# Patient Record
Sex: FEMALE | Race: WHITE | NOT HISPANIC OR LATINO | Employment: UNEMPLOYED | URBAN - METROPOLITAN AREA
[De-identification: names, ages, dates, MRNs, and addresses within clinical notes are randomized per-mention and may not be internally consistent; named-entity substitution may affect disease eponyms.]

---

## 2017-02-02 ENCOUNTER — HOSPITAL ENCOUNTER (EMERGENCY)
Facility: HOSPITAL | Age: 37
Discharge: HOME | End: 2017-02-02
Attending: EMERGENCY MEDICINE | Admitting: EMERGENCY MEDICINE

## 2017-02-02 ENCOUNTER — APPOINTMENT (OUTPATIENT)
Dept: GENERAL RADIOLOGY | Facility: HOSPITAL | Age: 37
End: 2017-02-02

## 2017-02-02 VITALS
WEIGHT: 153.19 LBS | DIASTOLIC BLOOD PRESSURE: 67 MMHG | BODY MASS INDEX: 24.62 KG/M2 | RESPIRATION RATE: 18 BRPM | HEIGHT: 66 IN | HEART RATE: 60 BPM | TEMPERATURE: 97.6 F | SYSTOLIC BLOOD PRESSURE: 110 MMHG | OXYGEN SATURATION: 98 %

## 2017-02-02 DIAGNOSIS — K85.90 ACUTE PANCREATITIS, UNSPECIFIED COMPLICATION STATUS, UNSPECIFIED PANCREATITIS TYPE: Primary | ICD-10-CM

## 2017-02-02 LAB
ALBUMIN SERPL-MCNC: 4.4 G/DL (ref 3.5–5.2)
ALBUMIN/GLOB SERPL: 1.8 G/DL
ALP SERPL-CCNC: 71 U/L (ref 40–129)
ALT SERPL W P-5'-P-CCNC: 16 U/L (ref 5–33)
AMYLASE SERPL-CCNC: 104 U/L (ref 28–100)
ANION GAP SERPL CALCULATED.3IONS-SCNC: 12.9 MMOL/L
APTT PPP: 31.4 SECONDS (ref 24.3–38.1)
AST SERPL-CCNC: 35 U/L (ref 5–32)
BACTERIA UR QL AUTO: ABNORMAL /HPF
BASOPHILS # BLD AUTO: 0.03 10*3/MM3 (ref 0–0.2)
BASOPHILS NFR BLD AUTO: 0.3 % (ref 0–2)
BILIRUB SERPL-MCNC: 0.6 MG/DL (ref 0.2–1.2)
BILIRUB UR QL STRIP: ABNORMAL
BUN BLD-MCNC: 9 MG/DL (ref 6–20)
BUN/CREAT SERPL: 10.8 (ref 7–25)
CALCIUM SPEC-SCNC: 9.4 MG/DL (ref 8.6–10.5)
CHLORIDE SERPL-SCNC: 103 MMOL/L (ref 98–107)
CLARITY UR: CLEAR
CO2 SERPL-SCNC: 25.1 MMOL/L (ref 22–29)
COLOR UR: ABNORMAL
CREAT BLD-MCNC: 0.83 MG/DL (ref 0.57–1)
DEPRECATED RDW RBC AUTO: 45.7 FL (ref 37–54)
EOSINOPHIL # BLD AUTO: 0.04 10*3/MM3 (ref 0.1–0.3)
EOSINOPHIL NFR BLD AUTO: 0.4 % (ref 0–4)
ERYTHROCYTE [DISTWIDTH] IN BLOOD BY AUTOMATED COUNT: 14.3 % (ref 11.5–14.5)
GFR SERPL CREATININE-BSD FRML MDRD: 77 ML/MIN/1.73
GLOBULIN UR ELPH-MCNC: 2.5 GM/DL
GLUCOSE BLD-MCNC: 103 MG/DL (ref 65–99)
GLUCOSE UR STRIP-MCNC: NEGATIVE MG/DL
HCG SERPL QL: NEGATIVE
HCT VFR BLD AUTO: 49 % (ref 37–47)
HGB BLD-MCNC: 16.4 G/DL (ref 12–16)
HGB UR QL STRIP.AUTO: NEGATIVE
HYALINE CASTS UR QL AUTO: ABNORMAL /LPF
IMM GRANULOCYTES # BLD: 0.04 10*3/MM3 (ref 0–0.03)
IMM GRANULOCYTES NFR BLD: 0.4 % (ref 0–0.5)
INR PPP: 0.95 (ref 0.9–1.1)
KETONES UR QL STRIP: ABNORMAL
LEUKOCYTE ESTERASE UR QL STRIP.AUTO: NEGATIVE
LIPASE SERPL-CCNC: 220 U/L (ref 13–60)
LYMPHOCYTES # BLD AUTO: 1.17 10*3/MM3 (ref 0.6–4.8)
LYMPHOCYTES NFR BLD AUTO: 11 % (ref 20–45)
MCH RBC QN AUTO: 29.5 PG (ref 27–31)
MCHC RBC AUTO-ENTMCNC: 33.5 G/DL (ref 31–37)
MCV RBC AUTO: 88.1 FL (ref 81–99)
MONOCYTES # BLD AUTO: 0.3 10*3/MM3 (ref 0–1)
MONOCYTES NFR BLD AUTO: 2.8 % (ref 3–8)
NEUTROPHILS # BLD AUTO: 9.09 10*3/MM3 (ref 1.5–8.3)
NEUTROPHILS NFR BLD AUTO: 85.1 % (ref 45–70)
NITRITE UR QL STRIP: NEGATIVE
NRBC BLD MANUAL-RTO: 0 /100 WBC (ref 0–0)
PH UR STRIP.AUTO: 6 [PH] (ref 4.5–8)
PLATELET # BLD AUTO: 246 10*3/MM3 (ref 140–500)
PMV BLD AUTO: 10.5 FL (ref 7.4–10.4)
POTASSIUM BLD-SCNC: 3.8 MMOL/L (ref 3.5–5.2)
PROT SERPL-MCNC: 6.9 G/DL (ref 6–8.5)
PROT UR QL STRIP: ABNORMAL
PROTHROMBIN TIME: 12.7 SECONDS (ref 12.1–15)
RBC # BLD AUTO: 5.56 10*6/MM3 (ref 4.2–5.4)
RBC # UR: ABNORMAL /HPF
REF LAB TEST METHOD: ABNORMAL
SODIUM BLD-SCNC: 141 MMOL/L (ref 136–145)
SP GR UR STRIP: 1.02 (ref 1–1.03)
SQUAMOUS #/AREA URNS HPF: ABNORMAL /HPF
TROPONIN T SERPL-MCNC: <0.01 NG/ML (ref 0–0.03)
UROBILINOGEN UR QL STRIP: ABNORMAL
WBC NRBC COR # BLD: 10.67 10*3/MM3 (ref 4.8–10.8)
WBC UR QL AUTO: ABNORMAL /HPF

## 2017-02-02 PROCEDURE — 80053 COMPREHEN METABOLIC PANEL: CPT | Performed by: EMERGENCY MEDICINE

## 2017-02-02 PROCEDURE — 85730 THROMBOPLASTIN TIME PARTIAL: CPT | Performed by: EMERGENCY MEDICINE

## 2017-02-02 PROCEDURE — 81001 URINALYSIS AUTO W/SCOPE: CPT | Performed by: EMERGENCY MEDICINE

## 2017-02-02 PROCEDURE — 93005 ELECTROCARDIOGRAM TRACING: CPT | Performed by: EMERGENCY MEDICINE

## 2017-02-02 PROCEDURE — 99284 EMERGENCY DEPT VISIT MOD MDM: CPT | Performed by: EMERGENCY MEDICINE

## 2017-02-02 PROCEDURE — 84484 ASSAY OF TROPONIN QUANT: CPT | Performed by: EMERGENCY MEDICINE

## 2017-02-02 PROCEDURE — 85610 PROTHROMBIN TIME: CPT | Performed by: EMERGENCY MEDICINE

## 2017-02-02 PROCEDURE — 84703 CHORIONIC GONADOTROPIN ASSAY: CPT | Performed by: EMERGENCY MEDICINE

## 2017-02-02 PROCEDURE — 82150 ASSAY OF AMYLASE: CPT | Performed by: EMERGENCY MEDICINE

## 2017-02-02 PROCEDURE — 83690 ASSAY OF LIPASE: CPT | Performed by: EMERGENCY MEDICINE

## 2017-02-02 PROCEDURE — 74022 RADEX COMPL AQT ABD SERIES: CPT

## 2017-02-02 PROCEDURE — 85025 COMPLETE CBC W/AUTO DIFF WBC: CPT | Performed by: EMERGENCY MEDICINE

## 2017-02-02 PROCEDURE — 96374 THER/PROPH/DIAG INJ IV PUSH: CPT

## 2017-02-02 PROCEDURE — 93010 ELECTROCARDIOGRAM REPORT: CPT | Performed by: INTERNAL MEDICINE

## 2017-02-02 PROCEDURE — 99284 EMERGENCY DEPT VISIT MOD MDM: CPT

## 2017-02-02 PROCEDURE — 99283 EMERGENCY DEPT VISIT LOW MDM: CPT

## 2017-02-02 RX ORDER — MAGNESIUM HYDROXIDE/ALUMINUM HYDROXICE/SIMETHICONE 120; 1200; 1200 MG/30ML; MG/30ML; MG/30ML
30 SUSPENSION ORAL ONCE
Status: COMPLETED | OUTPATIENT
Start: 2017-02-02 | End: 2017-02-02

## 2017-02-02 RX ORDER — PROMETHAZINE HYDROCHLORIDE 25 MG/1
25 TABLET ORAL EVERY 6 HOURS PRN
Qty: 12 TABLET | Refills: 0 | Status: SHIPPED | OUTPATIENT
Start: 2017-02-02 | End: 2019-07-31

## 2017-02-02 RX ORDER — SODIUM CHLORIDE 0.9 % (FLUSH) 0.9 %
10 SYRINGE (ML) INJECTION AS NEEDED
Status: DISCONTINUED | OUTPATIENT
Start: 2017-02-02 | End: 2017-02-02 | Stop reason: HOSPADM

## 2017-02-02 RX ORDER — IBUPROFEN 800 MG/1
800 TABLET ORAL EVERY 6 HOURS PRN
COMMUNITY
End: 2019-07-31

## 2017-02-02 RX ORDER — HYDROCODONE BITARTRATE AND ACETAMINOPHEN 5; 325 MG/1; MG/1
1 TABLET ORAL EVERY 4 HOURS PRN
Qty: 15 TABLET | Refills: 0 | Status: SHIPPED | OUTPATIENT
Start: 2017-02-02 | End: 2018-10-25 | Stop reason: HOSPADM

## 2017-02-02 RX ORDER — GLYCOPYRROLATE 0.2 MG/ML
0.2 INJECTION INTRAMUSCULAR; INTRAVENOUS ONCE
Status: COMPLETED | OUTPATIENT
Start: 2017-02-02 | End: 2017-02-02

## 2017-02-02 RX ADMIN — GLYCOPYRROLATE 0.2 MG: 0.2 INJECTION INTRAMUSCULAR; INTRAVENOUS at 11:29

## 2017-02-02 RX ADMIN — ALUMINUM HYDROXIDE, MAGNESIUM HYDROXIDE, AND SIMETHICONE 30 ML: 200; 200; 20 SUSPENSION ORAL at 11:28

## 2017-02-02 RX ADMIN — LIDOCAINE HYDROCHLORIDE: 20 SOLUTION ORAL; TOPICAL at 11:28

## 2017-02-02 NOTE — ED PROVIDER NOTES
"Subjective   History of Present Illness  History of Present Illness    Chief complaint: Epigastric and substernal chest pain    Location: Epigastrium and lower substernal area    Quality/Severity:  Severe and described as burning at first and now \"just pain\"    Timing/Duration: Symptoms began at approximately 0830 hrs. sudden onset    Modifying Factors: History of abdominal adhesions    Associated Symptoms: Mild diaphoresis    Narrative: The patient is a 36-year-old white female who presents as noted above.  The patient relates that she felt okay when she got up this morning and while sitting and drinking a Dr. Pepper she had sudden onset of symptoms.  Patient did arrive via EMS    Review of Systems   Constitutional: Negative for activity change, appetite change, fatigue and fever.   HENT: Negative for congestion.    Respiratory: Negative for cough, shortness of breath and wheezing.    Cardiovascular: Positive for chest pain. Negative for palpitations and leg swelling.   Gastrointestinal: Positive for abdominal pain. Negative for constipation, diarrhea, nausea and vomiting.   Endocrine: Negative for polydipsia.   Genitourinary: Negative for difficulty urinating, dysuria, flank pain, frequency and urgency.   Musculoskeletal: Negative for back pain.   Skin: Negative for rash.   Neurological: Negative for dizziness, weakness and headaches.   Psychiatric/Behavioral: Negative for confusion.       Past Medical History   Diagnosis Date   • COPD (chronic obstructive pulmonary disease)        Allergies   Allergen Reactions   • Aspirin Anaphylaxis   • Bactrim [Sulfamethoxazole-Trimethoprim] Anaphylaxis       Past Surgical History   Procedure Laterality Date   •  section     • Tubal abdominal ligation     • Appendectomy     • Cholecystectomy         Family history is strongly positive for coronary artery disease in multiple first-degree family members.    Social History     Social History   • Marital status: Single     " Spouse name: N/A   • Number of children: N/A   • Years of education: N/A     Social History Main Topics   • Smoking status: Current Every Day Smoker     Packs/day: 0.50     Types: Cigarettes   • Smokeless tobacco: None   • Alcohol use No   • Drug use: No   • Sexual activity: Defer     Other Topics Concern   • None     Social History Narrative   • None           Objective   Physical Exam   Constitutional: She is oriented to person, place, and time. She appears well-developed and well-nourished.   Patient is noted to be kneeling on the bed in a fetal-like position and is reluctant to move out of this position.   HENT:   Head: Normocephalic and atraumatic.   Advanced dental disease noted.   Eyes: Conjunctivae and EOM are normal.   Neck: Normal range of motion. Neck supple. No thyromegaly present.   Cardiovascular: Normal rate, regular rhythm and normal heart sounds.    No murmur heard.  Pulmonary/Chest: Effort normal and breath sounds normal. No respiratory distress. She has no wheezes. She has no rales.   Abdominal: There is tenderness (upper abdomen). There is guarding.   Bowel sounds noted to be very active   Musculoskeletal: Normal range of motion. She exhibits no edema or tenderness.   Lymphadenopathy:     She has no cervical adenopathy.   Neurological: She is alert and oriented to person, place, and time.   Skin: Skin is warm and dry. No rash noted.   Psychiatric: She has a normal mood and affect. Her behavior is normal. Judgment and thought content normal.   Nursing note and vitals reviewed.      Procedures    Final diagnoses:   Acute pancreatitis, unspecified complication status, unspecified pancreatitis type            ED Course  ED Course   Comment By Time   EKG was reviewed at 1123 hours and showed a normal sinus rhythm with a rate of 66 bpm.  Poor R-wave progression.  ST segments and T waves were unremarkable.  No ectopy. Tenzin Stringer MD 02/02 1125   02/02/17, 2:04 PM  Final radiology report on the  abdominal series noted.  Findings discussed with patient.  Expectations, care plan, follow-up and warnings discussed with patient. Tenzin Stringer MD 02/02 1412                  MDM  Number of Diagnoses or Management Options  Acute pancreatitis, unspecified complication status, unspecified pancreatitis type:      Amount and/or Complexity of Data Reviewed  Clinical lab tests: ordered and reviewed  Tests in the radiology section of CPT®: ordered and reviewed  Independent visualization of images, tracings, or specimens: yes    Risk of Complications, Morbidity, and/or Mortality  Presenting problems: high  Diagnostic procedures: high  Management options: moderate    My differential diagnosis for abdominal pain includes but is not limited to:  Gastritis, gastroenteritis, peptic ulcer disease, GERD, esophageal perforation, acute appendicitis, mesenteric adenitis, Meckel’s diverticulum, epiploic appendagitis, diverticulitis, colon cancer, ulcerative colitis, Crohn’s disease, intussusception, small bowel obstruction, adhesions, ischemic bowel, perforated viscus, ileus, obstipation, biliary colic, cholecystitis, cholelithiasis, Aptel-Tanmay Joao, hepatitis, pancreatitis, common bile duct obstruction, cholangitis, bile leak, splenic trauma, splenic rupture, splenic infarction, splenic abscess, abdominal abscess, ascites, spontaneous bacterial peritonitis, hernia, UTI, cystitis, prostatitis, ureterolithiasis, urinary obstruction, ovarian cyst, torsion, pregnancy, ectopic pregnancy, PID, pelvic abscess, mittelschmerz, endometriosis, AAA, myocardial infarction, pneumonia, cancer, porphyria, DKA, medications, sickle cell, viral syndrome, zoster    Labs this visit  Lab Results (last 24 hours)     Procedure Component Value Units Date/Time    Comprehensive Metabolic Panel [[id number]]  (Abnormal) Collected:  02/02/17 1038    Specimen:  Blood from Arm, Right Updated:  02/02/17 1109     Glucose 103 (H) mg/dL      BUN 9 mg/dL       Creatinine 0.83 mg/dL      Sodium 141 mmol/L      Potassium 3.8 mmol/L      Chloride 103 mmol/L      CO2 25.1 mmol/L      Calcium 9.4 mg/dL      Total Protein 6.9 g/dL      Albumin 4.40 g/dL      ALT (SGPT) 16 U/L      AST (SGOT) 35 (H) U/L      Alkaline Phosphatase 71 U/L      Total Bilirubin 0.6 mg/dL      eGFR Non African Amer 77 mL/min/1.73      Globulin 2.5 gm/dL      A/G Ratio 1.8 g/dL      BUN/Creatinine Ratio 10.8      Anion Gap 12.9 mmol/L     Lipase [[id number]]  (Abnormal) Collected:  02/02/17 1038    Specimen:  Blood from Arm, Right Updated:  02/02/17 1109     Lipase 220 (H) U/L     Amylase [[id number]]  (Abnormal) Collected:  02/02/17 1038    Specimen:  Blood from Arm, Right Updated:  02/02/17 1109     Amylase 104 (H) U/L     CBC & Differential [[id number]] Collected:  02/02/17 1039    Specimen:  Blood Updated:  02/02/17 1044    Narrative:       The following orders were created for panel order CBC & Differential.  Procedure                               Abnormality         Status                     ---------                               -----------         ------                     CBC Auto Differential[[id number]]         Abnormal            Final result                 Please view results for these tests on the individual orders.    CBC Auto Differential [[id number]]  (Abnormal) Collected:  02/02/17 1039    Specimen:  Blood from Arm, Right Updated:  02/02/17 1044     WBC 10.67 10*3/mm3      RBC 5.56 (H) 10*6/mm3      Hemoglobin 16.4 (H) g/dL      Hematocrit 49.0 (H) %      MCV 88.1 fL      MCH 29.5 pg      MCHC 33.5 g/dL      RDW 14.3 %      RDW-SD 45.7 fl      MPV 10.5 (H) fL      Platelets 246 10*3/mm3      Neutrophil % 85.1 (H) %      Lymphocyte % 11.0 (L) %      Monocyte % 2.8 (L) %      Eosinophil % 0.4 %      Basophil % 0.3 %      Immature Grans % 0.4 %      Neutrophils, Absolute 9.09 (H) 10*3/mm3      Lymphocytes, Absolute 1.17 10*3/mm3      Monocytes, Absolute 0.30 10*3/mm3      Eosinophils,  Absolute 0.04 (L) 10*3/mm3      Basophils, Absolute 0.03 10*3/mm3      Immature Grans, Absolute 0.04 (H) 10*3/mm3      nRBC 0.0 /100 WBC     Protime-INR [[id number]]  (Normal) Collected:  02/02/17 1116    Specimen:  Blood Updated:  02/02/17 1124     Protime 12.7 Seconds      INR 0.95     Narrative:       Therapeutic Ranges for INR: 2.0-3.0 (PT 20-30)                              2.5-3.5 (PT 25-34)    aPTT [[id number]]  (Normal) Collected:  02/02/17 1116    Specimen:  Blood Updated:  02/02/17 1124     PTT 31.4 seconds     Narrative:       PTT = The equivalent PTT values for the therapeutic range of heparin levels at 0.1 to 0.7 U/ml are 53 to 110 seconds.    hCG, Serum, Qualitative [[id number]]  (Normal) Collected:  02/02/17 1116    Specimen:  Blood Updated:  02/02/17 1135     HCG Qualitative Negative     Troponin [[id number]]  (Normal) Collected:  02/02/17 1116    Specimen:  Blood Updated:  02/02/17 1134     Troponin T <0.010 ng/mL     Narrative:       Troponin T Reference Ranges:  Less than 0.03 ng/mL:    Negative for AMI  0.03 to 0.09 ng/mL:      Indeterminant for AMI  Greater than 0.09 ng/mL: Positive for AMI    Urinalysis With / Culture If Indicated [[id number]]  (Abnormal) Collected:  02/02/17 1142    Specimen:  Urine from Urine, Clean Catch Updated:  02/02/17 1159     Color, UA Dark Yellow (A)      Appearance, UA Clear      pH, UA 6.0      Specific Gravity, UA 1.023       Result obtained by Refractometer        Glucose, UA Negative      Ketones, UA Trace (A)      Bilirubin, UA Small (1+) (A)      Blood, UA Negative      Protein, UA 30 mg/dL (1+) (A)      Leuk Esterase, UA Negative      Nitrite, UA Negative      Urobilinogen, UA 1.0 E.U./dL     Urinalysis, Microscopic Only [[id number]]  (Abnormal) Collected:  02/02/17 1142    Specimen:  Urine from Urine, Clean Catch Updated:  02/02/17 1200     RBC, UA 0-2 (A) /HPF      WBC, UA 0-2 (A) /HPF      Bacteria, UA Trace (A) /HPF      Squamous Epithelial Cells, UA 3-6 (A) /HPF       Hyaline Casts, UA None Seen /LPF      Methodology Manual Light Microscopy         Prescribed on discharge             Medication List      New Prescriptions          promethazine 25 MG tablet   Commonly known as:  PHENERGAN   Take 1 tablet by mouth Every 6 (Six) Hours As Needed for nausea or   vomiting for up to 12 doses.         Stop          amoxicillin 500 MG capsule   Commonly known as:  AMOXIL       latanoprost 0.005 % ophthalmic solution   Commonly known as:  XALATAN           All lab results, imaging results and other tests were reviewed by Tenzin Stringer MD and unless otherwise specified were found to be unremarkable.      Final diagnoses:   Acute pancreatitis, unspecified complication status, unspecified pancreatitis type            Tenzin Stringer MD  02/02/17 5609

## 2017-02-02 NOTE — DISCHARGE INSTRUCTIONS
Clear liquids only for the next 24 hours.  Use pain medicine as needed.  Return to the emergency department should the pain get worse, persistent vomiting or fever.

## 2018-09-19 ENCOUNTER — TRANSCRIBE ORDERS (OUTPATIENT)
Dept: ADMINISTRATIVE | Facility: HOSPITAL | Age: 38
End: 2018-09-19

## 2018-09-19 ENCOUNTER — HOSPITAL ENCOUNTER (OUTPATIENT)
Dept: ULTRASOUND IMAGING | Facility: HOSPITAL | Age: 38
Discharge: HOME | End: 2018-09-19
Admitting: NURSE PRACTITIONER

## 2018-09-19 DIAGNOSIS — R10.32 LEFT LOWER QUADRANT PAIN: Primary | ICD-10-CM

## 2018-09-19 DIAGNOSIS — R10.32 LEFT LOWER QUADRANT PAIN: ICD-10-CM

## 2018-09-19 PROCEDURE — 76856 US EXAM PELVIC COMPLETE: CPT

## 2018-09-19 PROCEDURE — 76830 TRANSVAGINAL US NON-OB: CPT

## 2018-10-10 ENCOUNTER — OFFICE VISIT (OUTPATIENT)
Dept: OBSTETRICS AND GYNECOLOGY | Facility: CLINIC | Age: 38
End: 2018-10-10

## 2018-10-10 VITALS
WEIGHT: 154 LBS | SYSTOLIC BLOOD PRESSURE: 140 MMHG | HEIGHT: 66 IN | DIASTOLIC BLOOD PRESSURE: 70 MMHG | BODY MASS INDEX: 24.75 KG/M2

## 2018-10-10 DIAGNOSIS — Z90.710 H/O ABDOMINAL HYSTERECTOMY: ICD-10-CM

## 2018-10-10 DIAGNOSIS — F17.200 SMOKER: ICD-10-CM

## 2018-10-10 DIAGNOSIS — R10.32 LLQ PAIN: Primary | ICD-10-CM

## 2018-10-10 DIAGNOSIS — N83.201 BILATERAL OVARIAN CYSTS: ICD-10-CM

## 2018-10-10 DIAGNOSIS — N83.202 BILATERAL OVARIAN CYSTS: ICD-10-CM

## 2018-10-10 LAB
BILIRUB BLD-MCNC: NEGATIVE MG/DL
CLARITY, POC: CLEAR
COLOR UR: YELLOW
GLUCOSE UR STRIP-MCNC: NEGATIVE MG/DL
KETONES UR QL: NEGATIVE
LEUKOCYTE EST, POC: NEGATIVE
NITRITE UR-MCNC: NEGATIVE MG/ML
PH UR: 5 [PH] (ref 5–8)
PROT UR STRIP-MCNC: NEGATIVE MG/DL
RBC # UR STRIP: NEGATIVE /UL
SP GR UR: 1 (ref 1–1.03)
UROBILINOGEN UR QL: NORMAL

## 2018-10-10 PROCEDURE — 99204 OFFICE O/P NEW MOD 45 MIN: CPT | Performed by: OBSTETRICS & GYNECOLOGY

## 2018-10-10 PROCEDURE — 99407 BEHAV CHNG SMOKING > 10 MIN: CPT | Performed by: OBSTETRICS & GYNECOLOGY

## 2018-10-10 RX ORDER — OXYCODONE HYDROCHLORIDE AND ACETAMINOPHEN 5; 325 MG/1; MG/1
1 TABLET ORAL EVERY 4 HOURS PRN
Qty: 30 TABLET | Refills: 0 | Status: SHIPPED | OUTPATIENT
Start: 2018-10-10 | End: 2018-10-20

## 2018-10-10 NOTE — PROGRESS NOTES
Patient Care Team:  Alejandra Grubbs APRN as PCP - General  Alejandra Grubbs APRN as PCP - Family Medicine    Patient new to practice? yes Patient new to examiner? yes     -----------------------------------------------------HISTORY---------------------------------------------------    Chief Complaint:   Chief Complaint   Patient presents with   • Abdominal Pain     ultrasound      New problem to examiner? yes    No LMP recorded (lmp unknown). Patient has had a hysterectomy.      HPI:     Abdominal Pain   This is a new problem. The current episode started 1 to 4 weeks ago. The onset quality is sudden. The problem occurs intermittently. The problem has been gradually worsening. The pain is located in the LLQ. The pain is at a severity of 9/10. The pain is severe. The quality of the pain is sharp. The abdominal pain does not radiate. Associated symptoms include anorexia and nausea. The pain is aggravated by certain positions. The pain is relieved by nothing. Treatments tried: motrin. The treatment provided no relief. Prior diagnostic workup includes CT scan and ultrasound.         Review of Systems   Constitutional: Negative.    HENT: Negative.    Eyes: Negative.    Respiratory: Negative.    Cardiovascular: Negative.    Gastrointestinal: Positive for abdominal pain, anorexia and nausea.   Endocrine: Negative.    Genitourinary: Positive for dyspareunia.   Musculoskeletal: Negative.    Skin: Negative.    Allergic/Immunologic: Negative.    Neurological: Negative.    Hematological: Negative.    Psychiatric/Behavioral: Negative.    :      PFSH: PAST HISTORY REVIEWED     1.    Past Medical History:   Diagnosis Date   • COPD (chronic obstructive pulmonary disease) (CMS/[facility])            Status of: reviewed.      2. No family history on file.    3. Social History: :  Single  Employment/occupation:  yes  Smoker: yes  Alcohol: no Recreational drugs: no     4.   Past Surgical History:   Procedure Laterality Date   •  "APPENDECTOMY     •  SECTION     • CHOLECYSTECTOMY     • TUBAL ABDOMINAL LIGATION          History of classical Csection?  no    5.   Current Outpatient Prescriptions:   •  albuterol (PROVENTIL HFA;VENTOLIN HFA) 108 (90 BASE) MCG/ACT inhaler, Inhale 2 puffs every 4 (four) hours as needed for wheezing., Disp: , Rfl:   •  albuterol (PROVENTIL) (2.5 MG/3ML) 0.083% nebulizer solution, Take 2.5 mg by nebulization every 4 (four) hours as needed for wheezing., Disp: , Rfl:   •  CETIRIZINE HCL PO, Take 10 mg by mouth., Disp: , Rfl:   •  HYDROcodone-acetaminophen (NORCO) 5-325 MG per tablet, Take 1 tablet by mouth Every 4 (Four) Hours As Needed for moderate pain (4-6) for up to 15 doses., Disp: 15 tablet, Rfl: 0  •  ibuprofen (ADVIL,MOTRIN) 800 MG tablet, Take 800 mg by mouth Every 6 (Six) Hours As Needed for mild pain (1-3)., Disp: , Rfl:   •  oxyCODONE-acetaminophen (PERCOCET) 5-325 MG per tablet, Take 1 tablet by mouth Every 4 (Four) Hours As Needed for Severe Pain  for up to 10 days., Disp: 30 tablet, Rfl: 0  •  promethazine (PHENERGAN) 25 MG tablet, Take 1 tablet by mouth Every 6 (Six) Hours As Needed for nausea or vomiting for up to 12 doses., Disp: 12 tablet, Rfl: 0  •  tiotropium (SPIRIVA) 18 MCG per inhalation capsule, Place 1 capsule into inhaler and inhale 1 (one) time daily., Disp: , Rfl:     6.   Allergies   Allergen Reactions   • Aspirin Anaphylaxis   • Bactrim [Sulfamethoxazole-Trimethoprim] Anaphylaxis                     -----------------------------------------------PHYSICAL EXAM----------------------------------------------    Vital Signs: /70   Ht 167.6 cm (65.98\")   Wt 69.9 kg (154 lb)   LMP  (LMP Unknown)   BMI 24.87 kg/m²    Flowsheet Rows      First Filed Value   Admission Height  167.6 cm (65.98\") Documented at 10/10/2018 1049   Admission Weight  69.9 kg (154 lb) Documented at 10/10/2018 1049          Physical Exam   Constitutional: She is oriented to person, place, and time. She " appears well-developed and well-nourished.   HENT:   Head: Normocephalic and atraumatic.   Eyes: EOM are normal.   Neck: Normal range of motion.   Pulmonary/Chest: Effort normal.   Abdominal: Soft. Bowel sounds are normal. She exhibits no distension and no mass. There is tenderness. There is guarding. There is no rebound.   Musculoskeletal: Normal range of motion.   Neurological: She is alert and oriented to person, place, and time.   Skin: Skin is warm and dry.   Psychiatric: She has a normal mood and affect. Her behavior is normal. Judgment and thought content normal.   Nursing note and vitals reviewed.                          -----------------------------------------------MEDICAL DECISION MAKING-----------------------------  Risk counseling done:  Yes    Ready to quit: Not Answered  Counseling given: Not Answered  .  I advised the patient of the risks in continuing to use tobacco, and I provided this patient with smoking cessation educational materials.  I also discussed how to quit smoking and the patient has expressed the willingness to quit.    During this visit, I spent > 10 minutes counseling the patient regarding smoking cessation.         Results Reviewed:     1.   Lab Results (last 24 hours)     Procedure Component Value Units Date/Time    POC Urinalysis Dipstick [[id number]] Collected:  10/10/18 1105    Specimen:  Urine Updated:  10/10/18 1106     Color Yellow     Clarity, UA Clear     Glucose, UA Negative mg/dL      Bilirubin Negative     Ketones, UA Negative     Specific Gravity  1.005     Blood, UA Negative     pH, Urine 5.0     Protein, POC Negative mg/dL      Urobilinogen, UA Normal     Leukocytes Negative     Nitrite, UA Negative        2.   Imaging Results (last 24 hours)     ** No results found for the last 24 hours. **        3.   ECG/EMG Results (most recent)     None          Old records reviewed?  Yes:  U/s reviewed with pt:    FINDINGS: Transabdominal and transvaginal imaging of the pelvis.  Uterus  is surgically absent. Urinary bladder grossly normal. There is a 1.3 cm  right ovarian cyst. There is a complex lesion on the left ovary  measuring 2.9 x 1.7 x 2.2 cm. This may reflect a hemorrhagic cyst.  Follow-up pelvic ultrasound in 6 weeks is recommended for further  evaluation. A true ovarian mass is not excluded. Both ovaries show  perfusion by Doppler. There is trace free fluid in the pelvis of  doubtful clinical significance.     IMPRESSION:  1. Complex lesion on the left ovary could reflect a hemorrhagic cyst.  Follow-up pelvic ultrasound in 6 weeks is recommended for further  evaluation.  2. Simple right ovarian cyst.  3. Hysterectomy.      Diagnoses and/or chronic conditions reviewed with pt:  Alexandra was seen today for abdominal pain.    Diagnoses and all orders for this visit:    LLQ pain  -     Case Request; Standing  -     CBC and Differential; Future  -     Case Request    H/O abdominal hysterectomy-for chronic DUB  -     POC Urinalysis Dipstick  -     Case Request; Standing  -     CBC and Differential; Future  -     Case Request    Smoker    Bilateral ovarian cysts  -     Case Request; Standing  -     CBC and Differential; Future  -     Case Request    Other orders  -     Follow Anesthesia Guidelines / Standing Orders; Future  -     Follow Anesthesia Guidelines / Standing Orders; Standing  -     Obtain informed consent; Standing  -     Place sequential compression device; Standing  -     oxyCODONE-acetaminophen (PERCOCET) 5-325 MG per tablet; Take 1 tablet by mouth Every 4 (Four) Hours As Needed for Severe Pain  for up to 10 days.        IMP:  Severe LLQ pain.  Hx hysterectomy.  Bilateral small ovarian cysts.    PLAN: erx percocets.    Dx laparoscopy.      RISKS, ALTERNATIVES, COMPLICATIONS OF THE PROCEDURE INCLUDING BUT NOT LIMITED TO:    INTRAOPERATIVE RISKS: INJURY TO INTERNAL ORGANS (BOWEL, BLADDER, URETER,BLOOD VESSELS) OR HEMORRHAGE REQUIRING FURTHER SURGERY, INFECTION, AND DEATH;    POSTOP COMPLICATIONS: BLEEDING, INFECTION, PNEUMONIA, PULMONARY EMBOLISM, AND DEATH;   WERE EXPLAINED TO THE PT WHO VERBALIZED HER UNDERSTANDING.        Labs/imaging ordered: none    RTO Return if symptoms worsen or fail to improve.    Time: More than 50% of time spent in counseling and/or coordination of care:  Total face-to-face/floor time 45 min.  Time spent in counseling 40 min. Counseling included the following topics with prognosis, differential diagnosis, risks, benefits of treatment, instructions, complicance and/or risk reduction and alternatives: surgical v. Medical management of pelvic pain.  Risks of narcotics.        Joe Hastings MD  11:15 AM  10/10/18

## 2018-10-15 ENCOUNTER — RESULTS ENCOUNTER (OUTPATIENT)
Dept: OBSTETRICS AND GYNECOLOGY | Facility: CLINIC | Age: 38
End: 2018-10-15

## 2018-10-15 DIAGNOSIS — N83.201 BILATERAL OVARIAN CYSTS: ICD-10-CM

## 2018-10-15 DIAGNOSIS — Z90.710 H/O ABDOMINAL HYSTERECTOMY: ICD-10-CM

## 2018-10-15 DIAGNOSIS — R10.32 LLQ PAIN: ICD-10-CM

## 2018-10-15 DIAGNOSIS — N83.202 BILATERAL OVARIAN CYSTS: ICD-10-CM

## 2018-10-15 RX ORDER — LORATADINE 10 MG/1
10 CAPSULE, LIQUID FILLED ORAL DAILY
COMMUNITY
End: 2018-11-07

## 2018-10-15 RX ORDER — CELECOXIB 100 MG/1
100 CAPSULE ORAL DAILY
COMMUNITY
End: 2019-07-31

## 2018-10-22 ENCOUNTER — ANESTHESIA EVENT (OUTPATIENT)
Dept: PERIOP | Facility: HOSPITAL | Age: 38
End: 2018-10-22

## 2018-10-24 PROCEDURE — S0260 H&P FOR SURGERY: HCPCS | Performed by: OBSTETRICS & GYNECOLOGY

## 2018-10-25 ENCOUNTER — HOSPITAL ENCOUNTER (OUTPATIENT)
Facility: HOSPITAL | Age: 38
Setting detail: HOSPITAL OUTPATIENT SURGERY
Discharge: HOME | End: 2018-10-25
Attending: OBSTETRICS & GYNECOLOGY | Admitting: OBSTETRICS & GYNECOLOGY

## 2018-10-25 ENCOUNTER — ANESTHESIA (OUTPATIENT)
Dept: PERIOP | Facility: HOSPITAL | Age: 38
End: 2018-10-25

## 2018-10-25 VITALS
BODY MASS INDEX: 23.92 KG/M2 | TEMPERATURE: 97.8 F | DIASTOLIC BLOOD PRESSURE: 69 MMHG | HEIGHT: 67 IN | RESPIRATION RATE: 14 BRPM | SYSTOLIC BLOOD PRESSURE: 115 MMHG | OXYGEN SATURATION: 98 % | WEIGHT: 152.4 LBS | HEART RATE: 56 BPM

## 2018-10-25 LAB
BASOPHILS # BLD AUTO: 0.07 10*3/MM3 (ref 0–0.2)
BASOPHILS NFR BLD AUTO: 1.1 % (ref 0–2)
DEPRECATED RDW RBC AUTO: 45.4 FL (ref 37–54)
EOSINOPHIL # BLD AUTO: 0.12 10*3/MM3 (ref 0.1–0.3)
EOSINOPHIL NFR BLD AUTO: 2 % (ref 0–4)
ERYTHROCYTE [DISTWIDTH] IN BLOOD BY AUTOMATED COUNT: 14 % (ref 11.5–14.5)
HCT VFR BLD AUTO: 44.6 % (ref 37–47)
HGB BLD-MCNC: 14.9 G/DL (ref 12–16)
IMM GRANULOCYTES # BLD: 0.01 10*3/MM3 (ref 0–0.03)
IMM GRANULOCYTES NFR BLD: 0.2 % (ref 0–0.5)
LYMPHOCYTES # BLD AUTO: 1.88 10*3/MM3 (ref 0.6–4.8)
LYMPHOCYTES NFR BLD AUTO: 30.7 % (ref 20–45)
MCH RBC QN AUTO: 29.1 PG (ref 27–31)
MCHC RBC AUTO-ENTMCNC: 33.4 G/DL (ref 31–37)
MCV RBC AUTO: 87.1 FL (ref 81–99)
MONOCYTES # BLD AUTO: 0.63 10*3/MM3 (ref 0–1)
MONOCYTES NFR BLD AUTO: 10.3 % (ref 3–8)
NEUTROPHILS # BLD AUTO: 3.42 10*3/MM3 (ref 1.5–8.3)
NEUTROPHILS NFR BLD AUTO: 55.7 % (ref 45–70)
NRBC BLD MANUAL-RTO: 0 /100 WBC (ref 0–0)
PLATELET # BLD AUTO: 232 10*3/MM3 (ref 140–500)
PMV BLD AUTO: 10.2 FL (ref 7.4–10.4)
RBC # BLD AUTO: 5.12 10*6/MM3 (ref 4.2–5.4)
WBC NRBC COR # BLD: 6.13 10*3/MM3 (ref 4.8–10.8)

## 2018-10-25 PROCEDURE — 25010000002 METOCLOPRAMIDE PER 10 MG: Performed by: NURSE ANESTHETIST, CERTIFIED REGISTERED

## 2018-10-25 PROCEDURE — 36415 COLL VENOUS BLD VENIPUNCTURE: CPT | Performed by: OBSTETRICS & GYNECOLOGY

## 2018-10-25 PROCEDURE — 49329 UNLSTD LAPS PX ABD PERTM&OMN: CPT | Performed by: OBSTETRICS & GYNECOLOGY

## 2018-10-25 PROCEDURE — 25010000002 PROPOFOL 10 MG/ML EMULSION: Performed by: NURSE ANESTHETIST, CERTIFIED REGISTERED

## 2018-10-25 PROCEDURE — 85025 COMPLETE CBC W/AUTO DIFF WBC: CPT | Performed by: OBSTETRICS & GYNECOLOGY

## 2018-10-25 PROCEDURE — 25010000002 SUCCINYLCHOLINE PER 20 MG: Performed by: NURSE ANESTHETIST, CERTIFIED REGISTERED

## 2018-10-25 PROCEDURE — 25010000002 ONDANSETRON PER 1 MG: Performed by: NURSE ANESTHETIST, CERTIFIED REGISTERED

## 2018-10-25 PROCEDURE — 25010000002 FENTANYL CITRATE (PF) 100 MCG/2ML SOLUTION: Performed by: NURSE ANESTHETIST, CERTIFIED REGISTERED

## 2018-10-25 PROCEDURE — 25010000002 NEOSTIGMINE PER 0.5 MG: Performed by: NURSE ANESTHETIST, CERTIFIED REGISTERED

## 2018-10-25 PROCEDURE — 44180 LAP ENTEROLYSIS: CPT | Performed by: OBSTETRICS & GYNECOLOGY

## 2018-10-25 PROCEDURE — 25010000002 MIDAZOLAM PER 1 MG: Performed by: NURSE ANESTHETIST, CERTIFIED REGISTERED

## 2018-10-25 PROCEDURE — 25010000002 DEXAMETHASONE PER 1 MG: Performed by: NURSE ANESTHETIST, CERTIFIED REGISTERED

## 2018-10-25 RX ORDER — ROCURONIUM BROMIDE 10 MG/ML
INJECTION, SOLUTION INTRAVENOUS AS NEEDED
Status: DISCONTINUED | OUTPATIENT
Start: 2018-10-25 | End: 2018-10-25 | Stop reason: SURG

## 2018-10-25 RX ORDER — SODIUM CHLORIDE 0.9 % (FLUSH) 0.9 %
3 SYRINGE (ML) INJECTION EVERY 12 HOURS SCHEDULED
Status: DISCONTINUED | OUTPATIENT
Start: 2018-10-25 | End: 2018-10-25 | Stop reason: HOSPADM

## 2018-10-25 RX ORDER — SODIUM CHLORIDE, SODIUM LACTATE, POTASSIUM CHLORIDE, CALCIUM CHLORIDE 600; 310; 30; 20 MG/100ML; MG/100ML; MG/100ML; MG/100ML
100 INJECTION, SOLUTION INTRAVENOUS CONTINUOUS
Status: DISCONTINUED | OUTPATIENT
Start: 2018-10-25 | End: 2018-10-25 | Stop reason: HOSPADM

## 2018-10-25 RX ORDER — MIDAZOLAM HYDROCHLORIDE 1 MG/ML
1 INJECTION INTRAMUSCULAR; INTRAVENOUS
Status: DISCONTINUED | OUTPATIENT
Start: 2018-10-25 | End: 2018-10-25 | Stop reason: HOSPADM

## 2018-10-25 RX ORDER — FAMOTIDINE 20 MG/1
20 TABLET, FILM COATED ORAL EVERY 12 HOURS SCHEDULED
Status: DISCONTINUED | OUTPATIENT
Start: 2018-10-25 | End: 2018-10-25 | Stop reason: HOSPADM

## 2018-10-25 RX ORDER — DEXAMETHASONE SODIUM PHOSPHATE 4 MG/ML
8 INJECTION, SOLUTION INTRA-ARTICULAR; INTRALESIONAL; INTRAMUSCULAR; INTRAVENOUS; SOFT TISSUE ONCE AS NEEDED
Status: COMPLETED | OUTPATIENT
Start: 2018-10-25 | End: 2018-10-25

## 2018-10-25 RX ORDER — OXYCODONE HYDROCHLORIDE AND ACETAMINOPHEN 5; 325 MG/1; MG/1
2 TABLET ORAL EVERY 4 HOURS PRN
Qty: 30 TABLET | Refills: 0 | Status: SHIPPED | OUTPATIENT
Start: 2018-10-25 | End: 2018-11-04

## 2018-10-25 RX ORDER — SODIUM CHLORIDE, SODIUM LACTATE, POTASSIUM CHLORIDE, CALCIUM CHLORIDE 600; 310; 30; 20 MG/100ML; MG/100ML; MG/100ML; MG/100ML
9 INJECTION, SOLUTION INTRAVENOUS CONTINUOUS
Status: DISCONTINUED | OUTPATIENT
Start: 2018-10-25 | End: 2018-10-25 | Stop reason: HOSPADM

## 2018-10-25 RX ORDER — SODIUM CHLORIDE 0.9 % (FLUSH) 0.9 %
1-10 SYRINGE (ML) INJECTION AS NEEDED
Status: DISCONTINUED | OUTPATIENT
Start: 2018-10-25 | End: 2018-10-25 | Stop reason: HOSPADM

## 2018-10-25 RX ORDER — LIDOCAINE HYDROCHLORIDE 10 MG/ML
0.5 INJECTION, SOLUTION EPIDURAL; INFILTRATION; INTRACAUDAL; PERINEURAL ONCE AS NEEDED
Status: DISCONTINUED | OUTPATIENT
Start: 2018-10-25 | End: 2018-10-25 | Stop reason: HOSPADM

## 2018-10-25 RX ORDER — GLYCOPYRROLATE 0.2 MG/ML
INJECTION INTRAMUSCULAR; INTRAVENOUS AS NEEDED
Status: DISCONTINUED | OUTPATIENT
Start: 2018-10-25 | End: 2018-10-25 | Stop reason: SURG

## 2018-10-25 RX ORDER — SUCCINYLCHOLINE CHLORIDE 20 MG/ML
INJECTION INTRAMUSCULAR; INTRAVENOUS AS NEEDED
Status: DISCONTINUED | OUTPATIENT
Start: 2018-10-25 | End: 2018-10-25 | Stop reason: SURG

## 2018-10-25 RX ORDER — OXYCODONE HYDROCHLORIDE AND ACETAMINOPHEN 5; 325 MG/1; MG/1
1 TABLET ORAL ONCE AS NEEDED
Status: COMPLETED | OUTPATIENT
Start: 2018-10-25 | End: 2018-10-25

## 2018-10-25 RX ORDER — LIDOCAINE HYDROCHLORIDE 20 MG/ML
INJECTION, SOLUTION INFILTRATION; PERINEURAL AS NEEDED
Status: DISCONTINUED | OUTPATIENT
Start: 2018-10-25 | End: 2018-10-25 | Stop reason: SURG

## 2018-10-25 RX ORDER — IBUPROFEN 800 MG/1
800 TABLET ORAL EVERY 8 HOURS PRN
Qty: 30 TABLET | Refills: 0 | Status: SHIPPED | OUTPATIENT
Start: 2018-10-25 | End: 2019-07-31 | Stop reason: SDUPTHER

## 2018-10-25 RX ORDER — MAGNESIUM HYDROXIDE 1200 MG/15ML
LIQUID ORAL AS NEEDED
Status: DISCONTINUED | OUTPATIENT
Start: 2018-10-25 | End: 2018-10-25 | Stop reason: HOSPADM

## 2018-10-25 RX ORDER — GLYCOPYRROLATE 0.2 MG/ML
0.2 INJECTION INTRAMUSCULAR; INTRAVENOUS
Status: DISCONTINUED | OUTPATIENT
Start: 2018-10-25 | End: 2018-10-25 | Stop reason: HOSPADM

## 2018-10-25 RX ORDER — SODIUM CHLORIDE 9 MG/ML
40 INJECTION, SOLUTION INTRAVENOUS AS NEEDED
Status: DISCONTINUED | OUTPATIENT
Start: 2018-10-25 | End: 2018-10-25 | Stop reason: HOSPADM

## 2018-10-25 RX ORDER — ONDANSETRON 2 MG/ML
4 INJECTION INTRAMUSCULAR; INTRAVENOUS ONCE AS NEEDED
Status: DISCONTINUED | OUTPATIENT
Start: 2018-10-25 | End: 2018-10-25 | Stop reason: HOSPADM

## 2018-10-25 RX ORDER — MIDAZOLAM HYDROCHLORIDE 1 MG/ML
2 INJECTION INTRAMUSCULAR; INTRAVENOUS
Status: DISCONTINUED | OUTPATIENT
Start: 2018-10-25 | End: 2018-10-25 | Stop reason: HOSPADM

## 2018-10-25 RX ORDER — ONDANSETRON 2 MG/ML
4 INJECTION INTRAMUSCULAR; INTRAVENOUS ONCE AS NEEDED
Status: COMPLETED | OUTPATIENT
Start: 2018-10-25 | End: 2018-10-25

## 2018-10-25 RX ORDER — PROPOFOL 10 MG/ML
VIAL (ML) INTRAVENOUS AS NEEDED
Status: DISCONTINUED | OUTPATIENT
Start: 2018-10-25 | End: 2018-10-25 | Stop reason: SURG

## 2018-10-25 RX ORDER — METOCLOPRAMIDE HYDROCHLORIDE 5 MG/ML
10 INJECTION INTRAMUSCULAR; INTRAVENOUS ONCE AS NEEDED
Status: COMPLETED | OUTPATIENT
Start: 2018-10-25 | End: 2018-10-25

## 2018-10-25 RX ORDER — FENTANYL CITRATE 50 UG/ML
INJECTION, SOLUTION INTRAMUSCULAR; INTRAVENOUS AS NEEDED
Status: DISCONTINUED | OUTPATIENT
Start: 2018-10-25 | End: 2018-10-25 | Stop reason: SURG

## 2018-10-25 RX ADMIN — ROCURONIUM BROMIDE 10 MG: 10 INJECTION INTRAVENOUS at 09:47

## 2018-10-25 RX ADMIN — GLYCOPYRROLATE 0.2 MG: 0.2 INJECTION INTRAMUSCULAR; INTRAVENOUS at 08:39

## 2018-10-25 RX ADMIN — NEOSTIGMINE METHYLSULFATE 4 MG: 1 INJECTION, SOLUTION INTRAMUSCULAR; INTRAVENOUS; SUBCUTANEOUS at 10:22

## 2018-10-25 RX ADMIN — DEXAMETHASONE SODIUM PHOSPHATE 8 MG: 4 INJECTION, SOLUTION INTRAMUSCULAR; INTRAVENOUS at 08:38

## 2018-10-25 RX ADMIN — FENTANYL CITRATE 100 MCG: 50 INJECTION, SOLUTION INTRAMUSCULAR; INTRAVENOUS at 09:32

## 2018-10-25 RX ADMIN — LIDOCAINE HYDROCHLORIDE 100 MG: 20 INJECTION, SOLUTION INFILTRATION; PERINEURAL at 09:32

## 2018-10-25 RX ADMIN — LIDOCAINE HYDROCHLORIDE 100 MG: 20 INJECTION, SOLUTION INFILTRATION; PERINEURAL at 10:29

## 2018-10-25 RX ADMIN — MIDAZOLAM HYDROCHLORIDE 1 MG: 1 INJECTION, SOLUTION INTRAMUSCULAR; INTRAVENOUS at 09:18

## 2018-10-25 RX ADMIN — PROPOFOL 200 MG: 10 INJECTION, EMULSION INTRAVENOUS at 09:36

## 2018-10-25 RX ADMIN — ROCURONIUM BROMIDE 5 MG: 10 INJECTION INTRAVENOUS at 10:00

## 2018-10-25 RX ADMIN — ROCURONIUM BROMIDE 5 MG: 10 INJECTION INTRAVENOUS at 09:32

## 2018-10-25 RX ADMIN — OXYCODONE HYDROCHLORIDE AND ACETAMINOPHEN 1 TABLET: 5; 325 TABLET ORAL at 11:22

## 2018-10-25 RX ADMIN — METOCLOPRAMIDE 10 MG: 5 INJECTION, SOLUTION INTRAMUSCULAR; INTRAVENOUS at 08:39

## 2018-10-25 RX ADMIN — ROCURONIUM BROMIDE 5 MG: 10 INJECTION INTRAVENOUS at 10:15

## 2018-10-25 RX ADMIN — GLYCOPYRROLATE 0.6 MG: 0.2 INJECTION INTRAMUSCULAR; INTRAVENOUS at 10:22

## 2018-10-25 RX ADMIN — ONDANSETRON 4 MG: 2 INJECTION, SOLUTION INTRAMUSCULAR; INTRAVENOUS at 08:39

## 2018-10-25 RX ADMIN — SUCCINYLCHOLINE CHLORIDE 200 MG: 20 INJECTION, SOLUTION INTRAMUSCULAR; INTRAVENOUS at 09:36

## 2018-10-25 RX ADMIN — SODIUM CHLORIDE, POTASSIUM CHLORIDE, SODIUM LACTATE AND CALCIUM CHLORIDE 9 ML/HR: 600; 310; 30; 20 INJECTION, SOLUTION INTRAVENOUS at 08:39

## 2018-10-25 RX ADMIN — FAMOTIDINE 20 MG: 10 INJECTION, SOLUTION INTRAVENOUS at 08:39

## 2018-10-25 NOTE — ANESTHESIA PROCEDURE NOTES
Airway  Urgency: elective    Date/Time: 10/25/2018 9:37 AM  End Time:10/25/2018 9:37 AM  Airway not difficult    General Information and Staff    Patient location during procedure: OR  CRNA: ZACKERY MCNEIL    Indications and Patient Condition  Indications for airway management: airway protection    Preoxygenated: yes  MILS maintained throughout  Mask difficulty assessment: 0 - not attempted (RSI)    Final Airway Details  Final airway type: endotracheal airway      Successful airway: ETT  Cuffed: yes   Successful intubation technique: direct laryngoscopy  Facilitating devices/methods: intubating stylet and cricoid pressure  Endotracheal tube insertion site: oral  Blade: Chin  Blade size: 2  ETT size: 7.5 mm  Cormack-Lehane Classification: grade I - full view of glottis  Placement verified by: chest auscultation and capnometry   Measured from: lips  ETT to lips (cm): 22  Number of attempts at approach: 1    Additional Comments  BEBS, +ETCO2, VSS. TEETH AND GUMS AS PRE-OP.

## 2018-10-25 NOTE — ANESTHESIA PREPROCEDURE EVALUATION
Anesthesia Evaluation     Patient summary reviewed and Nursing notes reviewed   no history of anesthetic complications:  NPO Solid Status: > 8 hours  NPO Liquid Status: > 2 hours           Airway   Mallampati: II  TM distance: >3 FB  Neck ROM: full  No difficulty expected  Dental    (+) poor dentition        Pulmonary - normal exam    breath sounds clear to auscultation  (+) a smoker (1 ppd x 25 yrs) Current Smoked day of surgery, COPD mild, asthma,     ROS comment: Used inhalers today  Cardiovascular - negative cardio ROS and normal exam  Exercise tolerance: good (4-7 METS)    Rhythm: regular  Rate: normal        Neuro/Psych  (+) headaches,     GI/Hepatic/Renal/Endo - negative ROS     Musculoskeletal     Abdominal  - normal exam   Substance History - negative use     OB/GYN          Other   (+) arthritis                     Anesthesia Plan    ASA 2     general     intravenous induction   Anesthetic plan, all risks, benefits, and alternatives have been provided, discussed and informed consent has been obtained with: patient.  Use of blood products discussed with patient  Consented to blood products.

## 2018-10-25 NOTE — ANESTHESIA POSTPROCEDURE EVALUATION
Patient: Alexandra Cantrell    Procedure Summary     Date:  10/25/18 Room / Location:   [facility] OR 2 /  [facility]    Anesthesia Start:  0931 Anesthesia Stop:  1037    Procedure:  DIAGNOSTIC LAPAROSCOPY with lysis of adhesions (N/A Abdomen) Diagnosis:       LLQ pain      Bilateral ovarian cysts      H/O abdominal hysterectomy      (LLQ pain [R10.32])      (Bilateral ovarian cysts [N83.201, N83.202])      (H/O abdominal hysterectomy [Z90.710])    Surgeon:  Joe Hastings MD Provider:  Dianna Sharif CRNA    Anesthesia Type:  general ASA Status:  2          Anesthesia Type: general  Last vitals  BP   106/84 (10/25/18 1109)   Temp   97.8 °F (36.6 °C) (10/25/18 1109)   Pulse   61 (10/25/18 1109)   Resp   12 (10/25/18 1109)     SpO2   98 % (10/25/18 1109)     Post Anesthesia Care and Evaluation    Patient location during evaluation: PHASE II  Patient participation: complete - patient participated  Level of consciousness: awake and alert  Pain score: 0  Pain management: adequate  Airway patency: patent  Anesthetic complications: No anesthetic complications  PONV Status: none  Cardiovascular status: acceptable  Respiratory status: acceptable  Hydration status: acceptable

## 2018-11-07 ENCOUNTER — OFFICE VISIT (OUTPATIENT)
Dept: OBSTETRICS AND GYNECOLOGY | Facility: CLINIC | Age: 38
End: 2018-11-07

## 2018-11-07 VITALS
SYSTOLIC BLOOD PRESSURE: 120 MMHG | BODY MASS INDEX: 25.07 KG/M2 | DIASTOLIC BLOOD PRESSURE: 60 MMHG | WEIGHT: 156 LBS | HEIGHT: 66 IN

## 2018-11-07 DIAGNOSIS — N73.6 PELVIC ADHESIVE DISEASE: ICD-10-CM

## 2018-11-07 DIAGNOSIS — F17.200 SMOKER: Primary | ICD-10-CM

## 2018-11-07 PROCEDURE — 99024 POSTOP FOLLOW-UP VISIT: CPT | Performed by: OBSTETRICS & GYNECOLOGY

## 2018-11-07 NOTE — PROGRESS NOTES
Surgical follow up visit     PATIENT INFORMATION  Alexandra Cantrell       - 1980    CHIEF COMPLAINT  Chief Complaint   Patient presents with   • Post-op       Alexandra Cantrell is a 38 y.o. female who presents to the clinic 2 weeks status post dx laparoscopy with extensive CRISTIAN for pelvic pain. Eating a regular diet without difficulty. Bowel movements are normal. Pain is controlled with current analgesics. Medications being used   Pain Medications             celecoxib (CeleBREX) 100 MG capsule Take 100 mg by mouth Daily.    ibuprofen (ADVIL,MOTRIN) 800 MG tablet Take 800 mg by mouth Every 6 (Six) Hours As Needed for Mild Pain  (ARTHRITIS).    ibuprofen (ADVIL,MOTRIN) 800 MG tablet Take 1 tablet by mouth Every 8 (Eight) Hours As Needed for Mild Pain .      .    HISTORY OF PRESENT ILLNESS  HPI        REVIEW OF SYSTEMS  Review of Systems   Constitutional: Negative.    HENT: Negative.    Eyes: Negative.    Respiratory: Negative.    Cardiovascular: Negative.    Gastrointestinal: Positive for abdominal pain.   Endocrine: Negative.    Genitourinary: Positive for pelvic pain.   Musculoskeletal: Negative.    Skin: Negative.    Allergic/Immunologic: Negative.    Neurological: Negative.    Hematological: Negative.    Psychiatric/Behavioral: Negative.          ACTIVE PROBLEMS  Patient Active Problem List    Diagnosis   • Pelvic adhesive disease [N73.6]   • LLQ pain [R10.32]   • H/O abdominal hysterectomy-for chronic DUB [Z90.710]   • Smoker [F17.200]   • Bilateral ovarian cysts [N83.201, N83.202]         PAST MEDICAL HISTORY  Past Medical History:   Diagnosis Date   • Abdominal pain     SCHEDULED FOR SX   • Abscess     LEFT UPPER JAW, TOOTH BROKE OFF   • Arthritis     FINGERS & TOES   • Asthma    • COPD (chronic obstructive pulmonary disease) (CMS/HCC)    • Migraines          SURGICAL HISTORY  Past Surgical History:   Procedure Laterality Date   • APPENDECTOMY     •  SECTION      X3   • CHOLECYSTECTOMY      LAP  "  • DIAGNOSTIC LAPAROSCOPY N/A 10/25/2018    Procedure: DIAGNOSTIC LAPAROSCOPY with lysis of adhesions;  Surgeon: Joe Hastings MD;  Location: [facility];  Service: Obstetrics/Gynecology   • HYSTERECTOMY      OPEN   • TUBAL ABDOMINAL LIGATION           FAMILY HISTORY  History reviewed. No pertinent family history.      SOCIAL HISTORY  Social History     Occupational History   • Not on file.     Social History Main Topics   • Smoking status: Current Every Day Smoker     Packs/day: 1.00     Years: 25.00     Types: Cigarettes   • Smokeless tobacco: Never Used   • Alcohol use No   • Drug use: No   • Sexual activity: Defer         CURRENT MEDICATIONS    Current Outpatient Prescriptions:   •  albuterol (PROVENTIL HFA;VENTOLIN HFA) 108 (90 BASE) MCG/ACT inhaler, Inhale 2 puffs every 4 (four) hours as needed for wheezing., Disp: , Rfl:   •  albuterol (PROVENTIL) (2.5 MG/3ML) 0.083% nebulizer solution, Take 2.5 mg by nebulization every 4 (four) hours as needed for wheezing., Disp: , Rfl:   •  celecoxib (CeleBREX) 100 MG capsule, Take 100 mg by mouth Daily., Disp: , Rfl:   •  CETIRIZINE HCL PO, Take 10 mg by mouth., Disp: , Rfl:   •  ibuprofen (ADVIL,MOTRIN) 800 MG tablet, Take 800 mg by mouth Every 6 (Six) Hours As Needed for Mild Pain  (ARTHRITIS)., Disp: , Rfl:   •  ibuprofen (ADVIL,MOTRIN) 800 MG tablet, Take 1 tablet by mouth Every 8 (Eight) Hours As Needed for Mild Pain ., Disp: 30 tablet, Rfl: 0  •  promethazine (PHENERGAN) 25 MG tablet, Take 1 tablet by mouth Every 6 (Six) Hours As Needed for nausea or vomiting for up to 12 doses., Disp: 12 tablet, Rfl: 0  •  tiotropium (SPIRIVA) 18 MCG per inhalation capsule, Place 1 capsule into inhaler and inhale 1 (one) time daily., Disp: , Rfl:     ALLERGIES  Aspirin and Bactrim [sulfamethoxazole-trimethoprim]    VITALS  Vitals:    11/07/18 0920   BP: 120/60   Weight: 70.8 kg (156 lb)   Height: 168 cm (66.14\")       LAST RESULTS   Results Encounter on 10/15/2018 " "  Component Date Value Ref Range Status   • WBC 10/25/2018 6.13  4.80 - 10.80 10*3/mm3 Final   • RBC 10/25/2018 5.12  4.20 - 5.40 10*6/mm3 Final   • Hemoglobin 10/25/2018 14.9  12.0 - 16.0 g/dL Final   • Hematocrit 10/25/2018 44.6  37.0 - 47.0 % Final   • MCV 10/25/2018 87.1  81.0 - 99.0 fL Final   • MCH 10/25/2018 29.1  27.0 - 31.0 pg Final   • MCHC 10/25/2018 33.4  31.0 - 37.0 g/dL Final   • RDW 10/25/2018 14.0  11.5 - 14.5 % Final   • RDW-SD 10/25/2018 45.4  37.0 - 54.0 fl Final   • MPV 10/25/2018 10.2  7.4 - 10.4 fL Final   • Platelets 10/25/2018 232  140 - 500 10*3/mm3 Final   • Neutrophil % 10/25/2018 55.7  45.0 - 70.0 % Final   • Lymphocyte % 10/25/2018 30.7  20.0 - 45.0 % Final   • Monocyte % 10/25/2018 10.3* 3.0 - 8.0 % Final   • Eosinophil % 10/25/2018 2.0  0.0 - 4.0 % Final   • Basophil % 10/25/2018 1.1  0.0 - 2.0 % Final   • Immature Grans % 10/25/2018 0.2  0.0 - 0.5 % Final   • Neutrophils, Absolute 10/25/2018 3.42  1.50 - 8.30 10*3/mm3 Final   • Lymphocytes, Absolute 10/25/2018 1.88  0.60 - 4.80 10*3/mm3 Final   • Monocytes, Absolute 10/25/2018 0.63  0.00 - 1.00 10*3/mm3 Final   • Eosinophils, Absolute 10/25/2018 0.12  0.10 - 0.30 10*3/mm3 Final   • Basophils, Absolute 10/25/2018 0.07  0.00 - 0.20 10*3/mm3 Final   • Immature Grans, Absolute 10/25/2018 0.01  0.00 - 0.03 10*3/mm3 Final   • nRBC 10/25/2018 0.0  0.0 - 0.0 /100 WBC Final     No results found.    PHYSICAL EXAM  /60   Ht 168 cm (66.14\")   Wt 70.8 kg (156 lb)   LMP  (LMP Unknown)   BMI 25.07 kg/m²   Physical Exam   Constitutional: She is oriented to person, place, and time. She appears well-developed and well-nourished.   Abdominal: Soft. She exhibits no distension and no mass. There is no tenderness. There is no rebound and no guarding. No hernia.   Incision clean dry and intact   Musculoskeletal: Normal range of motion.   Neurological: She is alert and oriented to person, place, and time.   Skin: Skin is warm and dry. "   Psychiatric: She has a normal mood and affect. Her behavior is normal. Judgment and thought content normal.   Nursing note and vitals reviewed.    Ready to quit: Not Answered  Counseling given: Not Answered  .  I advised the patient of the risks in continuing to use tobacco, and I provided this patient with smoking cessation educational materials.  I also discussed how to quit smoking and the patient has expressed the willingness to quit.    During this visit, I spent > 3-10 minutes counseling the patient regarding smoking cessation.         Assessment     1) Post op Doing well postoperatively.  2) Operative findings again reviewed. Pathology report discussed.     Plan    1. Continue any current medications.  2. Wound care discussed.  3. Activity restrictions: none to continue  4. Anticipated return to work: now.    Alexandra was seen today for post-op.    Diagnoses and all orders for this visit:    Smoker    Pelvic adhesive disease        RTO Return in about 1 year (around 11/7/2019) for Annual physical.      Joe Hastings MD    11/7/2018  9:38 AM        ASSESSMENT  Diagnoses and all orders for this visit:    Smoker    Pelvic adhesive disease      Encounter Diagnoses   Name Primary?   • Smoker Yes   • Pelvic adhesive disease          PLAN  Return in about 1 year (around 11/7/2019) for Annual physical.    Joe Hastings MD  9:38 AM  @today@

## 2018-11-21 ENCOUNTER — OFFICE VISIT (OUTPATIENT)
Dept: OBSTETRICS AND GYNECOLOGY | Facility: CLINIC | Age: 38
End: 2018-11-21

## 2018-11-21 VITALS
BODY MASS INDEX: 25.39 KG/M2 | SYSTOLIC BLOOD PRESSURE: 140 MMHG | WEIGHT: 158 LBS | DIASTOLIC BLOOD PRESSURE: 70 MMHG | HEIGHT: 66 IN

## 2018-11-21 DIAGNOSIS — N94.10 DYSPAREUNIA IN FEMALE: ICD-10-CM

## 2018-11-21 DIAGNOSIS — R10.32 LLQ PAIN: ICD-10-CM

## 2018-11-21 DIAGNOSIS — Z13.9 SCREENING FOR CONDITION: Primary | ICD-10-CM

## 2018-11-21 DIAGNOSIS — N83.202 BILATERAL OVARIAN CYSTS: ICD-10-CM

## 2018-11-21 DIAGNOSIS — N83.201 BILATERAL OVARIAN CYSTS: ICD-10-CM

## 2018-11-21 DIAGNOSIS — Z90.710 H/O ABDOMINAL HYSTERECTOMY: ICD-10-CM

## 2018-11-21 DIAGNOSIS — F17.200 SMOKER: ICD-10-CM

## 2018-11-21 DIAGNOSIS — N73.6 PELVIC ADHESIVE DISEASE: ICD-10-CM

## 2018-11-21 LAB
BILIRUB BLD-MCNC: NEGATIVE MG/DL
CLARITY, POC: CLEAR
COLOR UR: YELLOW
GLUCOSE UR STRIP-MCNC: NEGATIVE MG/DL
KETONES UR QL: NEGATIVE
LEUKOCYTE EST, POC: NEGATIVE
NITRITE UR-MCNC: NEGATIVE MG/ML
PH UR: 6 [PH] (ref 5–8)
PROT UR STRIP-MCNC: NEGATIVE MG/DL
RBC # UR STRIP: NEGATIVE /UL
SP GR UR: 1.01 (ref 1–1.03)
UROBILINOGEN UR QL: NORMAL

## 2018-11-21 PROCEDURE — 99024 POSTOP FOLLOW-UP VISIT: CPT | Performed by: OBSTETRICS & GYNECOLOGY

## 2018-11-21 PROCEDURE — 81002 URINALYSIS NONAUTO W/O SCOPE: CPT | Performed by: OBSTETRICS & GYNECOLOGY

## 2018-11-21 NOTE — PROGRESS NOTES
Surgical follow up visit     PATIENT INFORMATION  Alexandra Cantrell       - 1980    CHIEF COMPLAINT  Chief Complaint   Patient presents with   • Post-op     incision redness and drainage   • Dysmenorrhea     frequency urinating       Alexandra Cantrell is a 38 y.o. female who presents to the clinic 3 weeks status post dx laparoscopy w/ LO Afor pelvic pain. Eating a regular diet without difficulty. Bowel movements are normal. Pain is controlled with current analgesics. Medications being used   Pain Medications             celecoxib (CeleBREX) 100 MG capsule Take 100 mg by mouth Daily.    ibuprofen (ADVIL,MOTRIN) 800 MG tablet Take 800 mg by mouth Every 6 (Six) Hours As Needed for Mild Pain  (ARTHRITIS).    ibuprofen (ADVIL,MOTRIN) 800 MG tablet Take 1 tablet by mouth Every 8 (Eight) Hours As Needed for Mild Pain .      .    HISTORY OF PRESENT ILLNESS  HPI        REVIEW OF SYSTEMS  Review of Systems   Constitutional: Negative.    Respiratory: Negative.    Cardiovascular: Negative.    Gastrointestinal: Positive for abdominal pain.   Genitourinary: Positive for dyspareunia.   Neurological: Negative.    Psychiatric/Behavioral: Negative.          ACTIVE PROBLEMS  Patient Active Problem List    Diagnosis   • Dyspareunia in female [N94.10]   • Pelvic adhesive disease [N73.6]   • LLQ pain [R10.32]   • H/O abdominal hysterectomy-for chronic DUB [Z90.710]   • Smoker [F17.200]   • Bilateral ovarian cysts [N83.201, N83.202]         PAST MEDICAL HISTORY  Past Medical History:   Diagnosis Date   • Abdominal pain     SCHEDULED FOR SX   • Abscess     LEFT UPPER JAW, TOOTH BROKE OFF   • Arthritis     FINGERS & TOES   • Asthma    • COPD (chronic obstructive pulmonary disease) (CMS/HCC)    • Migraines          SURGICAL HISTORY  Past Surgical History:   Procedure Laterality Date   • APPENDECTOMY     •  SECTION      X3   • CHOLECYSTECTOMY      LAP   • HYSTERECTOMY      OPEN   • TUBAL ABDOMINAL LIGATION           FAMILY  "HISTORY  No family history on file.      SOCIAL HISTORY  Social History     Occupational History   • Not on file   Tobacco Use   • Smoking status: Current Every Day Smoker     Packs/day: 1.00     Years: 25.00     Pack years: 25.00     Types: Cigarettes   • Smokeless tobacco: Never Used   Substance and Sexual Activity   • Alcohol use: No   • Drug use: No   • Sexual activity: Defer         CURRENT MEDICATIONS    Current Outpatient Medications:   •  albuterol (PROVENTIL HFA;VENTOLIN HFA) 108 (90 BASE) MCG/ACT inhaler, Inhale 2 puffs every 4 (four) hours as needed for wheezing., Disp: , Rfl:   •  albuterol (PROVENTIL) (2.5 MG/3ML) 0.083% nebulizer solution, Take 2.5 mg by nebulization every 4 (four) hours as needed for wheezing., Disp: , Rfl:   •  celecoxib (CeleBREX) 100 MG capsule, Take 100 mg by mouth Daily., Disp: , Rfl:   •  CETIRIZINE HCL PO, Take 10 mg by mouth., Disp: , Rfl:   •  ibuprofen (ADVIL,MOTRIN) 800 MG tablet, Take 800 mg by mouth Every 6 (Six) Hours As Needed for Mild Pain  (ARTHRITIS)., Disp: , Rfl:   •  ibuprofen (ADVIL,MOTRIN) 800 MG tablet, Take 1 tablet by mouth Every 8 (Eight) Hours As Needed for Mild Pain ., Disp: 30 tablet, Rfl: 0  •  promethazine (PHENERGAN) 25 MG tablet, Take 1 tablet by mouth Every 6 (Six) Hours As Needed for nausea or vomiting for up to 12 doses., Disp: 12 tablet, Rfl: 0  •  tiotropium (SPIRIVA) 18 MCG per inhalation capsule, Place 1 capsule into inhaler and inhale 1 (one) time daily., Disp: , Rfl:     ALLERGIES  Aspirin and Bactrim [sulfamethoxazole-trimethoprim]    VITALS  Vitals:    11/21/18 0928   BP: 140/70   Weight: 71.7 kg (158 lb)   Height: 168 cm (66.14\")       LAST RESULTS   Results Encounter on 10/15/2018   Component Date Value Ref Range Status   • WBC 10/25/2018 6.13  4.80 - 10.80 10*3/mm3 Final   • RBC 10/25/2018 5.12  4.20 - 5.40 10*6/mm3 Final   • Hemoglobin 10/25/2018 14.9  12.0 - 16.0 g/dL Final   • Hematocrit 10/25/2018 44.6  37.0 - 47.0 % Final   • MCV " "10/25/2018 87.1  81.0 - 99.0 fL Final   • MCH 10/25/2018 29.1  27.0 - 31.0 pg Final   • MCHC 10/25/2018 33.4  31.0 - 37.0 g/dL Final   • RDW 10/25/2018 14.0  11.5 - 14.5 % Final   • RDW-SD 10/25/2018 45.4  37.0 - 54.0 fl Final   • MPV 10/25/2018 10.2  7.4 - 10.4 fL Final   • Platelets 10/25/2018 232  140 - 500 10*3/mm3 Final   • Neutrophil % 10/25/2018 55.7  45.0 - 70.0 % Final   • Lymphocyte % 10/25/2018 30.7  20.0 - 45.0 % Final   • Monocyte % 10/25/2018 10.3* 3.0 - 8.0 % Final   • Eosinophil % 10/25/2018 2.0  0.0 - 4.0 % Final   • Basophil % 10/25/2018 1.1  0.0 - 2.0 % Final   • Immature Grans % 10/25/2018 0.2  0.0 - 0.5 % Final   • Neutrophils, Absolute 10/25/2018 3.42  1.50 - 8.30 10*3/mm3 Final   • Lymphocytes, Absolute 10/25/2018 1.88  0.60 - 4.80 10*3/mm3 Final   • Monocytes, Absolute 10/25/2018 0.63  0.00 - 1.00 10*3/mm3 Final   • Eosinophils, Absolute 10/25/2018 0.12  0.10 - 0.30 10*3/mm3 Final   • Basophils, Absolute 10/25/2018 0.07  0.00 - 0.20 10*3/mm3 Final   • Immature Grans, Absolute 10/25/2018 0.01  0.00 - 0.03 10*3/mm3 Final   • nRBC 10/25/2018 0.0  0.0 - 0.0 /100 WBC Final     No results found.    PHYSICAL EXAM  /70   Ht 168 cm (66.14\")   Wt 71.7 kg (158 lb)   LMP  (LMP Unknown)   BMI 25.39 kg/m²   Physical Exam   Constitutional: She is oriented to person, place, and time. She appears well-developed and well-nourished.   Abdominal: Soft. Bowel sounds are normal. She exhibits no distension and no mass. There is no tenderness. There is no rebound and no guarding. No hernia.   Incision clean dry and intact   Musculoskeletal: Normal range of motion.   Neurological: She is alert and oriented to person, place, and time.   Skin: Skin is warm and dry.   Psychiatric: She has a normal mood and affect. Her behavior is normal. Judgment and thought content normal.   Nursing note and vitals reviewed.      Assessment     1) Post op Doing well postoperatively.  2) Operative findings again reviewed.  3) " pt has some new onset dyspareunia that was addressed.      Plan    1. Continue any current medications.  2. Wound care discussed.  3. Activity restrictions: none to continue  4. Anticipated return to work: now.  5.  Pt needs to RTO for annuals.   6.  Lubrication and positioning for dyspareunia.    Alexandra was seen today for post-op and dysmenorrhea.    Diagnoses and all orders for this visit:    Screening for condition  -     POC Urinalysis Dipstick    Bilateral ovarian cysts    LLQ pain    H/O abdominal hysterectomy-for chronic DUB    Smoker    Pelvic adhesive disease    Dyspareunia in female        RTO Return in about 1 year (around 11/21/2019) for Annual physical.      Joe Hastings MD    11/21/2018  9:46 AM        ASSESSMENT  Diagnoses and all orders for this visit:    Screening for condition  -     POC Urinalysis Dipstick    Bilateral ovarian cysts    LLQ pain    H/O abdominal hysterectomy-for chronic DUB    Smoker    Pelvic adhesive disease    Dyspareunia in female      Encounter Diagnoses   Name Primary?   • Screening for condition Yes   • Bilateral ovarian cysts    • LLQ pain    • H/O abdominal hysterectomy-for chronic DUB    • Smoker    • Pelvic adhesive disease    • Dyspareunia in female          PLAN  Return in about 1 year (around 11/21/2019) for Annual physical.    Joe Hastings MD  9:46 AM  @today@

## 2019-07-31 ENCOUNTER — OFFICE VISIT (OUTPATIENT)
Dept: FAMILY MEDICINE CLINIC | Facility: CLINIC | Age: 39
End: 2019-07-31

## 2019-07-31 VITALS
TEMPERATURE: 97.6 F | DIASTOLIC BLOOD PRESSURE: 80 MMHG | OXYGEN SATURATION: 97 % | HEIGHT: 66 IN | WEIGHT: 143 LBS | SYSTOLIC BLOOD PRESSURE: 119 MMHG | HEART RATE: 56 BPM | BODY MASS INDEX: 22.98 KG/M2

## 2019-07-31 DIAGNOSIS — R63.4 WEIGHT LOSS, UNINTENTIONAL: ICD-10-CM

## 2019-07-31 DIAGNOSIS — J45.20 MILD INTERMITTENT ASTHMA, UNSPECIFIED WHETHER COMPLICATED: ICD-10-CM

## 2019-07-31 DIAGNOSIS — J44.9 CHRONIC OBSTRUCTIVE PULMONARY DISEASE, UNSPECIFIED COPD TYPE (HCC): ICD-10-CM

## 2019-07-31 DIAGNOSIS — J30.9 ALLERGIC RHINITIS, UNSPECIFIED SEASONALITY, UNSPECIFIED TRIGGER: ICD-10-CM

## 2019-07-31 DIAGNOSIS — M19.90 ARTHRITIS: ICD-10-CM

## 2019-07-31 DIAGNOSIS — Z00.00 PREVENTATIVE HEALTH CARE: Primary | ICD-10-CM

## 2019-07-31 DIAGNOSIS — F51.01 PRIMARY INSOMNIA: ICD-10-CM

## 2019-07-31 DIAGNOSIS — Z12.31 ENCOUNTER FOR SCREENING MAMMOGRAM FOR BREAST CANCER: ICD-10-CM

## 2019-07-31 PROBLEM — J45.909 ASTHMA: Status: ACTIVE | Noted: 2017-10-16

## 2019-07-31 PROCEDURE — 99204 OFFICE O/P NEW MOD 45 MIN: CPT | Performed by: NURSE PRACTITIONER

## 2019-07-31 RX ORDER — IBUPROFEN 800 MG/1
800 TABLET ORAL EVERY 8 HOURS PRN
Qty: 30 TABLET | Refills: 0 | Status: SHIPPED | OUTPATIENT
Start: 2019-07-31

## 2019-07-31 RX ORDER — TRAZODONE HYDROCHLORIDE 50 MG/1
TABLET ORAL
Qty: 14 TABLET | Refills: 2 | Status: SHIPPED | OUTPATIENT
Start: 2019-07-31 | End: 2022-04-08

## 2019-07-31 RX ORDER — LORATADINE 10 MG/1
10 TABLET ORAL DAILY
COMMUNITY
End: 2019-07-31 | Stop reason: SDUPTHER

## 2019-07-31 RX ORDER — LORATADINE 10 MG/1
10 TABLET ORAL DAILY
Qty: 1 TABLET | Refills: 6 | Status: SHIPPED | OUTPATIENT
Start: 2019-07-31 | End: 2021-09-05

## 2019-07-31 RX ORDER — BUTALBITAL, ACETAMINOPHEN, CAFFEINE AND CODEINE PHOSPHATE 50; 325; 40; 30 MG/1; MG/1; MG/1; MG/1
1 CAPSULE ORAL EVERY 6 HOURS PRN
COMMUNITY
End: 2019-07-31 | Stop reason: ALTCHOICE

## 2019-07-31 NOTE — PATIENT INSTRUCTIONS
Fasting blood work today   schedule eye exam   keep appointment for mammogram   take trazodone as directed and let office know if it helps with your sleep   consider CBD tablets

## 2019-07-31 NOTE — PROGRESS NOTES
Subjective   Alexandra Cantrell is a 39 y.o. female.     39-year-old white female smoker with history of chronic headaches, allergic rhinitis, COPD, asthma, insomnia, arthritis pain in all joints, unintentional weight loss, partial hysterectomy and cholecystectomy who comes in today to establish as a new patient.  Patient states she still has daily headaches that originate on the both sides of her eyes and going toward the center of her head and she is getting ready to schedule eye exam to make sure there is no issues with her vision triggering her headaches.  She also complains of chronic insomnia and states she gets very little sleep and I am going to try her on a trial of trazodone and we discussed caffeine use.  She also states she is lost about 10 pounds in the past several months has been unintentional and feels like she eats as much she always did I will be checking blood work including thyroid on her today.  Current weight is 143  Blood pressure 118/80 heart rate 56 he denies any chest pain, tachycardia, dyspnea, dizziness or edema  Patient had a great deal of joint pain especially in hands and knees and takes gabapentin to 3 times a day and we discussed CBD tablets as an alternative    Fasting blood work today  Trazodone 50 mg 1/2-2 tabs nightly as needed sleep  Mammogram  Schedule eye appointment         The following portions of the patient's history were reviewed and updated as appropriate: allergies, current medications, past family history, past medical history, past social history, past surgical history and problem list.    Review of Systems   Constitutional: Positive for unexpected weight loss.   HENT: Positive for postnasal drip and sore throat.    Eyes: Negative.    Respiratory: Positive for cough.    Cardiovascular: Negative.    Gastrointestinal: Negative.    Genitourinary: Negative.    Musculoskeletal:        Ch joint pain   Skin: Negative.    Neurological: Negative.    Psychiatric/Behavioral:  Positive for sleep disturbance.       Objective   Physical Exam   Constitutional: She is oriented to person, place, and time. She appears well-developed and well-nourished.   Eyes: EOM are normal.   Cardiovascular: Normal rate and regular rhythm.   Pulmonary/Chest: Effort normal and breath sounds normal.   Diminished breath sounds   Abdominal: Soft. Bowel sounds are normal.   Musculoskeletal:   ch arthtitis pain anselmo in hands and knees   Neurological: She is alert and oriented to person, place, and time.   ch headaches   Skin: Skin is warm and dry.   Psychiatric: She has a normal mood and affect.         Assessment/Plan   Alexandra was seen today for new patient, asthma and arthritis.    Diagnoses and all orders for this visit:    Preventative health care  -     CBC (No Diff)  -     Comprehensive Metabolic Panel  -     Lipid Panel With LDL / HDL Ratio    Weight loss, unintentional  -     Comprehensive Metabolic Panel  -     TSH+Free T4  -     T3    Primary insomnia  -     traZODone (DESYREL) 50 MG tablet; 1/2 - 2 tabs 30 minutes before bedtime    Allergic rhinitis, unspecified seasonality, unspecified trigger    Mild intermittent asthma, unspecified whether complicated    Chronic obstructive pulmonary disease, unspecified COPD type (CMS/[facility])    Arthritis    Other orders  -     loratadine (CLARITIN) 10 MG tablet; Take 1 tablet by mouth Daily.  -     ibuprofen (ADVIL,MOTRIN) 800 MG tablet; Take 1 tablet by mouth Every 8 (Eight) Hours As Needed for Mild Pain .  -     albuterol (PROAIR RESPICLICK) 108 (90 Base) MCG/ACT inhaler; Inhale 2 puffs Every 4 (Four) Hours As Needed for Wheezing (asthma).  -     mometasone-formoterol (DULERA 100) 100-5 MCG/ACT inhaler; Inhale 2 puffs 2 (Two) Times a Day.

## 2019-08-01 LAB
ALBUMIN SERPL-MCNC: 4.2 G/DL (ref 3.5–5.5)
ALBUMIN/GLOB SERPL: 1.9 {RATIO} (ref 1.2–2.2)
ALP SERPL-CCNC: 68 IU/L (ref 39–117)
ALT SERPL-CCNC: 8 IU/L (ref 0–32)
AST SERPL-CCNC: 12 IU/L (ref 0–40)
BILIRUB SERPL-MCNC: 0.3 MG/DL (ref 0–1.2)
BUN SERPL-MCNC: 16 MG/DL (ref 6–24)
BUN/CREAT SERPL: 19 (ref 9–23)
CALCIUM SERPL-MCNC: 8.8 MG/DL (ref 8.7–10.2)
CHLORIDE SERPL-SCNC: 106 MMOL/L (ref 96–106)
CHOLEST SERPL-MCNC: 149 MG/DL (ref 100–199)
CO2 SERPL-SCNC: 22 MMOL/L (ref 20–29)
CREAT SERPL-MCNC: 0.83 MG/DL (ref 0.57–1)
ERYTHROCYTE [DISTWIDTH] IN BLOOD BY AUTOMATED COUNT: 15.1 % (ref 12.3–15.4)
GLOBULIN SER CALC-MCNC: 2.2 G/DL (ref 1.5–4.5)
GLUCOSE SERPL-MCNC: 86 MG/DL (ref 65–99)
HCT VFR BLD AUTO: 45.4 % (ref 34–46.6)
HDLC SERPL-MCNC: 42 MG/DL
HGB BLD-MCNC: 14.7 G/DL (ref 11.1–15.9)
LDLC SERPL CALC-MCNC: 93 MG/DL (ref 0–99)
LDLC/HDLC SERPL: 2.2 RATIO (ref 0–3.2)
MCH RBC QN AUTO: 29.2 PG (ref 26.6–33)
MCHC RBC AUTO-ENTMCNC: 32.4 G/DL (ref 31.5–35.7)
MCV RBC AUTO: 90 FL (ref 79–97)
PLATELET # BLD AUTO: 239 X10E3/UL (ref 150–450)
POTASSIUM SERPL-SCNC: 3.9 MMOL/L (ref 3.5–5.2)
PROT SERPL-MCNC: 6.4 G/DL (ref 6–8.5)
RBC # BLD AUTO: 5.04 X10E6/UL (ref 3.77–5.28)
SODIUM SERPL-SCNC: 141 MMOL/L (ref 134–144)
T3 SERPL-MCNC: 116 NG/DL (ref 71–180)
T4 FREE SERPL-MCNC: 1.03 NG/DL (ref 0.82–1.77)
TRIGL SERPL-MCNC: 72 MG/DL (ref 0–149)
TSH SERPL DL<=0.005 MIU/L-ACNC: 2.53 UIU/ML (ref 0.45–4.5)
VLDLC SERPL CALC-MCNC: 14 MG/DL (ref 5–40)
WBC # BLD AUTO: 7 X10E3/UL (ref 3.4–10.8)

## 2019-08-05 ENCOUNTER — TELEPHONE (OUTPATIENT)
Dept: FAMILY MEDICINE CLINIC | Facility: CLINIC | Age: 39
End: 2019-08-05

## 2019-08-05 RX ORDER — POTASSIUM CHLORIDE 20 MEQ/1
40 TABLET, EXTENDED RELEASE ORAL 2 TIMES DAILY
Qty: 4 TABLET | Refills: 0 | Status: SHIPPED | OUTPATIENT
Start: 2019-08-05 | End: 2019-08-06

## 2019-08-05 NOTE — TELEPHONE ENCOUNTER
I think Advair or Symbicort.  What dose, I will try one and if that doesn't work I will try the other.  SG

## 2019-08-05 NOTE — TELEPHONE ENCOUNTER
Can send  order to  [facility] for CBD tablets 50 mg 1 daily 6 refills    She will have to call her insurance company to find out which steroid inhaler is covered

## 2019-08-05 NOTE — TELEPHONE ENCOUNTER
Pt said work was ok with CBD if it was a Rx from .  Also the Dulera inhaler was not covered.  Needs cheaper steroid inhaler.  SG

## 2019-08-07 ENCOUNTER — HOSPITAL ENCOUNTER (OUTPATIENT)
Dept: MAMMOGRAPHY | Facility: HOSPITAL | Age: 39
Discharge: HOME | End: 2019-08-07
Admitting: NURSE PRACTITIONER

## 2019-08-07 DIAGNOSIS — Z12.31 ENCOUNTER FOR SCREENING MAMMOGRAM FOR BREAST CANCER: ICD-10-CM

## 2019-08-07 PROCEDURE — 77067 SCR MAMMO BI INCL CAD: CPT

## 2019-08-07 PROCEDURE — 77063 BREAST TOMOSYNTHESIS BI: CPT

## 2021-03-30 ENCOUNTER — OFFICE VISIT (OUTPATIENT)
Dept: OBSTETRICS AND GYNECOLOGY | Facility: CLINIC | Age: 41
End: 2021-03-30

## 2021-03-30 VITALS
BODY MASS INDEX: 27.88 KG/M2 | WEIGHT: 177.6 LBS | SYSTOLIC BLOOD PRESSURE: 110 MMHG | HEIGHT: 67 IN | DIASTOLIC BLOOD PRESSURE: 60 MMHG

## 2021-03-30 DIAGNOSIS — F17.200 SMOKER: ICD-10-CM

## 2021-03-30 DIAGNOSIS — Z01.419 PAP SMEAR, LOW-RISK: ICD-10-CM

## 2021-03-30 DIAGNOSIS — Z01.419 ROUTINE GYNECOLOGICAL EXAMINATION: ICD-10-CM

## 2021-03-30 DIAGNOSIS — Z90.710 H/O ABDOMINAL HYSTERECTOMY: ICD-10-CM

## 2021-03-30 DIAGNOSIS — J45.20 MILD INTERMITTENT ASTHMA WITHOUT COMPLICATION: ICD-10-CM

## 2021-03-30 DIAGNOSIS — Z20.2 EXPOSURE TO STD: Primary | ICD-10-CM

## 2021-03-30 PROBLEM — N94.10 DYSPAREUNIA IN FEMALE: Status: RESOLVED | Noted: 2018-11-21 | Resolved: 2021-03-30

## 2021-03-30 PROBLEM — R10.32 LLQ PAIN: Status: RESOLVED | Noted: 2018-10-10 | Resolved: 2021-03-30

## 2021-03-30 PROCEDURE — 99396 PREV VISIT EST AGE 40-64: CPT | Performed by: OBSTETRICS & GYNECOLOGY

## 2021-03-30 PROCEDURE — 81002 URINALYSIS NONAUTO W/O SCOPE: CPT | Performed by: OBSTETRICS & GYNECOLOGY

## 2021-03-30 NOTE — PROGRESS NOTES
GYN Annual Exam     CC- Here for annual exam.     Pt new to practice? Yes  Pt new to me? Yes     Alexandra Cantrell is a 41 y.o. No obstetric history on file. female who presents for annual well woman exam. No LMP recorded (lmp unknown). Patient has had a hysterectomy.    Problems in addition to need for annual: pt requests STD testing due to infidelity in relationship.  Pt needs mammogram.     HPI: History of Present Illness    PMHX:  Patient Active Problem List   Diagnosis   • H/O abdominal hysterectomy-for chronic DUB   • Smoker   • Bilateral ovarian cysts   • Pelvic adhesive disease   • Asthma   • Disorder of gallbladder   • Rheumatoid arthritis (CMS/HCC)   • Exposure to STD   ; otherwise none    OB History    No obstetric history on file.           Past Medical History:   Diagnosis Date   • Abdominal pain     SCHEDULED FOR SX   • Abscess     LEFT UPPER JAW, TOOTH BROKE OFF   • Arthritis     FINGERS & TOES   • Asthma    • COPD (chronic obstructive pulmonary disease) (CMS/HCC)    • Migraines        Past Surgical History:   Procedure Laterality Date   • APPENDECTOMY     •  SECTION      X3   • CHOLECYSTECTOMY      LAP   • DIAGNOSTIC LAPAROSCOPY N/A 10/25/2018    Procedure: DIAGNOSTIC LAPAROSCOPY with lysis of adhesions;  Surgeon: Joe Hastings MD;  Location: [facility];  Service: Obstetrics/Gynecology   • HYSTERECTOMY      OPEN   • TUBAL ABDOMINAL LIGATION           Current Outpatient Medications:   •  albuterol (PROAIR RESPICLICK) 108 (90 Base) MCG/ACT inhaler, Inhale 2 puffs Every 4 (Four) Hours As Needed for Wheezing (asthma)., Disp: 1 inhaler, Rfl: 11  •  fluticasone-salmeterol (ADVAIR DISKUS) 100-50 MCG/DOSE DISKUS, Inhale 1 puff 2 (Two) Times a Day., Disp: 60 each, Rfl: 5  •  ibuprofen (ADVIL,MOTRIN) 800 MG tablet, Take 1 tablet by mouth Every 8 (Eight) Hours As Needed for Mild Pain ., Disp: 30 tablet, Rfl: 0  •  loratadine (CLARITIN) 10 MG tablet, Take 1 tablet by mouth Daily., Disp: 1  "tablet, Rfl: 6  •  traZODone (DESYREL) 50 MG tablet, 1/2 - 2 tabs 30 minutes before bedtime, Disp: 14 tablet, Rfl: 2    Allergies   Allergen Reactions   • Aspirin Anaphylaxis   • Bactrim [Sulfamethoxazole-Trimethoprim] Anaphylaxis       Social History     Tobacco Use   • Smoking status: Current Every Day Smoker     Packs/day: 1.00     Years: 25.00     Pack years: 25.00     Types: Cigarettes   • Smokeless tobacco: Never Used   Substance Use Topics   • Alcohol use: No   • Drug use: No       Alexandra Cantrell  reports that she has been smoking cigarettes. She has a 25.00 pack-year smoking history. She has never used smokeless tobacco.. I have educated her on the risk of diseases from using tobacco products such as cancer, COPD and heart disease.     I advised her to quit            Family History   Problem Relation Age of Onset   • Heart attack Mother    • Diabetes Mother    • Heart disease Father    • Stroke Father    • Lung cancer Father    • Heart attack Sister    • Heart attack Brother    • Diabetes Maternal Grandmother    • Diabetes Paternal Grandmother    • Heart attack Brother    • Heart attack Brother    • Heart attack Sister    • Heart attack Sister    • Ovarian cancer Sister        Review of Systems    Patient reports that she is not currently experiencing any symptoms of urinary incontinence.      yesTESTED FOR CHLAMYDIA?    EXAM:  /60   Ht 168.9 cm (66.5\")   Wt 80.6 kg (177 lb 9.6 oz)   LMP  (LMP Unknown)   Breastfeeding No   BMI 28.24 kg/m²     UA: clr    Physical Exam  Vitals and nursing note reviewed. Exam conducted with a chaperone present.   Constitutional:       General: She is not in acute distress.     Appearance: She is well-developed. She is not diaphoretic.   HENT:      Head: Normocephalic and atraumatic.      Nose: Nose normal.   Eyes:      Extraocular Movements: Extraocular movements intact.   Cardiovascular:      Rate and Rhythm: Normal rate.   Pulmonary:      Effort: Pulmonary " effort is normal.   Chest:      Breasts: Breasts are symmetrical.         Right: Normal. No mass, nipple discharge, skin change or tenderness.         Left: Normal. No mass, nipple discharge, skin change or tenderness.   Abdominal:      General: There is no distension.      Palpations: Abdomen is soft. There is no mass.      Tenderness: There is no abdominal tenderness. There is no guarding.   Genitourinary:     General: Normal vulva.      Pubic Area: No rash.       Vagina: Normal. No vaginal discharge.      Adnexa: Right adnexa normal and left adnexa normal.      Comments: Cuff wnl.  Pap done.    Musculoskeletal:         General: No tenderness or deformity. Normal range of motion.      Cervical back: Normal range of motion.   Lymphadenopathy:      Upper Body:      Right upper body: No axillary adenopathy.      Left upper body: No axillary adenopathy.   Skin:     General: Skin is warm and dry.      Coloration: Skin is not pale.      Findings: No erythema or rash.   Neurological:      Mental Status: She is alert and oriented to person, place, and time.   Psychiatric:         Behavior: Behavior normal.         Thought Content: Thought content normal.         Judgment: Judgment normal.            As part of wellness and prevention, the following topics were discussed with the patient:  []  Nutrition  []  Physical activity/regular exercise   [x]  Healthy weight  []  Injury prevention  [x]  Substance misuse/abuse  []  Sexual behavior  []  STD prevention  []  Contaception  []  Dental health  [x]  Mental health  []  Immunization  [x]  Encouraged SBE     Counseling and guidance done:  Nutrition, physical activity, healthy weight, injury prevention, misuse of tobacco, alcohol and drugs, sexual behavior and STDs, contraception, dental health, mental health, immunizations breast cancer screening and exams.    Assessment     1) GYN annual well woman exam.   2) PAP done today? Yes  3) problems addressed: as above    MAMMOGRAM UP  TO DATE IF AGE APPROPRIATE?  No    Fhx breast cancer? No    Fhx ovarian cancer? Yes    Fhx colon cancer? No    Invitae testing offered? Yes : information given.           Plan       Follow up prn or one year.    Diagnoses and all orders for this visit:    1. Exposure to STD (Primary)  -     Hepatitis B Surface Antigen  -     Hepatitis C Antibody  -     HIV-1 / O / 2 Ag / Antibody 4th Generation  -     HSV 1 & 2 - Specific Antibody, IgG  -     RPR, Rfx Qn RPR / Confirm TP    2. Routine gynecological examination  -     POC Urinalysis Dipstick  -     IGP,CtNgTv,Apt HPV,rfx 16 / 18,45    3. Pap smear, low-risk  -     IGP,CtNgTv,Apt HPV,rfx 16 / 18,45    4. H/O abdominal hysterectomy-for chronic DUB    5. Mild intermittent asthma without complication    6. Smoker        RTO Return in about 1 year (around 3/30/2022) for Annual physical.    TIME: More than 50% of time spent in counseling and/or coordination of care.Time spent in counseling 30 min.  Counseling included the following topics relationship recover, STD testing with prognosis, differential diagnosis, risks, benefits of treatment, instructions, compliance and/or risk reduction and alternatives.       Joe Hastings MD  [unfilled]  15:20 EDT

## 2021-03-31 PROBLEM — B00.9 POLYMERASE CHAIN REACTION DNA TEST POSITIVE FOR HERPES SIMPLEX VIRUS TYPE 1 (HSV-1): Status: ACTIVE | Noted: 2021-03-31

## 2021-03-31 PROBLEM — Z11.59 POLYMERASE CHAIN REACTION DNA TEST POSITIVE FOR HERPES SIMPLEX VIRUS TYPE 1 (HSV-1): Status: ACTIVE | Noted: 2021-03-31

## 2021-03-31 LAB
HBV SURFACE AG SERPL QL IA: NEGATIVE
HCV AB S/CO SERPL IA: <0.1 S/CO RATIO (ref 0–0.9)
HIV 1+2 AB+HIV1 P24 AG SERPL QL IA: NON REACTIVE
HSV1 IGG SER IA-ACNC: 33.6 INDEX (ref 0–0.9)
HSV2 IGG SER IA-ACNC: <0.91 INDEX (ref 0–0.9)
RPR SER QL: NON REACTIVE

## 2021-04-01 LAB
C TRACH RRNA CVX QL NAA+PROBE: NEGATIVE
CYTOLOGIST CVX/VAG CYTO: NORMAL
CYTOLOGY CVX/VAG DOC CYTO: NORMAL
CYTOLOGY CVX/VAG DOC THIN PREP: NORMAL
DX ICD CODE: NORMAL
HIV 1 & 2 AB SER-IMP: NORMAL
HPV I/H RISK 4 DNA CVX QL PROBE+SIG AMP: NEGATIVE
N GONORRHOEA RRNA CVX QL NAA+PROBE: NEGATIVE
OTHER STN SPEC: NORMAL
STAT OF ADQ CVX/VAG CYTO-IMP: NORMAL
T VAGINALIS RRNA SPEC QL NAA+PROBE: NEGATIVE

## 2021-05-25 ENCOUNTER — TRANSCRIBE ORDERS (OUTPATIENT)
Dept: ADMINISTRATIVE | Facility: HOSPITAL | Age: 41
End: 2021-05-25

## 2021-05-25 ENCOUNTER — HOSPITAL ENCOUNTER (OUTPATIENT)
Dept: GENERAL RADIOLOGY | Facility: HOSPITAL | Age: 41
Discharge: HOME | End: 2021-05-25
Admitting: FAMILY MEDICINE

## 2021-05-25 DIAGNOSIS — M25.512 LEFT SHOULDER PAIN, UNSPECIFIED CHRONICITY: ICD-10-CM

## 2021-05-25 DIAGNOSIS — M25.512 LEFT SHOULDER PAIN, UNSPECIFIED CHRONICITY: Primary | ICD-10-CM

## 2021-05-25 PROCEDURE — 73030 X-RAY EXAM OF SHOULDER: CPT

## 2021-06-08 ENCOUNTER — OFFICE VISIT (OUTPATIENT)
Dept: ORTHOPEDIC SURGERY | Facility: CLINIC | Age: 41
End: 2021-06-08

## 2021-06-08 VITALS
HEART RATE: 65 BPM | DIASTOLIC BLOOD PRESSURE: 73 MMHG | BODY MASS INDEX: 27.78 KG/M2 | HEIGHT: 67 IN | SYSTOLIC BLOOD PRESSURE: 106 MMHG | WEIGHT: 177 LBS

## 2021-06-08 DIAGNOSIS — M25.512 CHRONIC LEFT SHOULDER PAIN: Primary | ICD-10-CM

## 2021-06-08 DIAGNOSIS — G89.29 CHRONIC LEFT SHOULDER PAIN: Primary | ICD-10-CM

## 2021-06-08 PROCEDURE — 99213 OFFICE O/P EST LOW 20 MIN: CPT | Performed by: NURSE PRACTITIONER

## 2021-06-08 RX ORDER — TIZANIDINE 4 MG/1
4 TABLET ORAL 2 TIMES DAILY
COMMUNITY
Start: 2021-05-24

## 2021-06-09 ENCOUNTER — OFFICE VISIT (OUTPATIENT)
Dept: ORTHOPEDIC SURGERY | Facility: CLINIC | Age: 41
End: 2021-06-09

## 2021-06-09 VITALS — HEIGHT: 66 IN | WEIGHT: 177 LBS | BODY MASS INDEX: 28.45 KG/M2

## 2021-06-09 DIAGNOSIS — M54.2 CERVICAL SPINE PAIN: Primary | ICD-10-CM

## 2021-06-09 DIAGNOSIS — M54.12 CERVICAL RADICULOPATHY: ICD-10-CM

## 2021-06-09 PROBLEM — M25.512 CHRONIC LEFT SHOULDER PAIN: Status: ACTIVE | Noted: 2021-06-09

## 2021-06-09 PROBLEM — G89.29 CHRONIC LEFT SHOULDER PAIN: Status: ACTIVE | Noted: 2021-06-09

## 2021-06-09 PROCEDURE — 99214 OFFICE O/P EST MOD 30 MIN: CPT | Performed by: NURSE PRACTITIONER

## 2021-06-09 PROCEDURE — 72040 X-RAY EXAM NECK SPINE 2-3 VW: CPT | Performed by: NURSE PRACTITIONER

## 2021-06-09 RX ORDER — MELOXICAM 15 MG/1
15 TABLET ORAL DAILY
Qty: 30 TABLET | Refills: 2 | Status: SHIPPED | OUTPATIENT
Start: 2021-06-09 | End: 2021-09-05

## 2021-06-09 RX ORDER — PREGABALIN 75 MG/1
75 CAPSULE ORAL 2 TIMES DAILY
Qty: 60 CAPSULE | Refills: 0 | Status: SHIPPED | OUTPATIENT
Start: 2021-06-09 | End: 2021-06-17

## 2021-06-09 NOTE — PROGRESS NOTES
Subjective:     Patient ID: Alexandra Cantrell is a 41 y.o. female.    Chief Complaint:  Left shoulder pain, new patient to examiner  History of Present Illness  Alexandra Cantrell 41-year-old female who presents to clinic for evaluation of her left shoulder.  Began experiencing pain over the last 6 years after she was sleeping on hardwood floor.  Maximal tenderness present the top of her shoulder with pain along with numbness and tingling radiating down the left upper extremity.  She is right-hand dominant.  Rates discomfort 6-7 out of 10 described as sharp, shooting in nature.  Increased pain noted with sleeping, turning her head and shoulder range of motion, minimal symptom relief with rest.  Seen by primary care x-ray images completed at [facility] which are available for viewing in chart.  She is tried and currently taking tizanidine by primary care as well as ibuprofen 800 mg without much significant relief.  Anti-inflammatory medications for symptom relief.  Denies any previous x-ray imaging of her cervical spine denies any prior MRI or CT of her C-spine.  Denies any prior corticosteroid injections into the left shoulder.  Has had MRI of the left shoulder completed 2015 [facility] as ordered by her primary care unremarkable shoulder no evidence of rotator cuff again 2015.  No other concerns present this time.  Social History     Occupational History   • Not on file   Tobacco Use   • Smoking status: Current Every Day Smoker     Packs/day: 1.00     Years: 25.00     Pack years: 25.00     Types: Cigarettes   • Smokeless tobacco: Never Used   Substance and Sexual Activity   • Alcohol use: No   • Drug use: No   • Sexual activity: Defer      Past Medical History:   Diagnosis Date   • Abdominal pain     SCHEDULED FOR SX   • Abscess     LEFT UPPER JAW, TOOTH BROKE OFF   • Anemia    • Arthritis     FINGERS & TOES   • Asthma    • COPD (chronic obstructive pulmonary disease) (CMS/[facility])    • Migraines      Past Surgical History:  "  Procedure Laterality Date   • APPENDECTOMY     •  SECTION      X3   • CHOLECYSTECTOMY      LAP   • DIAGNOSTIC LAPAROSCOPY N/A 10/25/2018    Procedure: DIAGNOSTIC LAPAROSCOPY with lysis of adhesions;  Surgeon: Joe Hastings MD;  Location: [facility] OR;  Service: Obstetrics/Gynecology   • HYSTERECTOMY      OPEN   • TUBAL ABDOMINAL LIGATION         Family History   Problem Relation Age of Onset   • Heart attack Mother    • Diabetes Mother    • Heart disease Father    • Stroke Father    • Lung cancer Father    • Heart attack Sister    • Heart attack Brother    • Diabetes Maternal Grandmother    • Diabetes Paternal Grandmother    • Heart attack Brother    • Heart attack Brother    • Heart attack Sister    • Heart attack Sister    • Ovarian cancer Sister           Objective:  Physical Exam    Vital signs reviewed.   General: No acute distress.  Eyes: conjunctiva clear; pupils equally round and reactive  ENT: external ears and nose atraumatic; oropharynx clear  CV: no peripheral edema  Resp: normal respiratory effort  Skin: no rashes or wounds; normal turgor  Psych: mood and affect appropriate; recent and remote memory intact    Vitals:    21 1103   BP: 106/73   Pulse: 65   Weight: 80.3 kg (177 lb)   Height: 168.9 cm (66.5\")         21  1103   Weight: 80.3 kg (177 lb)     Body mass index is 28.14 kg/m².      Left Shoulder Exam     Tenderness   The patient is experiencing tenderness in the acromion and acromioclavicular joint.    Range of Motion   External rotation: 80   Forward flexion: 180   Internal rotation 0 degrees: T12     Muscle Strength   Internal rotation: 5/5   External rotation: 5/5   Supraspinatus: 5/5   Subscapularis: 5/5   Biceps: 5/5     Tests   Spence test: negative  Cross arm: negative  Impingement: negative  Drop arm: negative    Other   Erythema: absent  Sensation: normal  Pulse: present     Comments:  Negative empty can  negative Alloway's  negative Speed's  negative " bear hug exam  Negative Neer's exam           Imaging:  Left Shoulder X-Ray  Indication: Pain  AP Internal and External Rotation views    Findings:  No fracture  No bony lesion  Normal soft tissues  Normal joint spaces    No prior studies were available for comparison.    Assessment:        1. Chronic left shoulder pain           Plan:  1.  Long discussion with patient regarding treatment options.  Discussed with patient I do believe this is all coming from her shoulder.  She has been working 12 hours overnight has waited to be seen for 4 hours we will release her today plan to see her back in clinic in the morning.  Complete x-ray imaging of her CT spine at follow-up in a.m. and further evaluation to be completed.  She verbalized understanding of all information agrees with plan of care.  Denies other concerns present  Orders:  No orders of the defined types were placed in this encounter.      Medications:  No orders of the defined types were placed in this encounter.      Followup:  No follow-ups on file.    Diagnoses and all orders for this visit:    1. Chronic left shoulder pain (Primary)        I ordered and reviewed the BRADLY today.     Dictated utilizing Dragon dictation

## 2021-06-09 NOTE — PROGRESS NOTES
Subjective:     Patient ID: Alexandra Cantrell is a 41 y.o. female.    Chief Complaint:  Left shoulder pain follow-up, cervical spine pain  History of Present Illness  Alexandra Cantrell returns to clinic today for follow-up.  She her shoulder was examined yesterday today presents for examination of her neck.  She is experiencing radiating down the left upper extremity along with numbness and tingling.  Reports pain radiating across the front of the shoulder as well as numbness and tingling around the axillary and down the upper extremity.  She has completed physical therapy with Th[facility] Song on 2 different occasions for the shoulder without any significant symptom relief.  She was referred for treatment by primary care provider.  She is previously taken pain medications which were prescribed to her without any symptom relief ibuprofen 800 mg with minimal symptom relief and muscle relaxers with mild symptom relief which helped her sleep more than anything.  Denies any prior MRI of cervical spine denies any recent physical therapy for her C-spine.  Denies any prior CT or x-ray imaging.  Denies other concerns present time.     Social History     Occupational History   • Not on file   Tobacco Use   • Smoking status: Current Every Day Smoker     Packs/day: 1.00     Years: 25.00     Pack years: 25.00     Types: Cigarettes   • Smokeless tobacco: Never Used   Substance and Sexual Activity   • Alcohol use: No   • Drug use: No   • Sexual activity: Defer      Past Medical History:   Diagnosis Date   • Abdominal pain     SCHEDULED FOR SX   • Abscess     LEFT UPPER JAW, TOOTH BROKE OFF   • Anemia    • Arthritis     FINGERS & TOES   • Asthma    • COPD (chronic obstructive pulmonary disease) (CMS/HCC)    • Migraines      Past Surgical History:   Procedure Laterality Date   • APPENDECTOMY     •  SECTION      X3   • CHOLECYSTECTOMY      LAP   • DIAGNOSTIC LAPAROSCOPY N/A 10/25/2018    Procedure: DIAGNOSTIC LAPAROSCOPY with  "lysis of adhesions;  Surgeon: Joe Hastings MD;  Location: [facility];  Service: Obstetrics/Gynecology   • HYSTERECTOMY      OPEN   • TUBAL ABDOMINAL LIGATION         Family History   Problem Relation Age of Onset   • Heart attack Mother    • Diabetes Mother    • Heart disease Father    • Stroke Father    • Lung cancer Father    • Heart attack Sister    • Heart attack Brother    • Diabetes Maternal Grandmother    • Diabetes Paternal Grandmother    • Heart attack Brother    • Heart attack Brother    • Heart attack Sister    • Heart attack Sister    • Ovarian cancer Sister          Objective:  Physical Exam    Vital signs reviewed.   General: No acute distress.  Eyes: conjunctiva clear; pupils equally round and reactive  ENT: external ears and nose atraumatic; oropharynx clear  CV: no peripheral edema  Resp: normal respiratory effort  Skin: no rashes or wounds; normal turgor  Psych: mood and affect appropriate; recent and remote memory intact    Vitals:    06/09/21 0750   Weight: 80.3 kg (177 lb)   Height: 168.9 cm (66.5\")         06/09/21  0750   Weight: 80.3 kg (177 lb)     Body mass index is 28.14 kg/m².      Back Exam     Tenderness   The patient is experiencing tenderness in the cervical.    Range of Motion   Lateral bend left: abnormal   Rotation left: abnormal     Other   Sensation: normal    Comments:  Positive Spurling's exam                 Imaging:  Cervical Spine X-Ray    Indication: Pain  Views: AP and Lateral    Findings:  No fracture  No bony lesions  Soft tissues normal  Degenerative changes noted C6-C7  No prior studies are available for comparison.    Assessment:        1. Cervical spine pain    2. Cervical radiculopathy           Plan:  1.  Discussed plan of care with patient.  Discussed we will proceed with physical therapy prefers PT plus an eminence for her neck.   Discussed treatment options including Lyrica which is for treatment of nerve pain which I do recommend twice daily for " symptom relief.  Discussed with patient she will begin noticing drowsiness when she starts the initial dose therefore take when she is going to bed however her body will adjust to it and then she can take twice daily.  We will discontinue ibuprofen start meloxicam 15 mg 1 tablet once daily.  She has taken meloxicam in the past believe she is tolerated well.  We will plan to see her back in clinic in 4 weeks to reevaluate.  If not improving will proceed with MRI of the cervical spine.  Patient verbalized understanding of all information agrees with plan of care.  Denies other concerns present time.    Orders Placed This Encounter   Procedures   • XR Spine Cervical 2 View   • Ambulatory Referral to Physical Therapy       Medications:  New Medications Ordered This Visit   Medications   • pregabalin (Lyrica) 75 MG capsule     Sig: Take 1 capsule by mouth 2 (Two) Times a Day.     Dispense:  60 capsule     Refill:  0   • meloxicam (MOBIC) 15 MG tablet     Sig: Take 1 tablet by mouth Daily.     Dispense:  30 tablet     Refill:  2       Followup:  No follow-ups on file.    Diagnoses and all orders for this visit:    1. Cervical spine pain (Primary)  -     XR Spine Cervical 2 View  -     Ambulatory Referral to Physical Therapy    2. Cervical radiculopathy  -     pregabalin (Lyrica) 75 MG capsule; Take 1 capsule by mouth 2 (Two) Times a Day.  Dispense: 60 capsule; Refill: 0  -     Ambulatory Referral to Physical Therapy    Other orders  -     meloxicam (MOBIC) 15 MG tablet; Take 1 tablet by mouth Daily.  Dispense: 30 tablet; Refill: 2        Dictated utilizing Dragon dictation

## 2021-06-10 ENCOUNTER — TELEPHONE (OUTPATIENT)
Dept: ORTHOPEDIC SURGERY | Facility: CLINIC | Age: 41
End: 2021-06-10

## 2021-06-10 DIAGNOSIS — M54.12 CERVICAL RADICULOPATHY: ICD-10-CM

## 2021-06-10 DIAGNOSIS — M54.2 CERVICAL SPINE PAIN: Primary | ICD-10-CM

## 2021-06-10 RX ORDER — GABAPENTIN 100 MG/1
200 CAPSULE ORAL
Qty: 14 CAPSULE | Refills: 0 | Status: SHIPPED | OUTPATIENT
Start: 2021-06-10 | End: 2021-06-17

## 2021-06-10 NOTE — TELEPHONE ENCOUNTER
Provider: TRACEE RAYA  Caller: DEBBIE MADSEN  Relationship to Patient: SELF     Phone Number: [phone]  Reason for Call: SHOULDER PAIN  When was the patient last seen: 6-9-21  When did it start: 6-10-21 / 1:00AM  Where is it located: LEFT SHOULDER     Timing- Is it constant or intermittent: CONSTANT  What makes it worse: MOVING IT  What makes it better: ELBOW BENT IN FRONT   What therapies/medications have you tried: IBUPROFEN, AND IT IS NOT HELPING    PATIENT STATED SHE IS ALLERGIC TO ASPIRIN, SHE SAID THAT HER PAPERWORK FROM PHARMACY MENTIONED THAT IF ALLERGIC TO ASPIRIN, NOT TO TAKE THE MELOXICAM.    PATIENT WOULD LIKE A CALL BACK TODAY. SHE LEAVES AT 4:15 FOR WORK.

## 2021-06-14 ENCOUNTER — TELEPHONE (OUTPATIENT)
Dept: ORTHOPEDIC SURGERY | Facility: CLINIC | Age: 41
End: 2021-06-14

## 2021-06-14 NOTE — TELEPHONE ENCOUNTER
Provider: TRACEE RAYA    Caller: DEBBIE MADSEN    Relationship to Patient: SELF    Pharmacy: [facility] [place]    Phone Number: [phone]    Reason for Call: PATIENT STATES THAT gabapentin (NEURONTIN) 100 MG capsule    HAS REALLY BAD SIDE EFFECTS FOR HER. EVERYTHING MADE HER MAD AND AGGRAVATED AND SHE WAS UNABLE TO EAT WHILE TAKING THE MEDICINE    When was the patient last seen: 6/9/21    When did it start: Thursday 6/10/21

## 2021-06-17 DIAGNOSIS — M54.2 CERVICAL SPINE PAIN: Primary | ICD-10-CM

## 2021-06-17 DIAGNOSIS — M54.12 CERVICAL RADICULOPATHY: ICD-10-CM

## 2021-06-17 RX ORDER — PREGABALIN 75 MG/1
75 CAPSULE ORAL 2 TIMES DAILY
Qty: 60 CAPSULE | Refills: 0 | Status: SHIPPED | OUTPATIENT
Start: 2021-06-17

## 2021-07-07 ENCOUNTER — OFFICE VISIT (OUTPATIENT)
Dept: ORTHOPEDIC SURGERY | Facility: CLINIC | Age: 41
End: 2021-07-07

## 2021-07-07 VITALS — HEIGHT: 67 IN | BODY MASS INDEX: 27.78 KG/M2 | WEIGHT: 177 LBS

## 2021-07-07 DIAGNOSIS — M54.12 CERVICAL RADICULOPATHY: Primary | ICD-10-CM

## 2021-07-07 PROCEDURE — 99214 OFFICE O/P EST MOD 30 MIN: CPT | Performed by: NURSE PRACTITIONER

## 2021-07-07 NOTE — PROGRESS NOTES
Subjective:     Patient ID: Alexandra Cantrell is a 41 y.o. female.    Chief Complaint:  Cervical radiculopathy  History of Present Illness  Alexandra Cantrell returns to clinic for follow-up of her neck and shoulder pain.  She was started on Lyrica which did significantly help reduce the numbness and tingling is also completed physical therapy again with symptom relief.  She does continue to experience pain with shoulder abduction and attempting to reach behind her back experiencing radicular symptoms numbness and tingling as well as pain shooting down from the left side of her neck down to the dorsum and palmar aspect of the hand with certain motions and with sleeping on the left shoulder at night.  Again has done extremely well with physical therapy however symptoms still remain present.  Denies any prior CT or MRI of the cervical spine.  Denies other concerns present time.    Social History     Occupational History   • Not on file   Tobacco Use   • Smoking status: Current Every Day Smoker     Packs/day: 1.00     Years: 25.00     Pack years: 25.00     Types: Cigarettes   • Smokeless tobacco: Never Used   Substance and Sexual Activity   • Alcohol use: No   • Drug use: No   • Sexual activity: Defer      Past Medical History:   Diagnosis Date   • Abdominal pain     SCHEDULED FOR SX   • Abscess     LEFT UPPER JAW, TOOTH BROKE OFF   • Anemia    • Arthritis     FINGERS & TOES   • Asthma    • COPD (chronic obstructive pulmonary disease) (CMS/HCC)    • Migraines      Past Surgical History:   Procedure Laterality Date   • APPENDECTOMY     •  SECTION      X3   • CHOLECYSTECTOMY      LAP   • DIAGNOSTIC LAPAROSCOPY N/A 10/25/2018    Procedure: DIAGNOSTIC LAPAROSCOPY with lysis of adhesions;  Surgeon: Joe Hastings MD;  Location: [facility];  Service: Obstetrics/Gynecology   • HYSTERECTOMY      OPEN   • TUBAL ABDOMINAL LIGATION         Family History   Problem Relation Age of Onset   • Heart attack  "Mother    • Diabetes Mother    • Heart disease Father    • Stroke Father    • Lung cancer Father    • Heart attack Sister    • Heart attack Brother    • Diabetes Maternal Grandmother    • Diabetes Paternal Grandmother    • Heart attack Brother    • Heart attack Brother    • Heart attack Sister    • Heart attack Sister    • Ovarian cancer Sister          Objective:  Physical Exam  General: No acute distress.  Eyes: conjunctiva clear; pupils equally round and reactive  ENT: external ears and nose atraumatic; oropharynx clear  CV: no peripheral edema  Resp: normal respiratory effort  Skin: no rashes or wounds; normal turgor  Psych: mood and affect appropriate; recent and remote memory intact    Vitals:    07/07/21 0817   Weight: 80.3 kg (177 lb)   Height: 168.9 cm (66.5\")         07/07/21 0817   Weight: 80.3 kg (177 lb)     Body mass index is 28.14 kg/m².      Back Exam     Tenderness   The patient is experiencing tenderness in the cervical.    Range of Motion   Lateral bend left: abnormal   Rotation left: abnormal     Comments:  Positive Spurling's exam with radicular symptoms of the left upper extremity      Left Shoulder Exam     Range of Motion   External rotation: 70   Forward flexion: 180   Internal rotation 0 degrees: T12           Assessment:        1. Cervical radiculopathy           Plan:  1.  Discussed plan of care with patient.  Discussed treatment options including MRI which she does wish to proceed with.  She has 1 more physical therapy follow-up I do recommend she complete physical therapy we will plan to see her back in clinic after completion of the MRI to discuss results and further plan of care.  Patient verbalized understanding of all information agrees with plan of care.  Denies other concerns present this time.  Orders:  No orders of the defined types were placed in this encounter.      Medications:  No orders of the defined types were placed in this encounter.      Followup:  No follow-ups on " file.    Diagnoses and all orders for this visit:    1. Cervical radiculopathy (Primary)          Dictated utilizing Dragon dictation

## 2021-08-31 ENCOUNTER — TRANSCRIBE ORDERS (OUTPATIENT)
Dept: ADMINISTRATIVE | Facility: HOSPITAL | Age: 41
End: 2021-08-31

## 2021-08-31 ENCOUNTER — HOSPITAL ENCOUNTER (OUTPATIENT)
Dept: GENERAL RADIOLOGY | Facility: HOSPITAL | Age: 41
Discharge: HOME | End: 2021-08-31
Admitting: FAMILY MEDICINE

## 2021-08-31 DIAGNOSIS — M54.9 BACK PAIN, UNSPECIFIED BACK LOCATION, UNSPECIFIED BACK PAIN LATERALITY, UNSPECIFIED CHRONICITY: Primary | ICD-10-CM

## 2021-08-31 DIAGNOSIS — M54.9 BACK PAIN, UNSPECIFIED BACK LOCATION, UNSPECIFIED BACK PAIN LATERALITY, UNSPECIFIED CHRONICITY: ICD-10-CM

## 2021-08-31 PROCEDURE — 72100 X-RAY EXAM L-S SPINE 2/3 VWS: CPT

## 2021-09-05 ENCOUNTER — HOSPITAL ENCOUNTER (EMERGENCY)
Facility: HOSPITAL | Age: 41
Discharge: HOME | End: 2021-09-05
Attending: EMERGENCY MEDICINE | Admitting: EMERGENCY MEDICINE

## 2021-09-05 ENCOUNTER — APPOINTMENT (OUTPATIENT)
Dept: GENERAL RADIOLOGY | Facility: HOSPITAL | Age: 41
End: 2021-09-05

## 2021-09-05 VITALS
DIASTOLIC BLOOD PRESSURE: 79 MMHG | OXYGEN SATURATION: 94 % | RESPIRATION RATE: 18 BRPM | SYSTOLIC BLOOD PRESSURE: 123 MMHG | TEMPERATURE: 98.7 F | HEIGHT: 66 IN | BODY MASS INDEX: 27.32 KG/M2 | WEIGHT: 170 LBS | HEART RATE: 81 BPM

## 2021-09-05 DIAGNOSIS — R73.9 HYPERGLYCEMIA: ICD-10-CM

## 2021-09-05 DIAGNOSIS — J45.901 EXACERBATION OF ASTHMA, UNSPECIFIED ASTHMA SEVERITY, UNSPECIFIED WHETHER PERSISTENT: Primary | ICD-10-CM

## 2021-09-05 DIAGNOSIS — Z20.822 LAB TEST NEGATIVE FOR COVID-19 VIRUS: ICD-10-CM

## 2021-09-05 DIAGNOSIS — R05.9 COUGH: ICD-10-CM

## 2021-09-05 LAB
ANION GAP SERPL CALCULATED.3IONS-SCNC: 8.6 MMOL/L (ref 5–15)
BASOPHILS # BLD AUTO: 0.02 10*3/MM3 (ref 0–0.2)
BASOPHILS NFR BLD AUTO: 0.2 % (ref 0–1.5)
BUN SERPL-MCNC: 8 MG/DL (ref 6–20)
BUN/CREAT SERPL: 8.5 (ref 7–25)
CALCIUM SPEC-SCNC: 8.3 MG/DL (ref 8.6–10.5)
CHLORIDE SERPL-SCNC: 102 MMOL/L (ref 98–107)
CO2 SERPL-SCNC: 25.4 MMOL/L (ref 22–29)
CREAT SERPL-MCNC: 0.94 MG/DL (ref 0.57–1)
DEPRECATED RDW RBC AUTO: 51.2 FL (ref 37–54)
EOSINOPHIL # BLD AUTO: 0.07 10*3/MM3 (ref 0–0.4)
EOSINOPHIL NFR BLD AUTO: 0.8 % (ref 0.3–6.2)
ERYTHROCYTE [DISTWIDTH] IN BLOOD BY AUTOMATED COUNT: 15.4 % (ref 12.3–15.4)
FLUAV RNA RESP QL NAA+PROBE: NOT DETECTED
FLUBV RNA RESP QL NAA+PROBE: NOT DETECTED
GFR SERPL CREATININE-BSD FRML MDRD: 65 ML/MIN/1.73
GLUCOSE SERPL-MCNC: 191 MG/DL (ref 65–99)
HCT VFR BLD AUTO: 42.1 % (ref 34–46.6)
HGB BLD-MCNC: 13.7 G/DL (ref 12–15.9)
IMM GRANULOCYTES # BLD AUTO: 0.02 10*3/MM3 (ref 0–0.05)
IMM GRANULOCYTES NFR BLD AUTO: 0.2 % (ref 0–0.5)
LYMPHOCYTES # BLD AUTO: 0.92 10*3/MM3 (ref 0.7–3.1)
LYMPHOCYTES NFR BLD AUTO: 11 % (ref 19.6–45.3)
MCH RBC QN AUTO: 29.3 PG (ref 26.6–33)
MCHC RBC AUTO-ENTMCNC: 32.5 G/DL (ref 31.5–35.7)
MCV RBC AUTO: 90 FL (ref 79–97)
MONOCYTES # BLD AUTO: 0.58 10*3/MM3 (ref 0.1–0.9)
MONOCYTES NFR BLD AUTO: 6.9 % (ref 5–12)
NEUTROPHILS NFR BLD AUTO: 6.78 10*3/MM3 (ref 1.7–7)
NEUTROPHILS NFR BLD AUTO: 80.9 % (ref 42.7–76)
PLATELET # BLD AUTO: 204 10*3/MM3 (ref 140–450)
PMV BLD AUTO: 10.4 FL (ref 6–12)
POTASSIUM SERPL-SCNC: 3.8 MMOL/L (ref 3.5–5.2)
RBC # BLD AUTO: 4.68 10*6/MM3 (ref 3.77–5.28)
SARS-COV-2 RNA RESP QL NAA+PROBE: NOT DETECTED
SODIUM SERPL-SCNC: 136 MMOL/L (ref 136–145)
WBC # BLD AUTO: 8.39 10*3/MM3 (ref 3.4–10.8)

## 2021-09-05 PROCEDURE — 71045 X-RAY EXAM CHEST 1 VIEW: CPT

## 2021-09-05 PROCEDURE — 99283 EMERGENCY DEPT VISIT LOW MDM: CPT

## 2021-09-05 PROCEDURE — 87636 SARSCOV2 & INF A&B AMP PRB: CPT | Performed by: EMERGENCY MEDICINE

## 2021-09-05 PROCEDURE — 99283 EMERGENCY DEPT VISIT LOW MDM: CPT | Performed by: EMERGENCY MEDICINE

## 2021-09-05 PROCEDURE — 80048 BASIC METABOLIC PNL TOTAL CA: CPT | Performed by: EMERGENCY MEDICINE

## 2021-09-05 PROCEDURE — 85025 COMPLETE CBC W/AUTO DIFF WBC: CPT | Performed by: EMERGENCY MEDICINE

## 2021-09-05 RX ORDER — PREDNISONE 20 MG/1
40 TABLET ORAL DAILY
Qty: 10 TABLET | Refills: 0 | Status: SHIPPED | OUTPATIENT
Start: 2021-09-05 | End: 2021-09-10

## 2021-09-05 RX ORDER — FOLIC ACID 1 MG/1
1 TABLET ORAL DAILY
COMMUNITY
End: 2022-04-08

## 2021-09-05 RX ORDER — BENZONATATE 200 MG/1
200 CAPSULE ORAL 3 TIMES DAILY PRN
Qty: 20 CAPSULE | Refills: 0 | Status: SHIPPED | OUTPATIENT
Start: 2021-09-05 | End: 2022-04-05

## 2021-09-05 RX ORDER — ONDANSETRON 4 MG/1
4 TABLET, FILM COATED ORAL EVERY 8 HOURS PRN
COMMUNITY
End: 2022-01-23

## 2021-09-05 RX ORDER — METHYLPREDNISOLONE SODIUM SUCCINATE 125 MG/2ML
125 INJECTION, POWDER, LYOPHILIZED, FOR SOLUTION INTRAMUSCULAR; INTRAVENOUS ONCE
Status: DISCONTINUED | OUTPATIENT
Start: 2021-09-05 | End: 2021-09-05 | Stop reason: HOSPADM

## 2021-09-05 RX ORDER — METHYLPREDNISOLONE SODIUM SUCCINATE 125 MG/2ML
INJECTION, POWDER, LYOPHILIZED, FOR SOLUTION INTRAMUSCULAR; INTRAVENOUS
Status: DISCONTINUED
Start: 2021-09-05 | End: 2021-09-05 | Stop reason: HOSPADM

## 2021-09-05 RX ORDER — SODIUM CHLORIDE 0.9 % (FLUSH) 0.9 %
10 SYRINGE (ML) INJECTION AS NEEDED
Status: DISCONTINUED | OUTPATIENT
Start: 2021-09-05 | End: 2021-09-05 | Stop reason: HOSPADM

## 2021-09-05 NOTE — DISCHARGE INSTRUCTIONS
Your chest x-ray does not show any evidence of pneumonia.  I suspect your coughing may be related to allergies or asthma.  Please get prescriptions filled and take steroids as prescribed.  You may also take the Tessalon in addition.  Please follow-up with your primary care physician for repeat blood testing related to having high blood sugar.  Return to the emergency room if you develop chest pain, abdominal pain or for any other concerns.

## 2021-09-05 NOTE — ED PROVIDER NOTES
Subjective   History of Present Illness  41-year-old female with a history of COPD and asthma presents to the emergency room complaining of having a cough and concerned she might have pneumonia.  She denies any fevers but says she has some body aches and just feels awful.  Has been using her inhaler recently and does not feel especially short of breath.  She says the main reason she is here is she is frequently had pneumonia in the past that starts out like this.  No chest pain no abdominal pain no nausea no vomiting no fevers.  Review of Systems   Constitutional: Positive for fatigue. Negative for fever.   HENT: Positive for congestion.    Respiratory: Positive for cough. Negative for chest tightness and shortness of breath.    Cardiovascular: Negative for chest pain and leg swelling.   Genitourinary: Negative for difficulty urinating and dysuria.   Neurological: Negative for dizziness and weakness.       Past Medical History:   Diagnosis Date   • Abdominal pain     SCHEDULED FOR SX   • Abscess     LEFT UPPER JAW, TOOTH BROKE OFF   • Anemia    • Arthritis     FINGERS & TOES   • Asthma    • COPD (chronic obstructive pulmonary disease) (CMS/[facility])    • Migraines        Allergies   Allergen Reactions   • Aspirin Anaphylaxis   • Bactrim [Sulfamethoxazole-Trimethoprim] Anaphylaxis   • Gabapentin Other (See Comments)     Behavioral changes, aggressive behavior UNABLE TO EAT WHILE TAKING THE MEDICINE       Past Surgical History:   Procedure Laterality Date   • APPENDECTOMY     •  SECTION      X3   • CHOLECYSTECTOMY      LAP   • DIAGNOSTIC LAPAROSCOPY N/A 10/25/2018    Procedure: DIAGNOSTIC LAPAROSCOPY with lysis of adhesions;  Surgeon: Joe Hastings MD;  Location: [facility];  Service: Obstetrics/Gynecology   • HYSTERECTOMY      OPEN   • TUBAL ABDOMINAL LIGATION         Family History   Problem Relation Age of Onset   • Heart attack Mother    • Diabetes Mother    • Heart disease Father    • Stroke  Father    • Lung cancer Father    • Heart attack Sister    • Heart attack Brother    • Diabetes Maternal Grandmother    • Diabetes Paternal Grandmother    • Heart attack Brother    • Heart attack Brother    • Heart attack Sister    • Heart attack Sister    • Ovarian cancer Sister        Social History     Socioeconomic History   • Marital status:      Spouse name: Not on file   • Number of children: Not on file   • Years of education: Not on file   • Highest education level: Not on file   Tobacco Use   • Smoking status: Current Every Day Smoker     Packs/day: 1.00     Years: 25.00     Pack years: 25.00     Types: Cigarettes   • Smokeless tobacco: Never Used   Substance and Sexual Activity   • Alcohol use: No   • Drug use: No   • Sexual activity: Defer           Objective    ED Triage Vitals [09/05/21 1509]   Temp Heart Rate Resp BP SpO2   98.7 °F (37.1 °C) 81 18 123/79 94 %      Temp src Heart Rate Source Patient Position BP Location FiO2 (%)   Oral Monitor Sitting Right arm --       Physical Exam  INITIAL VITAL SIGNS: Reviewed by me.  Pulse ox normal  GENERAL: Alert and interactive. No acute distress.  HEAD: Head is normocephalic.  EYES: EOMI. PERRL. No scleral icterus. No conjunctival injection.  ENT: Moist mucous membranes.   NECK: Supple. Full range of motion.  RESPIRATORY: No tachypnea. Clear breath sounds bilaterally. No wheezing. No rales. No rhonchi.  CV: Regular rate and rhythm. No murmurs. No rubs or gallops.  BACK:  No obvious deformity.  EXTREMITIES: No deformity. No clubbing or cyanosis. No edema.   SKIN: Warm and dry. No diaphoresis. No obvious rashes.   NEUROLOGIC: Alert and oriented. Face is symmetric. Speech is normal.   Results for orders placed or performed during the hospital encounter of 09/05/21   COVID-19 and FLU A/B PCR - Swab, Nasopharynx    Specimen: Nasopharynx; Swab   Result Value Ref Range    COVID19 Not Detected Not Detected - Ref. Range    Influenza A PCR Not Detected Not  Detected    Influenza B PCR Not Detected Not Detected   Basic Metabolic Panel    Specimen: Blood   Result Value Ref Range    Glucose 191 (H) 65 - 99 mg/dL    BUN 8 6 - 20 mg/dL    Creatinine 0.94 0.57 - 1.00 mg/dL    Sodium 136 136 - 145 mmol/L    Potassium 3.8 3.5 - 5.2 mmol/L    Chloride 102 98 - 107 mmol/L    CO2 25.4 22.0 - 29.0 mmol/L    Calcium 8.3 (L) 8.6 - 10.5 mg/dL    eGFR Non African Amer 65 >60 mL/min/1.73    BUN/Creatinine Ratio 8.5 7.0 - 25.0    Anion Gap 8.6 5.0 - 15.0 mmol/L   CBC Auto Differential    Specimen: Blood   Result Value Ref Range    WBC 8.39 3.40 - 10.80 10*3/mm3    RBC 4.68 3.77 - 5.28 10*6/mm3    Hemoglobin 13.7 12.0 - 15.9 g/dL    Hematocrit 42.1 34.0 - 46.6 %    MCV 90.0 79.0 - 97.0 fL    MCH 29.3 26.6 - 33.0 pg    MCHC 32.5 31.5 - 35.7 g/dL    RDW 15.4 12.3 - 15.4 %    RDW-SD 51.2 37.0 - 54.0 fl    MPV 10.4 6.0 - 12.0 fL    Platelets 204 140 - 450 10*3/mm3    Neutrophil % 80.9 (H) 42.7 - 76.0 %    Lymphocyte % 11.0 (L) 19.6 - 45.3 %    Monocyte % 6.9 5.0 - 12.0 %    Eosinophil % 0.8 0.3 - 6.2 %    Basophil % 0.2 0.0 - 1.5 %    Immature Grans % 0.2 0.0 - 0.5 %    Neutrophils, Absolute 6.78 1.70 - 7.00 10*3/mm3    Lymphocytes, Absolute 0.92 0.70 - 3.10 10*3/mm3    Monocytes, Absolute 0.58 0.10 - 0.90 10*3/mm3    Eosinophils, Absolute 0.07 0.00 - 0.40 10*3/mm3    Basophils, Absolute 0.02 0.00 - 0.20 10*3/mm3    Immature Grans, Absolute 0.02 0.00 - 0.05 10*3/mm3     XR Chest 1 View    Result Date: 9/5/2021  CR Chest 1 Vw INDICATION: Asthma COMPARISON:  None available. FINDINGS: Portable AP view(s) of the chest.  The heart and mediastinal contours are normal. The lungs are clear apart from prominence of the bronchovascular markings which is thought to be likely chronic. No pneumothorax or pleural effusion.     No acute cardiopulmonary findings. Signer Name: Gely Abraham MD  Signed: 9/5/2021 4:11 PM  Workstation Name: [id number]  [facility]            ED Course  ED Course as of Sep 05 1629   Sun Sep 05, 2021   1625 Patient here concerned about a cough being related to pneumonia.  Says she has been using her inhaler more frequently recently which does lead to some relief.  On exam she is afebrile has clear lung sounds appears in no distress.  Labs show a glucose of 191, CBC shows no elevation white blood cell count.  Covid and flu are negative.  Suspect she is having some asthma exacerbation.    [RO]      ED Course User Index  [RO] Phuc Herrera MD                                           [facility]    Final diagnoses:   Exacerbation of asthma, unspecified asthma severity, unspecified whether persistent   Cough   Lab test negative for COVID-19 virus   Hyperglycemia       ED Disposition  ED Disposition     ED Disposition Condition Comment    Discharge Tahmina Oneil MD  [address] [id number] [phone]    Call   To schedule follow-up appointment for repeat lab testing         Medication List      New Prescriptions    benzonatate 200 MG capsule  Commonly known as: TESSALON  Take 1 capsule by mouth 3 (Three) Times a Day As Needed for Cough.     predniSONE 20 MG tablet  Commonly known as: DELTASONE  Take 2 tablets by mouth Daily for 5 days.           Where to Get Your Medications      These medications were sent to [facility] [address] [facility] - [phone]  - [phone] [address] [address]    Phone: [phone]   · benzonatate 200 MG capsule  · predniSONE 20 MG tablet          Phuc Herrera MD  09/05/21 4072

## 2021-10-07 ENCOUNTER — TELEPHONE (OUTPATIENT)
Dept: ORTHOPEDIC SURGERY | Facility: CLINIC | Age: 41
End: 2021-10-07

## 2021-10-07 NOTE — TELEPHONE ENCOUNTER
Caller: DEBBIE MADSEN    Relationship to patient: SELF    Best call back number: DEBBIE MADSEN    Chief complaint: Cervical radiculopathy     Type of visit: ELAVUALTION AND RELEASE FOR WORK AT [email or web address]    Requested date: 10/12/21    If rescheduling, when is the original appointment:      Additional notes: THE PATIENT IS IN THE HIRING PROCESS WITH  AND NEEDS A MEDICAL RELEASE IN ORDER TO BE HIRED BECAUSE OF HER Cervical radiculopathy.    THE PATIENT HAS BEEN SEEING TRACEE RAYA FOR Cervical radiculopathy    I DO NOT SEE THAT DX IN THE REF TOOL. CAN THE PATIENT BE EVALUATED BY DR ZAMAN OR DR VAZQUEZ?    THE PATIENT STATED SHE WOULD HAVE  FAX OVER A LIST OF WHAT NEEDS TO BE EVALUATED AND SIGNED OFF ON.

## 2021-10-07 NOTE — TELEPHONE ENCOUNTER
LM THAT TRACEE RECOMMENDS THAT SHE SEE HER PCP FOR ANY PAPERWORK THAT NEEDS FILLED OUT FOR HER NEW JOB SINCE SHE HASN'T BEEN SEEN IN OUR OFFICE SINCE July.     PER SEC- She is released from my care. She has never followed up. I would give her a letter stating that she is not currently under my care. She can follow-up with PCP for the other. I am not going to complete any paperwork other than a note because she has not been seen in three months and failed to follow through with recommendations.

## 2022-01-22 ENCOUNTER — HOSPITAL ENCOUNTER (EMERGENCY)
Facility: HOSPITAL | Age: 42
Discharge: HOME | End: 2022-01-23
Attending: EMERGENCY MEDICINE | Admitting: EMERGENCY MEDICINE

## 2022-01-22 DIAGNOSIS — K04.6: Primary | ICD-10-CM

## 2022-01-22 PROCEDURE — 99283 EMERGENCY DEPT VISIT LOW MDM: CPT

## 2022-01-23 VITALS
OXYGEN SATURATION: 96 % | TEMPERATURE: 98.1 F | RESPIRATION RATE: 15 BRPM | HEIGHT: 67 IN | HEART RATE: 61 BPM | DIASTOLIC BLOOD PRESSURE: 70 MMHG | SYSTOLIC BLOOD PRESSURE: 128 MMHG | WEIGHT: 182.1 LBS | BODY MASS INDEX: 28.58 KG/M2

## 2022-01-23 RX ORDER — HYDROCODONE BITARTRATE AND ACETAMINOPHEN 5; 325 MG/1; MG/1
1 TABLET ORAL EVERY 6 HOURS PRN
Qty: 12 TABLET | Refills: 0 | Status: SHIPPED | OUTPATIENT
Start: 2022-01-23 | End: 2022-04-08

## 2022-01-23 RX ORDER — HYDROCODONE BITARTRATE AND ACETAMINOPHEN 5; 325 MG/1; MG/1
1 TABLET ORAL EVERY 6 HOURS PRN
Status: DISCONTINUED | OUTPATIENT
Start: 2022-01-23 | End: 2022-01-23 | Stop reason: HOSPADM

## 2022-01-23 RX ORDER — PENICILLIN V POTASSIUM 250 MG/1
500 TABLET ORAL ONCE
Status: COMPLETED | OUTPATIENT
Start: 2022-01-23 | End: 2022-01-23

## 2022-01-23 RX ORDER — PENICILLIN V POTASSIUM 500 MG/1
500 TABLET ORAL 3 TIMES DAILY
Qty: 21 TABLET | Refills: 0 | Status: SHIPPED | OUTPATIENT
Start: 2022-01-23 | End: 2022-04-08

## 2022-01-23 RX ORDER — ONDANSETRON 4 MG/1
4 TABLET, ORALLY DISINTEGRATING ORAL 4 TIMES DAILY PRN
Qty: 15 TABLET | Refills: 0 | OUTPATIENT
Start: 2022-01-23 | End: 2022-03-06

## 2022-01-23 RX ADMIN — HYDROCODONE BITARTRATE AND ACETAMINOPHEN 1 TABLET: 5; 325 TABLET ORAL at 01:17

## 2022-01-23 RX ADMIN — PENICILLIN V POTASSIUM 500 MG: 250 TABLET, FILM COATED ORAL at 01:17

## 2022-01-23 NOTE — ED PROVIDER NOTES
Subjective   The patient presents to the emergency department complaining of dental abscess to her left lower jaw. She states that she started having increased pain and some mild swelling to her lower left jaw. Patient has extensive dental decay then onto the gum on all 4 of her left side molars. She reports no fevers. She states she has had some nausea but no vomiting. She denies any vomiting. She has no facial swelling but does have tenderness with palpation to the lower jaw. She does have redness along the gumline but no palpable abscess that can be drained at this time. She states she has had no foul taste or drainage in her mouth over the last 3 days.          Review of Systems   Constitutional: Positive for fatigue. Negative for chills and fever.   HENT: Positive for dental problem. Negative for congestion, ear pain and sore throat.    Eyes: Negative for pain.   Respiratory: Negative for cough, chest tightness and shortness of breath.    Cardiovascular: Negative for chest pain.   Gastrointestinal: Positive for nausea. Negative for abdominal pain, diarrhea and vomiting.   Genitourinary: Negative for flank pain and hematuria.   Musculoskeletal: Negative for joint swelling.   Skin: Negative for pallor.   Neurological: Negative for seizures and headaches.   All other systems reviewed and are negative.      Past Medical History:   Diagnosis Date   • Abdominal pain     SCHEDULED FOR SX   • Abscess     LEFT UPPER JAW, TOOTH BROKE OFF   • Anemia    • Arthritis     FINGERS & TOES   • Asthma    • COPD (chronic obstructive pulmonary disease) ([facility])    • Migraines        Allergies   Allergen Reactions   • Aspirin Anaphylaxis   • Bactrim [Sulfamethoxazole-Trimethoprim] Anaphylaxis   • Gabapentin Other (See Comments)     Behavioral changes, aggressive behavior UNABLE TO EAT WHILE TAKING THE MEDICINE       Past Surgical History:   Procedure Laterality Date   • APPENDECTOMY     •  SECTION      X3   • CHOLECYSTECTOMY       LAP   • DIAGNOSTIC LAPAROSCOPY N/A 10/25/2018    Procedure: DIAGNOSTIC LAPAROSCOPY with lysis of adhesions;  Surgeon: Joe Hastings MD;  Location: [facility];  Service: Obstetrics/Gynecology   • HYSTERECTOMY      OPEN   • TUBAL ABDOMINAL LIGATION         Family History   Problem Relation Age of Onset   • Heart attack Mother    • Diabetes Mother    • Heart disease Father    • Stroke Father    • Lung cancer Father    • Heart attack Sister    • Heart attack Brother    • Diabetes Maternal Grandmother    • Diabetes Paternal Grandmother    • Heart attack Brother    • Heart attack Brother    • Heart attack Sister    • Heart attack Sister    • Ovarian cancer Sister        Social History     Socioeconomic History   • Marital status:    Tobacco Use   • Smoking status: Current Every Day Smoker     Packs/day: 1.00     Years: 25.00     Pack years: 25.00     Types: Cigarettes   • Smokeless tobacco: Never Used   Substance and Sexual Activity   • Alcohol use: No   • Drug use: No   • Sexual activity: Defer           Objective   Physical Exam  Vitals and nursing note reviewed.   Constitutional:       General: She is not in acute distress.     Appearance: Normal appearance. She is not ill-appearing or toxic-appearing.   HENT:      Head: Normocephalic and atraumatic.      Mouth/Throat:      Mouth: Mucous membranes are moist.      Pharynx: Oropharynx is clear.   Eyes:      General: No scleral icterus.  Cardiovascular:      Rate and Rhythm: Normal rate and regular rhythm.      Pulses: Normal pulses.   Pulmonary:      Effort: Pulmonary effort is normal. No respiratory distress.   Abdominal:      General: Abdomen is flat.      Tenderness: There is no abdominal tenderness.   Musculoskeletal:         General: Normal range of motion.      Cervical back: Normal range of motion and neck supple. No rigidity or tenderness.   Lymphadenopathy:      Cervical: No cervical adenopathy.   Skin:     General: Skin is warm and dry.       Capillary Refill: Capillary refill takes less than 2 seconds.      Findings: No erythema or rash.   Neurological:      General: No focal deficit present.      Mental Status: She is alert and oriented to person, place, and time. Mental status is at baseline.   Psychiatric:         Mood and Affect: Mood normal.         Procedures           ED Course                                   BRADLY reviewed by Elan Almanzar MD             MDM  Number of Diagnoses or Management Options  Periapical abscess with sinus tract: minor  Risk of Complications, Morbidity, and/or Mortality  Presenting problems: minimal  Diagnostic procedures: minimal  Management options: minimal    Patient Progress  Patient progress: stable      Final diagnoses:   Periapical abscess with sinus tract       ED Disposition  ED Disposition     ED Disposition Condition Comment    Discharge Stable           Jacob Robledo MD  [address] [place] [address] [id number] [phone]    In 2 days  FOR FOLLOW UP, As needed    [facility]  [facility] [address] [id number]  [phone]  Call   FOR FOLLOW UP         Medication List      New Prescriptions    HYDROcodone-acetaminophen 5-325 MG per tablet  Commonly known as: NORCO  Take 1 tablet by mouth Every 6 (Six) Hours As Needed for Moderate Pain .     ondansetron ODT 4 MG disintegrating tablet  Commonly known as: ZOFRAN-ODT  Place 1 tablet on the tongue 4 (Four) Times a Day As Needed for Nausea or Vomiting.     penicillin v potassium 500 MG tablet  Commonly known as: VEETID  Take 1 tablet by mouth 3 (Three) Times a Day.           Where to Get Your Medications      These medications were sent to [facility]/pharmacy #[id number] - [place], [place] - [address] - [phone] [facility] [phone]   [address] [place] [address]    Hours: 24-hours Phone: [phone]   · HYDROcodone-acetaminophen 5-325 MG per tablet  · ondansetron ODT 4 MG disintegrating tablet  · penicillin  v potassium 500 MG tablet          Radha Martinez, APRN  01/23/22 0111

## 2022-01-23 NOTE — DISCHARGE INSTRUCTIONS
Rest, drink plenty of fluids.  Take your meds as prescribed.  Complete the antibiotics.  Follow-up with your primary care provider as needed.  Follow-up with a dentist of your choice or with  of  [facility] for further evaluation and treatment.  Return to the emergency department for any acutely developing airway difficulties, any fevers, any persistent vomiting or any new or worse concerns.

## 2022-03-04 ENCOUNTER — HOSPITAL ENCOUNTER (EMERGENCY)
Facility: HOSPITAL | Age: 42
Discharge: HOME | End: 2022-03-04
Attending: EMERGENCY MEDICINE | Admitting: EMERGENCY MEDICINE

## 2022-03-04 VITALS
HEART RATE: 78 BPM | DIASTOLIC BLOOD PRESSURE: 78 MMHG | BODY MASS INDEX: 30.62 KG/M2 | SYSTOLIC BLOOD PRESSURE: 137 MMHG | OXYGEN SATURATION: 95 % | HEIGHT: 67 IN | RESPIRATION RATE: 16 BRPM | TEMPERATURE: 98.2 F | WEIGHT: 195.11 LBS

## 2022-03-04 DIAGNOSIS — K04.7 DENTAL ABSCESS: Primary | ICD-10-CM

## 2022-03-04 PROCEDURE — 99283 EMERGENCY DEPT VISIT LOW MDM: CPT

## 2022-03-04 RX ORDER — PENICILLIN V POTASSIUM 250 MG/1
500 TABLET ORAL ONCE
Status: COMPLETED | OUTPATIENT
Start: 2022-03-04 | End: 2022-03-04

## 2022-03-04 RX ORDER — OXYCODONE HYDROCHLORIDE AND ACETAMINOPHEN 5; 325 MG/1; MG/1
1 TABLET ORAL EVERY 6 HOURS PRN
Qty: 12 TABLET | Refills: 0 | Status: SHIPPED | OUTPATIENT
Start: 2022-03-04 | End: 2022-04-08

## 2022-03-04 RX ORDER — HYDROCODONE BITARTRATE AND ACETAMINOPHEN 5; 325 MG/1; MG/1
1 TABLET ORAL ONCE
Status: COMPLETED | OUTPATIENT
Start: 2022-03-04 | End: 2022-03-04

## 2022-03-04 RX ORDER — PENICILLIN V POTASSIUM 500 MG/1
500 TABLET ORAL 4 TIMES DAILY
Qty: 28 TABLET | Refills: 0 | Status: SHIPPED | OUTPATIENT
Start: 2022-03-04 | End: 2022-04-08

## 2022-03-04 RX ORDER — HYDROCODONE BITARTRATE AND ACETAMINOPHEN 5; 325 MG/1; MG/1
1 TABLET ORAL EVERY 4 HOURS PRN
Qty: 12 TABLET | Refills: 0 | Status: SHIPPED | OUTPATIENT
Start: 2022-03-04 | End: 2022-04-08

## 2022-03-04 RX ADMIN — HYDROCODONE BITARTRATE AND ACETAMINOPHEN 1 TABLET: 5; 325 TABLET ORAL at 20:27

## 2022-03-04 RX ADMIN — PENICILLIN V POTASSIUM 500 MG: 250 TABLET, FILM COATED ORAL at 20:27

## 2022-03-05 PROCEDURE — 99283 EMERGENCY DEPT VISIT LOW MDM: CPT

## 2022-03-05 PROCEDURE — 96374 THER/PROPH/DIAG INJ IV PUSH: CPT

## 2022-03-05 PROCEDURE — 96372 THER/PROPH/DIAG INJ SC/IM: CPT

## 2022-03-05 RX ORDER — SODIUM CHLORIDE 0.9 % (FLUSH) 0.9 %
10 SYRINGE (ML) INJECTION AS NEEDED
Status: DISCONTINUED | OUTPATIENT
Start: 2022-03-05 | End: 2022-03-06 | Stop reason: HOSPADM

## 2022-03-05 NOTE — ED PROVIDER NOTES
Time: 8:03 PM EST  Arrived by: private car  Chief Complaint: EAR PAIN, JAW PAIN, and FACIAL SWELLING   History provided by: pt  History is limited by: N/A     History of Present Illness:  Patient is a 41 y.o. female that presents to the emergency department with left EAR PAIN, left JAW PAIN, and left FACIAL SWELLING. This started two days ago and is still present and worsening. Symptoms are moderate in severity. Nothing improves or worsens symptoms. Pt states that she has a broken tooth on the left side and that she has been unable to find a dentist to take her insurance so she can get the tooth pulled.     She has throbbing dental pain but no SOB. Pt is an everyday cigarette smoker.     History provided by:  Patient    Recently seen: Pt was seen in this ED on 22 for dental pain.     Patient Care Team  Primary Care Provider: Tahmina Murray MD    Past Medical History:     Allergies   Allergen Reactions   • Aspirin Anaphylaxis   • Bactrim [Sulfamethoxazole-Trimethoprim] Anaphylaxis   • Gabapentin Other (See Comments)     Behavioral changes, aggressive behavior UNABLE TO EAT WHILE TAKING THE MEDICINE     Past Medical History:   Diagnosis Date   • Abdominal pain     SCHEDULED FOR SX   • Abscess     LEFT UPPER JAW, TOOTH BROKE OFF   • Anemia    • Arthritis     FINGERS & TOES   • Asthma    • COPD (chronic obstructive pulmonary disease) (HCC)    • Migraines      Past Surgical History:   Procedure Laterality Date   • APPENDECTOMY     •  SECTION      X3   • CHOLECYSTECTOMY      LAP   • DIAGNOSTIC LAPAROSCOPY N/A 10/25/2018    Procedure: DIAGNOSTIC LAPAROSCOPY with lysis of adhesions;  Surgeon: Joe Hastings MD;  Location: [facility];  Service: Obstetrics/Gynecology   • HYSTERECTOMY      OPEN   • TUBAL ABDOMINAL LIGATION       Family History   Problem Relation Age of Onset   • Heart attack Mother    • Diabetes Mother    • Heart disease Father    • Stroke Father    • Lung cancer Father    • Heart  attack Sister    • Heart attack Brother    • Diabetes Maternal Grandmother    • Diabetes Paternal Grandmother    • Heart attack Brother    • Heart attack Brother    • Heart attack Sister    • Heart attack Sister    • Ovarian cancer Sister        Home Medications:  Prior to Admission medications    Medication Sig Start Date End Date Taking? Authorizing Provider   albuterol (PROAIR RESPICLICK) 108 (90 Base) MCG/ACT inhaler Inhale 2 puffs Every 4 (Four) Hours As Needed for Wheezing (asthma). 7/31/19   Quin Paulino APRN   benzonatate (TESSALON) 200 MG capsule Take 1 capsule by mouth 3 (Three) Times a Day As Needed for Cough. 9/5/21   Phuc Herrera MD   fluticasone-salmeterol (ADVAIR DISKUS) 100-50 MCG/DOSE DISKUS Inhale 1 puff 2 (Two) Times a Day. 8/5/19   Quin Paulino APRN   folic acid (FOLVITE) 1 MG tablet Take 1 mg by mouth Daily.    ProviderDarell MD   HYDROcodone-acetaminophen (NORCO) 5-325 MG per tablet Take 1 tablet by mouth Every 6 (Six) Hours As Needed for Moderate Pain . 1/23/22   Elan Almanzar MD   ibuprofen (ADVIL,MOTRIN) 800 MG tablet Take 1 tablet by mouth Every 8 (Eight) Hours As Needed for Mild Pain . 7/31/19   Quin Paulino APRN   ondansetron ODT (ZOFRAN-ODT) 4 MG disintegrating tablet Place 1 tablet on the tongue 4 (Four) Times a Day As Needed for Nausea or Vomiting. 1/23/22   Radha Martinez APRN   penicillin v potassium (VEETID) 500 MG tablet Take 1 tablet by mouth 3 (Three) Times a Day. 1/23/22   Radha Martinez APRN   pregabalin (Lyrica) 75 MG capsule Take 1 capsule by mouth 2 (Two) Times a Day. 6/17/21   Leigh Patel APRN   tiZANidine (ZANAFLEX) 4 MG tablet Take 4 mg by mouth 3 (Three) Times a Day. 5/24/21   Darell Mart MD   traZODone (DESYREL) 50 MG tablet 1/2 - 2 tabs 30 minutes before bedtime 7/31/19   Paulino, Quin, APRN   vitamin D3 125 MCG (5000 UT) capsule capsule Take 5,000 Units by mouth Daily.    Provider, Historical, MD        Social History:  "  Social History     Tobacco Use   • Smoking status: Current Every Day Smoker     Packs/day: 1.00     Years: 25.00     Pack years: 25.00     Types: Cigarettes   • Smokeless tobacco: Never Used   Substance Use Topics   • Alcohol use: No   • Drug use: No     Recent travel: no     Review of Systems:  Review of Systems   Constitutional: Negative for chills, diaphoresis and fever.   HENT: Positive for dental problem, ear pain (left) and facial swelling (left side). Negative for ear discharge and nosebleeds.         Left jaw pain   Eyes: Negative for photophobia.   Respiratory: Negative for shortness of breath.    Cardiovascular: Negative for chest pain.   Gastrointestinal: Negative for diarrhea, nausea and vomiting.   Genitourinary: Negative for dysuria.   Musculoskeletal: Negative for back pain and neck pain.   Skin: Negative for rash.   Neurological: Negative for headaches.        Physical Exam:  /78   Pulse 78   Temp 98.2 °F (36.8 °C) (Oral)   Resp 16   Ht 168.9 cm (66.5\")   Wt 88.5 kg (195 lb 1.7 oz)   LMP  (LMP Unknown)   SpO2 95%   BMI 31.02 kg/m²     Physical Exam  Vitals and nursing note reviewed.   Constitutional:       General: She is not in acute distress.     Appearance: Normal appearance.   HENT:      Head: Normocephalic and atraumatic.      Comments: Localized swelling to the left mandible.      Right Ear: Tympanic membrane normal.      Left Ear: Tympanic membrane normal.      Nose: Nose normal.      Mouth/Throat:      Mouth: Mucous membranes are moist.      Comments: Severe dental diffuse dental caries with no localized fluctuance for pointing.   Eyes:      General: No scleral icterus.  Cardiovascular:      Rate and Rhythm: Normal rate and regular rhythm.      Heart sounds: Normal heart sounds.   Pulmonary:      Effort: Pulmonary effort is normal. No respiratory distress.      Breath sounds: Normal breath sounds.   Abdominal:      Palpations: Abdomen is soft.      Tenderness: There is no " abdominal tenderness.   Musculoskeletal:         General: Normal range of motion.      Cervical back: Neck supple.      Right lower leg: No edema.      Left lower leg: No edema.   Skin:     General: Skin is warm and dry.   Neurological:      General: No focal deficit present.      Mental Status: She is alert and oriented to person, place, and time.      Sensory: No sensory deficit.      Motor: No weakness.              Medications in the Emergency Department:  Medications   HYDROcodone-acetaminophen (NORCO) 5-325 MG per tablet 1 tablet (1 tablet Oral Given 3/4/22 2027)   penicillin v potassium (VEETID) tablet 500 mg (500 mg Oral Given 3/4/22 2027)        Labs  Lab Results (last 24 hours)     ** No results found for the last 24 hours. **           Imaging:  No Radiology Exams Resulted Within Past 24 Hours    Procedures:  Procedures    Progress  ED Course as of 03/04/22 2252   Fri Mar 04, 2022   2026 [facility] called, out of hydrocodone 5 mg.  They do have Percocet 5 mg.  He is deleting the prescription and I will change it over now. [RP]      ED Course User Index  [RP] Song Muro MD                            Medical Decision Making:  MDM  Number of Diagnoses or Management Options  Risk of Complications, Morbidity, and/or Mortality  Presenting problems: low  Management options: low    Patient Progress  Patient progress: stable       Final diagnoses:   Dental abscess        Disposition:  ED Disposition     ED Disposition Condition Comment    Discharge Stable           Documentation assistance provided by Shari Santiago acting as scribe for Song Muro MD. Information recorded by the scribe was done at my direction and has been verified and validated by me.        Shari Santiago  03/04/22 2009       Shari Santiago  03/04/22 2009       Song Muro MD  03/04/22 2020       oSng Muro MD  03/04/22 2252

## 2022-03-05 NOTE — DISCHARGE INSTRUCTIONS
Return to the emergency department tomorrow for fever, worsening neck swelling, any difficulty breathing.

## 2022-03-06 ENCOUNTER — HOSPITAL ENCOUNTER (EMERGENCY)
Facility: HOSPITAL | Age: 42
Discharge: HOME | End: 2022-03-06
Attending: EMERGENCY MEDICINE | Admitting: EMERGENCY MEDICINE

## 2022-03-06 VITALS
OXYGEN SATURATION: 98 % | DIASTOLIC BLOOD PRESSURE: 59 MMHG | TEMPERATURE: 99.1 F | RESPIRATION RATE: 16 BRPM | HEIGHT: 67 IN | HEART RATE: 79 BPM | WEIGHT: 186.29 LBS | SYSTOLIC BLOOD PRESSURE: 113 MMHG | BODY MASS INDEX: 29.24 KG/M2

## 2022-03-06 DIAGNOSIS — R11.2 NON-INTRACTABLE VOMITING WITH NAUSEA, UNSPECIFIED VOMITING TYPE: Primary | ICD-10-CM

## 2022-03-06 DIAGNOSIS — R10.84 GENERALIZED ABDOMINAL PAIN: ICD-10-CM

## 2022-03-06 LAB
ALBUMIN SERPL-MCNC: 4.3 G/DL (ref 3.5–5.2)
ALBUMIN/GLOB SERPL: 1.5 G/DL
ALP SERPL-CCNC: 84 U/L (ref 39–117)
ALT SERPL W P-5'-P-CCNC: 23 U/L (ref 1–33)
ANION GAP SERPL CALCULATED.3IONS-SCNC: 11 MMOL/L (ref 5–15)
AST SERPL-CCNC: 22 U/L (ref 1–32)
BASOPHILS # BLD AUTO: 0.03 10*3/MM3 (ref 0–0.2)
BASOPHILS NFR BLD AUTO: 0.2 % (ref 0–1.5)
BILIRUB SERPL-MCNC: 0.5 MG/DL (ref 0–1.2)
BILIRUB UR QL STRIP: NEGATIVE
BUN SERPL-MCNC: 13 MG/DL (ref 6–20)
BUN/CREAT SERPL: 18.1 (ref 7–25)
CALCIUM SPEC-SCNC: 9 MG/DL (ref 8.6–10.5)
CHLORIDE SERPL-SCNC: 100 MMOL/L (ref 98–107)
CLARITY UR: CLEAR
CO2 SERPL-SCNC: 23 MMOL/L (ref 22–29)
COLOR UR: YELLOW
CREAT SERPL-MCNC: 0.72 MG/DL (ref 0.57–1)
DEPRECATED RDW RBC AUTO: 47.3 FL (ref 37–54)
EGFRCR SERPLBLD CKD-EPI 2021: 106.5 ML/MIN/1.73
EOSINOPHIL # BLD AUTO: 0.02 10*3/MM3 (ref 0–0.4)
EOSINOPHIL NFR BLD AUTO: 0.2 % (ref 0.3–6.2)
ERYTHROCYTE [DISTWIDTH] IN BLOOD BY AUTOMATED COUNT: 14.8 % (ref 12.3–15.4)
GLOBULIN UR ELPH-MCNC: 2.9 GM/DL
GLUCOSE SERPL-MCNC: 134 MG/DL (ref 65–99)
GLUCOSE UR STRIP-MCNC: NEGATIVE MG/DL
HCT VFR BLD AUTO: 40.1 % (ref 34–46.6)
HGB BLD-MCNC: 14.1 G/DL (ref 12–15.9)
HGB UR QL STRIP.AUTO: NEGATIVE
HOLD SPECIMEN: NORMAL
HOLD SPECIMEN: NORMAL
IMM GRANULOCYTES # BLD AUTO: 0.04 10*3/MM3 (ref 0–0.05)
IMM GRANULOCYTES NFR BLD AUTO: 0.3 % (ref 0–0.5)
KETONES UR QL STRIP: ABNORMAL
LEUKOCYTE ESTERASE UR QL STRIP.AUTO: NEGATIVE
LIPASE SERPL-CCNC: 32 U/L (ref 13–60)
LYMPHOCYTES # BLD AUTO: 1.97 10*3/MM3 (ref 0.7–3.1)
LYMPHOCYTES NFR BLD AUTO: 16.4 % (ref 19.6–45.3)
MCH RBC QN AUTO: 30.9 PG (ref 26.6–33)
MCHC RBC AUTO-ENTMCNC: 35.2 G/DL (ref 31.5–35.7)
MCV RBC AUTO: 87.7 FL (ref 79–97)
MONOCYTES # BLD AUTO: 0.8 10*3/MM3 (ref 0.1–0.9)
MONOCYTES NFR BLD AUTO: 6.7 % (ref 5–12)
NEUTROPHILS NFR BLD AUTO: 76.2 % (ref 42.7–76)
NEUTROPHILS NFR BLD AUTO: 9.17 10*3/MM3 (ref 1.7–7)
NITRITE UR QL STRIP: NEGATIVE
NRBC BLD AUTO-RTO: 0 /100 WBC (ref 0–0.2)
PH UR STRIP.AUTO: 6.5 [PH] (ref 5–8)
PLATELET # BLD AUTO: 344 10*3/MM3 (ref 140–450)
PMV BLD AUTO: 9.4 FL (ref 6–12)
POTASSIUM SERPL-SCNC: 4 MMOL/L (ref 3.5–5.2)
PROT SERPL-MCNC: 7.2 G/DL (ref 6–8.5)
PROT UR QL STRIP: NEGATIVE
RBC # BLD AUTO: 4.57 10*6/MM3 (ref 3.77–5.28)
SODIUM SERPL-SCNC: 134 MMOL/L (ref 136–145)
SP GR UR STRIP: 1.02 (ref 1–1.03)
UROBILINOGEN UR QL STRIP: ABNORMAL
WBC NRBC COR # BLD: 12.03 10*3/MM3 (ref 3.4–10.8)
WHOLE BLOOD HOLD SPECIMEN: NORMAL
WHOLE BLOOD HOLD SPECIMEN: NORMAL

## 2022-03-06 PROCEDURE — 85025 COMPLETE CBC W/AUTO DIFF WBC: CPT | Performed by: EMERGENCY MEDICINE

## 2022-03-06 PROCEDURE — 80053 COMPREHEN METABOLIC PANEL: CPT | Performed by: EMERGENCY MEDICINE

## 2022-03-06 PROCEDURE — 83690 ASSAY OF LIPASE: CPT | Performed by: EMERGENCY MEDICINE

## 2022-03-06 PROCEDURE — 81003 URINALYSIS AUTO W/O SCOPE: CPT | Performed by: EMERGENCY MEDICINE

## 2022-03-06 PROCEDURE — 25010000002 ONDANSETRON PER 1 MG: Performed by: REGISTERED NURSE

## 2022-03-06 PROCEDURE — 25010000002 DICYCLOMINE PER 20 MG: Performed by: REGISTERED NURSE

## 2022-03-06 PROCEDURE — 96374 THER/PROPH/DIAG INJ IV PUSH: CPT

## 2022-03-06 PROCEDURE — 96372 THER/PROPH/DIAG INJ SC/IM: CPT

## 2022-03-06 RX ORDER — DICYCLOMINE HYDROCHLORIDE 10 MG/ML
20 INJECTION INTRAMUSCULAR ONCE
Status: COMPLETED | OUTPATIENT
Start: 2022-03-06 | End: 2022-03-06

## 2022-03-06 RX ORDER — ONDANSETRON 2 MG/ML
4 INJECTION INTRAMUSCULAR; INTRAVENOUS ONCE
Status: COMPLETED | OUTPATIENT
Start: 2022-03-06 | End: 2022-03-06

## 2022-03-06 RX ORDER — DICYCLOMINE HCL 20 MG
20 TABLET ORAL EVERY 6 HOURS
Qty: 20 TABLET | Refills: 0 | Status: SHIPPED | OUTPATIENT
Start: 2022-03-06 | End: 2022-05-03

## 2022-03-06 RX ORDER — ONDANSETRON 4 MG/1
4 TABLET, ORALLY DISINTEGRATING ORAL EVERY 4 HOURS PRN
Qty: 9 TABLET | Refills: 0 | Status: SHIPPED | OUTPATIENT
Start: 2022-03-06

## 2022-03-06 RX ADMIN — DICYCLOMINE HYDROCHLORIDE 20 MG: 20 INJECTION, SOLUTION INTRAMUSCULAR at 00:37

## 2022-03-06 RX ADMIN — ONDANSETRON 4 MG: 2 INJECTION INTRAMUSCULAR; INTRAVENOUS at 00:37

## 2022-03-06 RX ADMIN — SODIUM CHLORIDE 1000 ML: 9 INJECTION, SOLUTION INTRAVENOUS at 00:36

## 2022-03-06 NOTE — DISCHARGE INSTRUCTIONS
Rest.  Drink plenty of fluids.  Eat a clear liquid diet for the next 24 hours and then advance as tolerated.  Take your previously prescribed antibiotics and pain medication according to instructions.  Take Zofran for nausea/vomiting and Bentyl for abdominal pain.  Be sure to keep your appointment with your dentist on Monday to reevaluate your dental infection.    Return for worsening symptoms or new concerns.

## 2022-03-06 NOTE — ED PROVIDER NOTES
Subjective   Patient is 41-year-old female presents to the ER for nausea and diarrhea.  Patient states she was seen here yesterday and diagnosed with dental infection, given an antibiotic but not treated for her nausea and vomiting.  She continues to have vomiting today and also developed diarrhea this afternoon.  She is not been able to keep down any of her medications.  She reports fever and chills and abdominal cramping pain, denies blood in her stool, weakness or dizziness.    She reports that she has a dental appointment on Monday.  She had been able to see a dentist yet because of insurance issues.       used: No    Vomiting  The primary symptoms include vomiting and diarrhea. Primary symptoms do not include fever, abdominal pain or nausea. The illness began yesterday. The onset was sudden.   The illness does not include chills or constipation.       Review of Systems   Constitutional: Negative for chills and fever.   HENT: Positive for dental problem. Negative for congestion, ear pain and sore throat.    Eyes: Negative for pain.   Respiratory: Negative for cough, chest tightness and shortness of breath.    Cardiovascular: Negative for chest pain.   Gastrointestinal: Positive for diarrhea and vomiting. Negative for abdominal distention, abdominal pain, blood in stool, constipation and nausea.   Genitourinary: Negative for flank pain and hematuria.   Musculoskeletal: Negative for joint swelling.   Skin: Negative for pallor.   Neurological: Negative for seizures and headaches.   All other systems reviewed and are negative.      Past Medical History:   Diagnosis Date   • Abdominal pain     SCHEDULED FOR SX   • Abscess     LEFT UPPER JAW, TOOTH BROKE OFF   • Anemia    • Arthritis     FINGERS & TOES   • Asthma    • COPD (chronic obstructive pulmonary disease) ([facility])    • Migraines        Allergies   Allergen Reactions   • Aspirin Anaphylaxis   • Bactrim [Sulfamethoxazole-Trimethoprim] Anaphylaxis    • Gabapentin Other (See Comments)     Behavioral changes, aggressive behavior UNABLE TO EAT WHILE TAKING THE MEDICINE       Past Surgical History:   Procedure Laterality Date   • APPENDECTOMY     •  SECTION      X3   • CHOLECYSTECTOMY      LAP   • DIAGNOSTIC LAPAROSCOPY N/A 10/25/2018    Procedure: DIAGNOSTIC LAPAROSCOPY with lysis of adhesions;  Surgeon: Joe Hastings MD;  Location: [facility];  Service: Obstetrics/Gynecology   • HYSTERECTOMY      OPEN   • TUBAL ABDOMINAL LIGATION         Family History   Problem Relation Age of Onset   • Heart attack Mother    • Diabetes Mother    • Heart disease Father    • Stroke Father    • Lung cancer Father    • Heart attack Sister    • Heart attack Brother    • Diabetes Maternal Grandmother    • Diabetes Paternal Grandmother    • Heart attack Brother    • Heart attack Brother    • Heart attack Sister    • Heart attack Sister    • Ovarian cancer Sister        Social History     Socioeconomic History   • Marital status:    Tobacco Use   • Smoking status: Current Every Day Smoker     Packs/day: 1.00     Years: 25.00     Pack years: 25.00     Types: Cigarettes   • Smokeless tobacco: Never Used   Vaping Use   • Vaping Use: Never used   Substance and Sexual Activity   • Alcohol use: No   • Drug use: No   • Sexual activity: Defer           Objective   Physical Exam  Vitals and nursing note reviewed.   Constitutional:       General: She is not in acute distress.     Appearance: Normal appearance. She is not toxic-appearing.   HENT:      Head: Normocephalic and atraumatic.      Mouth/Throat:      Mouth: Mucous membranes are moist.      Dentition: Dental tenderness and dental caries present.        Comments: Mild gum swelling, no discoloration  Eyes:      General: No scleral icterus.  Cardiovascular:      Rate and Rhythm: Normal rate and regular rhythm.      Pulses: Normal pulses.      Heart sounds: Normal heart sounds.   Pulmonary:      Effort:  Pulmonary effort is normal. No respiratory distress.      Breath sounds: Normal breath sounds.   Abdominal:      General: Abdomen is protuberant. Bowel sounds are normal. There is no distension.      Palpations: Abdomen is soft.      Tenderness: There is generalized abdominal tenderness. There is no right CVA tenderness, left CVA tenderness, guarding or rebound.   Musculoskeletal:         General: Normal range of motion.      Cervical back: Normal range of motion and neck supple.   Skin:     General: Skin is warm and dry.   Neurological:      Mental Status: She is alert and oriented to person, place, and time. Mental status is at baseline.         Procedures           ED Course                                                 MDM  Number of Diagnoses or Management Options  Generalized abdominal pain: new and requires workup  Non-intractable vomiting with nausea, unspecified vomiting type: new and requires workup  Diagnosis management comments: The patient comes to the ED for evaluation of vomiting. Emesis is much improved in the ED. The patient was given antiemetics in the ED. The patient is resting comfortably and feels better, is alert and in no distress. Repeat examination is unremarkable and benign; in particular, there's no discomfort at McBurney's point. The history, exam, diagnostic testing, and current condition does not suggest acute appendicitis, bowel instruction, acute cholecystitis, bowel perforation, major gastrointestinal bleeding, severe diverticulitis, abdominal aortic aneurysm, mesenteric ischemia, volvulus, sepsis, or other significant pathology that warrants further testing, continued ED treatment, admission, for surgical evaluation at this point. The vital signs have been stable. Bloodwork performed shows no signs of acute renal failure. The patient does not have uncontrollable pain, intractable vomiting, or other significant symptoms. The patient is now able to tolerate po intake in the ED and  has passed a po challenge. The patient's condition is stable and appropriate for discharge from the emergency department.       Amount and/or Complexity of Data Reviewed  Clinical lab tests: reviewed and ordered    Risk of Complications, Morbidity, and/or Mortality  Presenting problems: minimal  Diagnostic procedures: minimal  Management options: minimal    Patient Progress  Patient progress: stable      Final diagnoses:   Non-intractable vomiting with nausea, unspecified vomiting type   Generalized abdominal pain       ED Disposition  ED Disposition     ED Disposition   Discharge    Condition   Stable    Comment   --             Tahmina Murray MD  [address] [id number] [phone]    Call   As needed         Medication List      New Prescriptions    dicyclomine 20 MG tablet  Commonly known as: BENTYL  Take 1 tablet by mouth Every 6 (Six) Hours.        Changed    ondansetron ODT 4 MG disintegrating tablet  Commonly known as: ZOFRAN-ODT  Place 1 tablet on the tongue Every 4 (Four) Hours As Needed for Nausea or Vomiting.  What changed: when to take this           Where to Get Your Medications      These medications were sent to [facility]/pharmacy #[id number] - [place] - [address] - [phone] [facility] [phone]   [address] [place] [address]    Hours: 24-hours Phone: [phone]   · dicyclomine 20 MG tablet  · ondansetron ODT 4 MG disintegrating tablet          Yudelka Lezama APRN  03/07/22 0048

## 2022-04-05 ENCOUNTER — HOSPITAL ENCOUNTER (EMERGENCY)
Facility: HOSPITAL | Age: 42
Discharge: HOME | End: 2022-04-05
Attending: EMERGENCY MEDICINE | Admitting: EMERGENCY MEDICINE

## 2022-04-05 ENCOUNTER — OFFICE VISIT (OUTPATIENT)
Dept: OBSTETRICS AND GYNECOLOGY | Facility: CLINIC | Age: 42
End: 2022-04-05

## 2022-04-05 ENCOUNTER — APPOINTMENT (OUTPATIENT)
Dept: GENERAL RADIOLOGY | Facility: HOSPITAL | Age: 42
End: 2022-04-05

## 2022-04-05 VITALS
HEIGHT: 66 IN | WEIGHT: 197 LBS | DIASTOLIC BLOOD PRESSURE: 66 MMHG | HEART RATE: 81 BPM | TEMPERATURE: 98 F | SYSTOLIC BLOOD PRESSURE: 137 MMHG | OXYGEN SATURATION: 98 % | BODY MASS INDEX: 31.66 KG/M2 | RESPIRATION RATE: 18 BRPM

## 2022-04-05 VITALS
SYSTOLIC BLOOD PRESSURE: 138 MMHG | WEIGHT: 197 LBS | BODY MASS INDEX: 31.66 KG/M2 | DIASTOLIC BLOOD PRESSURE: 80 MMHG | HEIGHT: 66 IN

## 2022-04-05 DIAGNOSIS — N73.6 PELVIC ADHESIVE DISEASE: ICD-10-CM

## 2022-04-05 DIAGNOSIS — N89.8 VAGINAL DISCHARGE: ICD-10-CM

## 2022-04-05 DIAGNOSIS — F17.200 SMOKER: ICD-10-CM

## 2022-04-05 DIAGNOSIS — S93.401A SPRAIN OF RIGHT ANKLE, UNSPECIFIED LIGAMENT, INITIAL ENCOUNTER: Primary | ICD-10-CM

## 2022-04-05 DIAGNOSIS — Z01.419 WELL WOMAN EXAM: ICD-10-CM

## 2022-04-05 DIAGNOSIS — Z90.710 H/O ABDOMINAL HYSTERECTOMY: ICD-10-CM

## 2022-04-05 DIAGNOSIS — Z13.9 SCREENING FOR CONDITION: ICD-10-CM

## 2022-04-05 DIAGNOSIS — R10.2 PELVIC PAIN: Primary | ICD-10-CM

## 2022-04-05 PROCEDURE — 73610 X-RAY EXAM OF ANKLE: CPT

## 2022-04-05 PROCEDURE — 99396 PREV VISIT EST AGE 40-64: CPT | Performed by: OBSTETRICS & GYNECOLOGY

## 2022-04-05 PROCEDURE — 99283 EMERGENCY DEPT VISIT LOW MDM: CPT

## 2022-04-05 RX ORDER — ONDANSETRON 4 MG/1
TABLET, FILM COATED ORAL
COMMUNITY
Start: 2022-01-24 | End: 2022-04-08

## 2022-04-05 RX ORDER — LORATADINE 10 MG/1
10 TABLET ORAL DAILY
COMMUNITY
Start: 2022-03-18 | End: 2022-04-08

## 2022-04-05 RX ORDER — BUDESONIDE AND FORMOTEROL FUMARATE DIHYDRATE 160; 4.5 UG/1; UG/1
2 AEROSOL RESPIRATORY (INHALATION) 2 TIMES DAILY
COMMUNITY
Start: 2022-03-18

## 2022-04-05 RX ORDER — CARIPRAZINE 1.5 MG/1
1.5 CAPSULE, GELATIN COATED ORAL DAILY
COMMUNITY
Start: 2022-03-30 | End: 2022-04-08

## 2022-04-05 NOTE — ED PROVIDER NOTES
Subjective   Pt states she accidentally twisted right ankle last night while trying to step over gate and dog/pants got in way. Can't bear weight. No previous injury/surgery to that area before.       History provided by:  Patient  Ankle Injury  Location:  R  Severity:  Moderate  Onset quality:  Sudden  Duration:  1 day  Timing:  Constant  Progression:  Unchanged  Chronicity:  New  Associated symptoms: no fatigue and no fever        Review of Systems   Constitutional: Negative for appetite change, chills, fatigue and fever.   HENT: Negative.    Eyes: Negative.  Negative for photophobia.   Respiratory: Negative.    Gastrointestinal: Negative.    Endocrine: Negative.    Genitourinary: Negative.    Musculoskeletal: Negative.    Skin: Negative.    Allergic/Immunologic: Negative.  Negative for immunocompromised state.   Neurological: Negative.    Hematological: Negative.    Psychiatric/Behavioral: Negative.    All other systems reviewed and are negative.      Past Medical History:   Diagnosis Date   • Abdominal pain     SCHEDULED FOR SX   • Abscess     LEFT UPPER JAW, TOOTH BROKE OFF   • Anemia    • Arthritis     FINGERS & TOES   • Asthma    • COPD (chronic obstructive pulmonary disease) (H[facility])    • Migraines        Allergies   Allergen Reactions   • Aspirin Anaphylaxis   • Sulfamethoxazole-Trimethoprim Anaphylaxis and Hives   • Gabapentin Other (See Comments)     Behavioral changes, aggressive behavior UNABLE TO EAT WHILE TAKING THE MEDICINE       Past Surgical History:   Procedure Laterality Date   • APPENDECTOMY     •  SECTION      X3   • CHOLECYSTECTOMY      LAP   • DIAGNOSTIC LAPAROSCOPY N/A 10/25/2018    Procedure: DIAGNOSTIC LAPAROSCOPY with lysis of adhesions;  Surgeon: Joe Hastings MD;  Location: [facility];  Service: Obstetrics/Gynecology   • HYSTERECTOMY      OPEN   • TUBAL ABDOMINAL LIGATION         Family History   Problem Relation Age of Onset   • Heart attack Mother    • Diabetes  Mother    • Heart disease Father    • Stroke Father    • Lung cancer Father    • Heart attack Sister    • Heart attack Brother    • Diabetes Maternal Grandmother    • Diabetes Paternal Grandmother    • Heart attack Brother    • Heart attack Brother    • Heart attack Sister    • Heart attack Sister    • Ovarian cancer Sister        Social History     Socioeconomic History   • Marital status:    Tobacco Use   • Smoking status: Current Every Day Smoker     Packs/day: 1.00     Years: 25.00     Pack years: 25.00     Types: Cigarettes   • Smokeless tobacco: Never Used   Vaping Use   • Vaping Use: Never used   Substance and Sexual Activity   • Alcohol use: No   • Drug use: No   • Sexual activity: Defer         Objective   Physical Exam  Vitals and nursing note reviewed.   Constitutional:       General: She is not in acute distress.     Appearance: Normal appearance. She is not ill-appearing or toxic-appearing.   HENT:      Head: Normocephalic and atraumatic.   Pulmonary:      Effort: Pulmonary effort is normal.   Musculoskeletal:        Legs:    Neurological:      Mental Status: She is alert and oriented to person, place, and time. Mental status is at baseline.   Psychiatric:         Mood and Affect: Mood normal.         Behavior: Behavior normal.         Thought Content: Thought content normal.         Judgment: Judgment normal.             MDM  Number of Diagnoses or Management Options  Sprain of right ankle, unspecified ligament, initial encounter  Diagnosis management comments: Narrative & Impression  PROCEDURE:  XR ANKLE 3+ VW RIGHT     COMPARISON: None     INDICATIONS:  TRIPPED OVER DOG, RIGHT ANKLE PAIN     FINDINGS:          There is no acute fracture or dislocation.  The ankle mortise and talar dome appear intact.  The   tibiotalar and subtalar joints appear in normal alignment.  There is a small accessory os   navicularis.     IMPRESSION:                 1. No acute osseous abnormality of the right ankle.                  RONEY ANDINO MD         Electronically Signed and Approved By: RONEY ANDINO MD on 4/05/2022 at 16:36          Amount and/or Complexity of Data Reviewed  Tests in the radiology section of CPT®: reviewed    Risk of Complications, Morbidity, and/or Mortality  Presenting problems: moderate  Diagnostic procedures: moderate  Management options: moderate    Patient Progress  Patient progress: stable      Final diagnoses:   Sprain of right ankle, unspecified ligament, initial encounter       ED Disposition  ED Disposition     ED Disposition   Discharge    Condition   Stable    Comment   --             Leonard Chahal MD  [address] [place] [id number]  [phone]               Medication List      No changes were made to your prescriptions during this visit.          Don Holt PA  04/05/22 1353

## 2022-04-05 NOTE — PROGRESS NOTES
GYN Annual Exam     CC- Here for annual exam.     Pt new to practice? No  Pt new to me? No     Alexandra Latonya Cantrell is a 42 y.o. No obstetric history on file. female who presents for annual well woman exam. No LMP recorded (lmp unknown). Patient has had a hysterectomy.    Problems in addition to need for annual: recurrent chronic pelvic pain.  Pt has had a hysterectomy, and has documented pelvic adhesive disease.     HPI: Pelvic Pain  The patient's primary symptoms include pelvic pain. This is a chronic problem. The current episode started more than 1 month ago. The problem occurs intermittently. The problem has been gradually worsening. The pain is severe. The problem affects the left side. She is not pregnant. Associated symptoms include abdominal pain. Pertinent negatives include no dysuria, painful intercourse or urgency. The symptoms are aggravated by activity. She has tried nothing for the symptoms. She is sexually active. No, her partner does not have an STD. She uses hysterectomy for contraception.       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           Past Medical History:   Diagnosis Date   • Abdominal pain     SCHEDULED FOR SX   • Abscess     LEFT UPPER JAW, TOOTH BROKE OFF   • Anemia    • Arthritis     FINGERS & TOES   • Asthma    • COPD (chronic obstructive pulmonary disease) (HCC)    • Migraines        Past Surgical History:   Procedure Laterality Date   • APPENDECTOMY     •  SECTION      X3   • CHOLECYSTECTOMY      LAP   • DIAGNOSTIC LAPAROSCOPY N/A 10/25/2018     Procedure: DIAGNOSTIC LAPAROSCOPY with lysis of adhesions;  Surgeon: Joe Hastings MD;  Location: [facility];  Service: Obstetrics/Gynecology   • HYSTERECTOMY      OPEN   • TUBAL ABDOMINAL LIGATION           Current Outpatient Medications:   •  albuterol (PROAIR RESPICLICK) 108 (90 Base) MCG/ACT inhaler, Inhale 2 puffs Every 4 (Four) Hours As Needed for Wheezing (asthma)., Disp: 1 inhaler, Rfl: 11  •  Allergy Relief 10 MG tablet, Take 10 mg by mouth Daily., Disp: , Rfl:   •  dicyclomine (BENTYL) 20 MG tablet, Take 1 tablet by mouth Every 6 (Six) Hours., Disp: 20 tablet, Rfl: 0  •  fluticasone-salmeterol (ADVAIR DISKUS) 100-50 MCG/DOSE DISKUS, Inhale 1 puff 2 (Two) Times a Day., Disp: 60 each, Rfl: 5  •  folic acid (FOLVITE) 1 MG tablet, Take 1 mg by mouth Daily., Disp: , Rfl:   •  HYDROcodone-acetaminophen (NORCO) 5-325 MG per tablet, Take 1 tablet by mouth Every 6 (Six) Hours As Needed for Moderate Pain ., Disp: 12 tablet, Rfl: 0  •  HYDROcodone-acetaminophen (NORCO) 5-325 MG per tablet, Take 1 tablet by mouth Every 4 (Four) Hours As Needed for Moderate Pain ., Disp: 12 tablet, Rfl: 0  •  ibuprofen (ADVIL,MOTRIN) 800 MG tablet, Take 1 tablet by mouth Every 8 (Eight) Hours As Needed for Mild Pain ., Disp: 30 tablet, Rfl: 0  •  ondansetron (ZOFRAN) 4 MG tablet, TAKE 1 TABLET BY MOUTH EVERY 6 HOURS AS NEEDED FOR 5 DAYS, Disp: , Rfl:   •  ondansetron ODT (ZOFRAN-ODT) 4 MG disintegrating tablet, Place 1 tablet on the tongue Every 4 (Four) Hours As Needed for Nausea or Vomiting., Disp: 9 tablet, Rfl: 0  •  oxyCODONE-acetaminophen (PERCOCET) 5-325 MG per tablet, Take 1 tablet by mouth Every 6 (Six) Hours As Needed for Moderate Pain ., Disp: 12 tablet, Rfl: 0  •  penicillin v potassium (VEETID) 500 MG tablet, Take 1 tablet by mouth 3 (Three) Times a Day., Disp: 21 tablet, Rfl: 0  •  penicillin v potassium (VEETID) 500 MG tablet, Take 1 tablet by mouth 4 (Four) Times a Day., Disp: 28 tablet, Rfl: 0  •   pregabalin (Lyrica) 75 MG capsule, Take 1 capsule by mouth 2 (Two) Times a Day., Disp: 60 capsule, Rfl: 0  •  ProAir  (90 Base) MCG/ACT inhaler, INHALE 1 PUFF BY MOUTH EVERY 4 HOURS AS NEEDED, Disp: , Rfl:   •  Symbicort 160-4.5 MCG/ACT inhaler, Inhale 2 puffs 2 (Two) Times a Day., Disp: , Rfl:   •  tiZANidine (ZANAFLEX) 4 MG tablet, Take 4 mg by mouth 3 (Three) Times a Day., Disp: , Rfl:   •  traZODone (DESYREL) 50 MG tablet, 1/2 - 2 tabs 30 minutes before bedtime, Disp: 14 tablet, Rfl: 2  •  vitamin D3 125 MCG (5000 UT) capsule capsule, Take 5,000 Units by mouth Daily., Disp: , Rfl:   •  Vraylar 1.5 MG capsule capsule, Take 1.5 mg by mouth Daily., Disp: , Rfl:     Allergies   Allergen Reactions   • Aspirin Anaphylaxis   • Sulfamethoxazole-Trimethoprim Anaphylaxis and Hives   • Gabapentin Other (See Comments)     Behavioral changes, aggressive behavior UNABLE TO EAT WHILE TAKING THE MEDICINE       Social History     Tobacco Use   • Smoking status: Current Every Day Smoker     Packs/day: 1.00     Years: 25.00     Pack years: 25.00     Types: Cigarettes   • Smokeless tobacco: Never Used   Vaping Use   • Vaping Use: Never used   Substance Use Topics   • Alcohol use: No   • Drug use: No       Alexandra Latonya Cantrell  reports that she has been smoking cigarettes. She has a 25.00 pack-year smoking history. She has never used smokeless tobacco.. I have educated her on the risk of diseases from using tobacco products such as cancer, COPD and heart disease.     I advised her to quit            Family History   Problem Relation Age of Onset   • Heart attack Mother    • Diabetes Mother    • Heart disease Father    • Stroke Father    • Lung cancer Father    • Heart attack Sister    • Heart attack Brother    • Diabetes Maternal Grandmother    • Diabetes Paternal Grandmother    • Heart attack Brother    • Heart attack Brother    • Heart attack Sister    • Heart attack Sister    • Ovarian cancer Sister        Review of Systems  "  Constitutional: Negative.    HENT: Negative.    Eyes: Negative.    Respiratory: Negative.    Cardiovascular: Negative.    Gastrointestinal: Positive for abdominal pain.   Endocrine: Negative.    Genitourinary: Positive for pelvic pain. Negative for dysuria and urgency.   Musculoskeletal: Negative.    Skin: Negative.    Allergic/Immunologic: Negative.    Neurological: Negative.    Hematological: Negative.    Psychiatric/Behavioral: Negative.        Patient reports that she is not currently experiencing any symptoms of urinary incontinence.      yesTESTED FOR CHLAMYDIA?    EXAM:  /80   Ht 168.9 cm (66.5\")   Wt 89.4 kg (197 lb)   LMP  (LMP Unknown)   Breastfeeding No   BMI 31.32 kg/m²     Labs:   Lab Results (last 24 hours)     Procedure Component Value Units Date/Time    POC Urinalysis Dipstick [[id number]]  (Normal) Collected: 04/05/22 1015    Specimen: Urine Updated: 04/05/22 1015     Color Yellow     Clarity, UA Clear     Glucose, UA Negative mg/dL      Bilirubin Negative     Ketones, UA Negative     Specific Gravity  1.005     Blood, UA Negative     pH, Urine 5.0     Protein, POC Negative mg/dL      Urobilinogen, UA Normal     Leukocytes Negative     Nitrite, UA Negative          Physical Exam  Vitals and nursing note reviewed. Exam conducted with a chaperone present.   Constitutional:       General: She is not in acute distress.     Appearance: She is well-developed. She is not diaphoretic.   HENT:      Head: Normocephalic and atraumatic.      Nose: Nose normal.   Eyes:      Extraocular Movements: Extraocular movements intact.   Cardiovascular:      Rate and Rhythm: Normal rate.   Pulmonary:      Effort: Pulmonary effort is normal.   Chest:   Breasts: Breasts are symmetrical.      Right: Normal. No mass, nipple discharge, skin change, tenderness or axillary adenopathy.      Left: Normal. No mass, nipple discharge, skin change, tenderness or axillary adenopathy.       Abdominal:      General: There is " no distension.      Palpations: Abdomen is soft. There is no mass.      Tenderness: There is no abdominal tenderness. There is no guarding.   Genitourinary:     General: Normal vulva.      Pubic Area: No rash.       Vagina: Vaginal discharge present.      Uterus: Absent.       Adnexa: Right adnexa normal.        Left: Tenderness present.             Comments: Discharge present.  Nu swab done  Cuff wnl  Musculoskeletal:         General: No tenderness or deformity. Normal range of motion.      Cervical back: Normal range of motion.   Lymphadenopathy:      Upper Body:      Right upper body: No axillary adenopathy.      Left upper body: No axillary adenopathy.   Skin:     General: Skin is warm and dry.      Coloration: Skin is not pale.      Findings: No erythema or rash.   Neurological:      Mental Status: She is alert and oriented to person, place, and time.   Psychiatric:         Behavior: Behavior normal.         Thought Content: Thought content normal.         Judgment: Judgment normal.            As part of wellness and prevention, the following topics were discussed with the patient: healthy weight, substance abuse/misuse, mental health, encouraging self breast exam, and other counseling and guidance done:  Nutrition, physical activity, healthy weight, injury prevention, misuse of tobacco, alcohol and drugs, sexual behavior and STDs, contraception, dental health, mental health, immunizations breast cancer screening and exams.    I saw the patient with a face mask, gloves and eye protection  The patient herself was masked.  Social distancing was observed as appropriate. All COVID precautions observed.  Pt encouraged to receive COVID vaccine if she hadn't already done this.     Assessment     1) GYN annual well woman exam.   2) PAP done today? No  3) problems addressed: yes    MAMMOGRAM UP TO DATE IF AGE APPROPRIATE?  No  (if no, pt encouraged to schedule; risks of undiagnosed breast cancer reviewed with pt if she  does not have a mammogram)      Fhx breast cancer? No    Fhx ovarian cancer? Yes: sister    Fhx colon cancer? No    Invitae testing offered? Yes           Plan       Follow up prn or one year.    Pt instructed to call for results of any testing done today and that failure to call if she has not heard from us could result in a bad outcome.  Pt verbalized her understanding.     Diagnoses and all orders for this visit:    1. Pelvic pain (Primary)  -     NuSwab VG+ - Swab, Vagina  -     Genital Mycoplasmas JO ANN, Swab - Swab, Vagina  -     POC Urinalysis Dipstick    2. H/O abdominal hysterectomy-for chronic DUB    3. Pelvic adhesive disease    4. Smoker    5. Well woman exam    6. Screening for condition  -     Mammo Screening Digital Tomosynthesis Bilateral With CAD; Future    7. Vaginal discharge  -     NuSwab VG+ - Swab, Vagina  -     Genital Mycoplasmas JO ANN, Swab - Swab, Vagina        RTO Return in about 1 year (around 4/5/2023) for Annual physical.    I spent 30+ minutes on the separately reported service of evaluation and management of the 753[address] problem. This time is not included in the time used to support the annual exam service also reported today:This time includes time spent by me in the following activities: preparing for the visit, reviewing tests, obtaining and/or reviewing a separately obtained history, performing a medically appropriate examination and/or evaluation, counseling and educating the patient/family/caregiver, ordering medications, tests, or procedures, referring and communicating with other health care professionals, documenting information in the medical record, independently interpreting results and communicating that information with the patient/family/caregiver and care coordination      Joe Hastings MD  [unfilled]  10:18 EDT

## 2022-04-10 LAB
A VAGINAE DNA VAG QL NAA+PROBE: ABNORMAL SCORE
BVAB2 DNA VAG QL NAA+PROBE: ABNORMAL SCORE
C ALBICANS DNA VAG QL NAA+PROBE: POSITIVE
C GLABRATA DNA VAG QL NAA+PROBE: NEGATIVE
C TRACH DNA VAG QL NAA+PROBE: NEGATIVE
M GENITALIUM DNA SPEC QL NAA+PROBE: NEGATIVE
M HOMINIS DNA SPEC QL NAA+PROBE: NEGATIVE
MEGA1 DNA VAG QL NAA+PROBE: ABNORMAL SCORE
N GONORRHOEA DNA VAG QL NAA+PROBE: NEGATIVE
T VAGINALIS DNA VAG QL NAA+PROBE: NEGATIVE
UREAPLASMA DNA SPEC QL NAA+PROBE: POSITIVE

## 2022-04-10 RX ORDER — METRONIDAZOLE 500 MG/1
500 TABLET ORAL 2 TIMES DAILY
Qty: 14 TABLET | Refills: 0 | Status: SHIPPED | OUTPATIENT
Start: 2022-04-10 | End: 2022-04-17

## 2022-04-10 RX ORDER — FLUCONAZOLE 200 MG/1
200 TABLET ORAL ONCE
Qty: 1 TABLET | Refills: 2 | Status: SHIPPED | OUTPATIENT
Start: 2022-04-10 | End: 2022-04-10

## 2022-04-10 RX ORDER — AZITHROMYCIN 250 MG/1
TABLET, FILM COATED ORAL
Qty: 6 TABLET | Refills: 0 | Status: SHIPPED | OUTPATIENT
Start: 2022-04-10 | End: 2022-05-03

## 2022-04-10 NOTE — PROGRESS NOTES
PIP her swab results came back pos for yeast, bv and ureaplasma.  I have erxd her meds.  She should take the z pack, then the flagyl  then the diflucan  in that order.

## 2022-04-26 ENCOUNTER — HOSPITAL ENCOUNTER (OUTPATIENT)
Dept: MAMMOGRAPHY | Facility: HOSPITAL | Age: 42
Discharge: HOME | End: 2022-04-26
Admitting: OBSTETRICS & GYNECOLOGY

## 2022-04-26 ENCOUNTER — OFFICE VISIT (OUTPATIENT)
Dept: OBSTETRICS AND GYNECOLOGY | Facility: CLINIC | Age: 42
End: 2022-04-26

## 2022-04-26 VITALS
WEIGHT: 196 LBS | DIASTOLIC BLOOD PRESSURE: 76 MMHG | SYSTOLIC BLOOD PRESSURE: 114 MMHG | BODY MASS INDEX: 31.5 KG/M2 | HEIGHT: 66 IN

## 2022-04-26 DIAGNOSIS — Z13.9 SCREENING FOR CONDITION: ICD-10-CM

## 2022-04-26 DIAGNOSIS — R10.2 PELVIC PAIN: ICD-10-CM

## 2022-04-26 DIAGNOSIS — F17.200 SMOKER: ICD-10-CM

## 2022-04-26 DIAGNOSIS — Z90.710 H/O ABDOMINAL HYSTERECTOMY: Primary | ICD-10-CM

## 2022-04-26 DIAGNOSIS — N73.6 PELVIC ADHESIVE DISEASE: ICD-10-CM

## 2022-04-26 DIAGNOSIS — N83.202 BILATERAL OVARIAN CYSTS: ICD-10-CM

## 2022-04-26 DIAGNOSIS — N83.201 BILATERAL OVARIAN CYSTS: ICD-10-CM

## 2022-04-26 PROCEDURE — 77063 BREAST TOMOSYNTHESIS BI: CPT

## 2022-04-26 PROCEDURE — 77067 SCR MAMMO BI INCL CAD: CPT

## 2022-04-26 PROCEDURE — 99214 OFFICE O/P EST MOD 30 MIN: CPT | Performed by: OBSTETRICS & GYNECOLOGY

## 2022-04-26 RX ORDER — ERGOCALCIFEROL 1.25 MG/1
50000 CAPSULE ORAL WEEKLY
COMMUNITY
Start: 2022-04-20

## 2022-04-26 RX ORDER — FLUCONAZOLE 200 MG/1
TABLET ORAL
COMMUNITY
Start: 2022-04-11 | End: 2022-05-03

## 2022-04-26 RX ORDER — FOLIC ACID 1 MG/1
1000 TABLET ORAL DAILY
COMMUNITY
Start: 2022-04-20 | End: 2023-01-13

## 2022-04-26 RX ORDER — LORATADINE 10 MG/1
10 TABLET ORAL DAILY
COMMUNITY
Start: 2022-04-18 | End: 2022-05-03

## 2022-04-26 RX ORDER — VARENICLINE TARTRATE 0.5 MG/1
TABLET, FILM COATED ORAL
COMMUNITY
Start: 2022-04-15 | End: 2022-05-03

## 2022-04-26 NOTE — PROGRESS NOTES
"EVALUATION AND MANAGEMENT ENCOUNTER    Alexandra Cantrell  Patient new to examiner? No  New problem to examiner? No  Patient referred? No    -----------------------------------------------------HISTORY---------------------------------------------------    Chief Complaint:   Chief Complaint   Patient presents with   • Follow-up     Pelvic Pain + US       HPI:  Alexandra Cantrell is a 42 y.o. No obstetric history on file. with No LMP recorded (lmp unknown). Patient has had a hysterectomy. here for u/s:Absent uterus, R ov cyst 1.4cm, L ov cyst 2.8cm. .        History of Present Illness     Alexandra Cantrell  reports that she has been smoking cigarettes. She has a 25.00 pack-year smoking history. She has never used smokeless tobacco.. I have educated her on the risk of diseases from using tobacco products such as cancer, COPD and heart disease.     I advised her to quit         ROS:  Review of Systems   Constitutional: Negative.    HENT: Negative.    Eyes: Negative.    Respiratory: Negative.    Cardiovascular: Negative.    Gastrointestinal: Negative.    Endocrine: Negative.    Musculoskeletal: Negative.    Skin: Negative.    Allergic/Immunologic: Negative.    Neurological: Negative.    Hematological: Negative.    Psychiatric/Behavioral: Negative.    :    Patient reports that she is not currently experiencing any symptoms of urinary incontinence.      NOTESTED FOR CHLAMYDIA?  -----------------------------------------------PHYSICAL EXAM----------------------------------------------    Vital Signs: /76   Ht 167.6 cm (65.98\")   Wt 88.9 kg (196 lb)   LMP  (LMP Unknown)   Breastfeeding No   BMI 31.65 kg/m²    Flowsheet Rows    Flowsheet Row First Filed Value   Admission Height 167.6 cm (65.98\") Documented at 04/26/2022 1136   Admission Weight 88.9 kg (196 lb) Documented at 04/26/2022 1136          Physical Exam  Vitals and nursing note reviewed.   Constitutional:       Appearance: She is well-developed.   HENT: "      Head: Normocephalic and atraumatic.   Cardiovascular:      Rate and Rhythm: Normal rate.   Pulmonary:      Effort: Pulmonary effort is normal.   Abdominal:      General: There is no distension.      Palpations: Abdomen is soft. There is no mass.      Tenderness: There is no abdominal tenderness. There is no guarding.   Genitourinary:     Vagina: No vaginal discharge.   Musculoskeletal:         General: No tenderness or deformity. Normal range of motion.      Cervical back: Normal range of motion.   Skin:     General: Skin is warm and dry.      Coloration: Skin is not pale.      Findings: No erythema or rash.   Neurological:      Mental Status: She is alert and oriented to person, place, and time.   Psychiatric:         Behavior: Behavior normal.         Thought Content: Thought content normal.         Judgment: Judgment normal.         I saw the patient with a face mask, gloves and eye protection  The patient herself was masked.  Social distancing was observed as appropriate. All COVID precautions observed.     -----------------------------------------------MEDICAL DECISION MAKING-----------------------------        DATA Review & labs ordered:     1.   Lab Results (last 24 hours)     ** No results found for the last 24 hours. **        2.   Imaging Results (Last 24 Hours)     ** No results found for the last 24 hours. **        3.   ECG/EMG Results (most recent)     None        4. Old records reviewed? Yes  5. Old records ordered?  Yes  6. Labs ordered?: Yes:  urinalysis: CLR  7. Imaging other than ultrasound ordered?: No        Reviewed with other physician? no     8. Ultrasound ordered and reviewed? Yes  9. Diagnoses and/or chronic conditions reviewed:      Diagnoses and all orders for this visit:    1. H/O abdominal hysterectomy-for chronic DUB (Primary)  -     Case Request; Standing  -     COVID PRE-OP / PRE-PROCEDURE SCREENING ORDER (NO ISOLATION) - Swab, Nasopharynx; Future  -     CBC and Differential;  Future  -     Case Request    2. Pelvic adhesive disease  -     Case Request; Standing  -     COVID PRE-OP / PRE-PROCEDURE SCREENING ORDER (NO ISOLATION) - Swab, Nasopharynx; Future  -     CBC and Differential; Future  -     Case Request    3. Smoker    4. Pelvic pain  -     Case Request; Standing  -     COVID PRE-OP / PRE-PROCEDURE SCREENING ORDER (NO ISOLATION) - Swab, Nasopharynx; Future  -     CBC and Differential; Future  -     Case Request    5. Bilateral ovarian cysts  -     Case Request; Standing  -     COVID PRE-OP / PRE-PROCEDURE SCREENING ORDER (NO ISOLATION) - Swab, Nasopharynx; Future  -     CBC and Differential; Future  -     Case Request    Other orders  -     Follow Anesthesia Guidelines / Standing Orders; Future  -     Chlorhexidine Skin Prep; Future        IMPRESSION/PROBLEM:      Chronic pelvic pain.  Bilateral ovarian cysts. Hx hysterectomy, hx extensive pelvic adhesions.    (Established problem/s? Yes, worsening? Yes)    (New Problem/s? No, additional workup planned? Yes)      PLAN:     1. Dx laparoscopy, bilateral salpingoophorectomy, possible exploratory laparotomy  2. RISKS, ALTERNATIVES, COMPLICATIONS OF THE PROCEDURE INCLUDING BUT NOT LIMITED TO:    INTRAOPERATIVE RISKS: INJURY TO INTERNAL AND ADJACENT ORGANS AND STRUCTURES (BOWEL, BLADDER, URETER,BLOOD VESSELS) OR HEMORRHAGE REQUIRING FURTHER SURGERY (LAPAROTOMY),  POSSIBLE NON-DIAGNOSTIC FINDINGS, DISCOVERY OF POSSIBLE MALIGNANCY, INFECTION, AND DEATH;   POSTOP COMPLICATIONS: BLEEDING, INFECTION (REQUIRING POSSIBLE REOPERATION), FAILURE OF GOAL OF SURGERY AND RECURRENCE OF ORIGINAL SYMPTOMS, PNEUMONIA, PULMONARY EMBOLISM, AND DEATH;  WERE EXPLAINED TO THE PT WHO VERBALIZED HER UNDERSTANDING.            Pt instructed to call for results of any testing done today and that failure to call if she has not heard from us could result in a bad outcome.  Pt verbalized her understanding.     RTO Return in about 4 weeks (around 5/24/2022) for postop  check. .  Instructions and precautions given.     @2021@    Joe Hastings MD  12:48 EDT  04/26/22

## 2022-05-03 ENCOUNTER — PRE-ADMISSION TESTING (OUTPATIENT)
Dept: PREADMISSION TESTING | Facility: HOSPITAL | Age: 42
End: 2022-05-03

## 2022-05-03 VITALS
HEART RATE: 64 BPM | HEIGHT: 66 IN | SYSTOLIC BLOOD PRESSURE: 127 MMHG | RESPIRATION RATE: 16 BRPM | BODY MASS INDEX: 32.78 KG/M2 | WEIGHT: 204 LBS | DIASTOLIC BLOOD PRESSURE: 68 MMHG | OXYGEN SATURATION: 97 %

## 2022-05-03 DIAGNOSIS — N83.202 BILATERAL OVARIAN CYSTS: ICD-10-CM

## 2022-05-03 DIAGNOSIS — R10.2 PELVIC PAIN: ICD-10-CM

## 2022-05-03 DIAGNOSIS — N73.6 PELVIC ADHESIVE DISEASE: ICD-10-CM

## 2022-05-03 DIAGNOSIS — Z90.710 H/O ABDOMINAL HYSTERECTOMY: ICD-10-CM

## 2022-05-03 DIAGNOSIS — N83.201 BILATERAL OVARIAN CYSTS: ICD-10-CM

## 2022-05-03 LAB
ANION GAP SERPL CALCULATED.3IONS-SCNC: 10.8 MMOL/L (ref 5–15)
BUN SERPL-MCNC: 14 MG/DL (ref 6–20)
BUN/CREAT SERPL: 15.7 (ref 7–25)
CALCIUM SPEC-SCNC: 9.2 MG/DL (ref 8.6–10.5)
CHLORIDE SERPL-SCNC: 103 MMOL/L (ref 98–107)
CO2 SERPL-SCNC: 24.2 MMOL/L (ref 22–29)
CREAT SERPL-MCNC: 0.89 MG/DL (ref 0.57–1)
EGFRCR SERPLBLD CKD-EPI 2021: 82.1 ML/MIN/1.73
GLUCOSE SERPL-MCNC: 95 MG/DL (ref 65–99)
POTASSIUM SERPL-SCNC: 4.4 MMOL/L (ref 3.5–5.2)
SARS-COV-2 RNA PNL SPEC NAA+PROBE: NOT DETECTED
SODIUM SERPL-SCNC: 138 MMOL/L (ref 136–145)

## 2022-05-03 PROCEDURE — 36415 COLL VENOUS BLD VENIPUNCTURE: CPT

## 2022-05-03 PROCEDURE — C9803 HOPD COVID-19 SPEC COLLECT: HCPCS

## 2022-05-03 PROCEDURE — 80048 BASIC METABOLIC PNL TOTAL CA: CPT | Performed by: OBSTETRICS & GYNECOLOGY

## 2022-05-03 PROCEDURE — 87635 SARS-COV-2 COVID-19 AMP PRB: CPT | Performed by: OBSTETRICS & GYNECOLOGY

## 2022-05-03 PROCEDURE — 85025 COMPLETE CBC W/AUTO DIFF WBC: CPT | Performed by: OBSTETRICS & GYNECOLOGY

## 2022-05-03 NOTE — DISCHARGE INSTRUCTIONS
PRE-ADMISSION TESTING INSTRUCTIONS FOR ADULTS    Take these medications the morning of surgery with a small sip of water: Claritin, Lyrica, use all inhalers       No aspirin, advil, aleve, ibuprofen, naproxen, diet pills, decongestants, or herbal/vitamins for a week prior to surgery.    General Instructions:    DO NOT EAT SOLID FOOD AFTER MIDNIGHT THE NIGHT BEFORE SURGERY. No gum, mints, or hard candy after midnight the night before surgery.  You may drink clear liquids the day of surgery up until 2 hours before your arrival time.  Clear liquids are liquids you can see through. Nothing RED in color.    Plain water    Sports drinks  Sodas     Gelatin (Jell-O)  Fruit juices without pulp such as white grape juice and apple juice  Popsicles that contain no fruit or yogurt  Tea or coffee (no cream or milk added)    It is beneficial for you to have a clear drink that contains carbohydrates just before you leave your house and before your fasting time begins.  We suggest a 20 ounce bottle of Gatorade or Powerade for non-diabetic patients or a 20 ounce bottle of G2 or Powerade Zero for diabetic patients.     Patients who avoid smoking, chewing tobacco and alcohol for 4 weeks prior to surgery have a reduced risk of post-operative complications.  If at all possible, quit smoking as many days before surgery as you can.    Do not smoke, use chewing tobacco or drink alcohol the day of surgery    Bring your C-PAP/ BI-PAP machine if you use one.  Wear clean comfortable clothes and socks.  Do not wear contact lenses, lotion, deodorant, or make-up.  Bring a case for your glasses if applicable. You may brush your teeth the morning of surgery.  You may wear dentures/partials, do not put adhesive/glue on them.    Leave all other jewelry and valuables at home.      Preventing a Surgical Site Infection:    Shower the night before and on the morning of surgery using the chlorhexidine soap you were given.  Use a clean washcloth with the  soap.  Place clean sheets on your bed after showering the night before surgery. Do not use the CHG soap on your hair, face, or private areas. Wash your body gently for five (5) minutes. Do not scrub your skin.  Dry with a clean towel and dress in clean clothing.    Do not shave the surgical area for 10 days-2 weeks prior to surgery  because the razor can irritate skin and make it easier to develop an infection.  Make sure you, your family, and all healthcare providers clean their hands with soap and water or an alcohol based hand  before caring for you or your wound.      Day of surgery:    Your surgeon’s office will advise you of your arrival time for the day of surgery.    Upon arrival, a Pre-op nurse and Anesthesia provider will review your health history, obtain vital signs, and answer questions you may have.  The only belongings needed at this time will be your home medications and if applicable your C-PAP/BI-PAP machine.  If you are staying overnight your family can leave the rest of your belongings in the car and bring them to your room later.  A Pre-op nurse will start an IV and you may receive medication in preparation for surgery, including something to help you relax.  Your family will be able to see you in the Pre-op area.  While you are in surgery your family should notify the waiting room  if they leave the waiting room area and provide a contact phone number.    IF you have any questions, you can call the Pre-Admission Department at [phone] or your surgeon's office.  Notify your surgeon if  you become sick, have a fever, productive cough, or cannot be here the day of surgery    Please be aware that surgery does come with discomfort.  We want to make every effort to control your discomfort so please discuss any uncontrolled symptoms with your nurse.   Your doctor will most likely have prescribed pain medications.      If you are going home after surgery, you will receive  individualized written care instructions before being discharged.  A responsible adult (over the age of 18) must drive you to and from the hospital on the day of your surgery and stay with you for 24 hours after anesthesia.    If you are staying overnight following surgery, you will be transported to your hospital room following the recovery period.  [facility] has all private rooms.    You may receive a survey regarding the care you received. Your feedback is very important and will be used to collect the necessary data to help us to continue to provide excellent care.     Deductibles and co-payments are collected on the day of service. Please be prepared to pay the required co-pay, deductible or deposit on the day of service as defined by your plan.

## 2022-05-03 NOTE — H&P
PREOPERATIVE HISTORY AND PHYSICAL      Patient Care Team:  Tahmina Murray MD as PCP - General (Family Medicine)    Chief complaint: Pelvic pain    Pt is a 42 y.o. No obstetric history on file.  No LMP recorded (lmp unknown). Patient has had a hysterectomy.     HPI:Pt is a 42 y.o. pt who has had a hysterectomy in the past.  Pt has both ovaries and has had chronic pelvic pain with ovarian cysts.  Pt also has a hx of pelvic adhesive disease as demonstrated by a prior laparoscopy. Pt requested lap bso for relief.        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)   • Chronic left shoulder pain   • Vaginal discharge: BV, candida, ureaplasma   • Pelvic pain       PSHx:   Past Surgical History:   Procedure Laterality Date   • APPENDECTOMY     •  SECTION      X3   • CHOLECYSTECTOMY      LAP   • DIAGNOSTIC LAPAROSCOPY N/A 10/25/2018    Procedure: DIAGNOSTIC LAPAROSCOPY with lysis of adhesions;  Surgeon: Joe Hastings MD;  Location: [facility];  Service: Obstetrics/Gynecology   • HYSTERECTOMY      OPEN   • TUBAL ABDOMINAL LIGATION         Social Hx:   Social History     Socioeconomic History   • Marital status:    Tobacco Use   • Smoking status: Current Every Day Smoker     Packs/day: 1.00     Years: 25.00     Pack years: 25.00     Types: Cigarettes   • Smokeless tobacco: Never Used   Vaping Use   • Vaping Use: Never used    Substance and Sexual Activity   • Alcohol use: No   • Drug use: No   • Sexual activity: Defer       FHx:   Family History   Problem Relation Age of Onset   • Heart attack Mother    • Diabetes Mother    • Heart disease Father    • Stroke Father    • Lung cancer Father    • Heart attack Sister    • Heart attack Brother    • Diabetes Maternal Grandmother    • Diabetes Paternal Grandmother    • Heart attack Brother    • Heart attack Brother    • Heart attack Sister    • Heart attack Sister    • Ovarian cancer Sister        Debilities/Disabilities Identified: None    Emotional Behavior: Appropriate    PGyn Hx:  otherwise noncontributory    POBHx:   OB History   No obstetric history on file.       Allergies: Aspirin, Sulfamethoxazole-trimethoprim, and Gabapentin    Medications:   No medications prior to admission.                            No current facility-administered medications for this encounter.    Current Outpatient Medications:   •  albuterol (PROAIR RESPICLICK) 108 (90 Base) MCG/ACT inhaler, Inhale 2 puffs Every 4 (Four) Hours As Needed for Wheezing (asthma)., Disp: 1 inhaler, Rfl: 11  •  Allergy Relief 10 MG tablet, Take 10 mg by mouth Daily., Disp: , Rfl:   •  azithromycin (Zithromax Z-Paco) 250 MG tablet, Daily., Disp: , Rfl:   •  azithromycin (Zithromax Z-Paco) 250 MG tablet, Take 2 tablets by mouth on day 1, then 1 tablet daily on days 2-5, Disp: 6 tablet, Rfl: 0  •  dicyclomine (BENTYL) 20 MG tablet, Take 1 tablet by mouth Every 6 (Six) Hours., Disp: 20 tablet, Rfl: 0  •  fluconazole (DIFLUCAN) 200 MG tablet, TAKE 1 TABLET BY MOUTH ONE TIME FOR 1 DOSE, REPEAT IN 3 DAYS, Disp: , Rfl:   •  folic acid (FOLVITE) 1 MG tablet, Take 1,000 mcg by mouth Daily., Disp: , Rfl:   •  ibuprofen (ADVIL,MOTRIN) 800 MG tablet, Take 1 tablet by mouth Every 8 (Eight) Hours As Needed for Mild Pain ., Disp: 30 tablet, Rfl: 0  •  Loratadine 10 MG capsule, Daily., Disp: , Rfl:   •  OLANZapine (zyPREXA) 5 MG tablet, Daily.,  Disp: , Rfl:   •  ondansetron ODT (ZOFRAN-ODT) 4 MG disintegrating tablet, Place 1 tablet on the tongue Every 4 (Four) Hours As Needed for Nausea or Vomiting., Disp: 9 tablet, Rfl: 0  •  pregabalin (Lyrica) 75 MG capsule, Take 1 capsule by mouth 2 (Two) Times a Day., Disp: 60 capsule, Rfl: 0  •  pregabalin (LYRICA) 75 MG capsule, Take 1 capsule by mouth 2 (Two) Times a Day., Disp: , Rfl:   •  ProAir  (90 Base) MCG/ACT inhaler, INHALE 1 PUFF BY MOUTH EVERY 4 HOURS AS NEEDED, Disp: , Rfl:   •  Symbicort 160-4.5 MCG/ACT inhaler, Inhale 2 puffs 2 (Two) Times a Day., Disp: , Rfl:   •  tiZANidine (ZANAFLEX) 4 MG tablet, Take 4 mg by mouth 3 (Three) Times a Day., Disp: , Rfl:   •  varenicline (CHANTIX) 0.5 MG tablet, TAKE ONE TABLET BY MOUTH TWICE DAILY AFTER EATING WITH A FULL GLASS OF WATER, Disp: , Rfl:   •  vitamin D (ERGOCALCIFEROL) 1.25 MG (46168 UT) capsule capsule, Take 50,000 Units by mouth 1 (One) Time Per Week., Disp: , Rfl:         Review of Systems   Constitutional: Negative.    HENT: Negative.    Eyes: Negative.    Respiratory: Negative.    Cardiovascular: Negative.    Gastrointestinal: Negative.    Endocrine: Negative.    Genitourinary: Positive for pelvic pain.   Musculoskeletal: Negative.    Skin: Negative.    Allergic/Immunologic: Negative.    Neurological: Negative.    Hematological: Negative.    Psychiatric/Behavioral: Negative.        Vital Signs  LMP  (LMP Unknown)     Physical Exam  Vitals and nursing note reviewed.   Constitutional:       Appearance: She is well-developed.   HENT:      Head: Normocephalic and atraumatic.   Cardiovascular:      Rate and Rhythm: Normal rate.   Pulmonary:      Effort: Pulmonary effort is normal.   Abdominal:      General: There is no distension.      Palpations: Abdomen is soft. There is no mass.      Tenderness: There is no abdominal tenderness. There is no guarding.   Genitourinary:     Vagina: No vaginal discharge.   Musculoskeletal:         General: No  tenderness or deformity. Normal range of motion.      Cervical back: Normal range of motion.   Skin:     General: Skin is warm and dry.      Coloration: Skin is not pale.      Findings: No erythema or rash.   Neurological:      Mental Status: She is alert and oriented to person, place, and time.   Psychiatric:         Behavior: Behavior normal.         Thought Content: Thought content normal.         Judgment: Judgment normal.             IMPRESSION:    Pelvic pain                                    PLAN:    Procedure(s):  DIAGNOSTIC LAPAROSCOPY, LAPAROSCOPIC BILATERAL SALPINGOOPHORECTOMY, POSSIBLE EXPLORATORY LAPAROTOMY    RISKS, ALTERNATIVES, COMPLICATIONS OF THE PROCEDURE INCLUDING BUT NOT LIMITED TO:    INTRAOPERATIVE RISKS: INJURY TO INTERNAL AND ADJACENT ORGANS AND STRUCTURES (BOWEL, BLADDER, URETER,BLOOD VESSELS) OR HEMORRHAGE REQUIRING FURTHER SURGERY (LAPAROTOMY),  POSSIBLE NON-DIAGNOSTIC FINDINGS, DISCOVERY OF POSSIBLE MALIGNANCY, INFECTION, AND DEATH;   POSTOP COMPLICATIONS: BLEEDING, INFECTION (REQUIRING POSSIBLE REOPERATION), FAILURE OF GOAL OF SURGERY AND RECURRENCE OF ORIGINAL SYMPTOMS, PNEUMONIA, PULMONARY EMBOLISM, AND DEATH;  WERE EXPLAINED TO THE PT WHO VERBALIZED HER UNDERSTANDING.             I discussed the patients findings and my recommendations with patient and family.     Joe Hastings MD  05/03/22  07:14 EDT

## 2022-05-04 ENCOUNTER — HOSPITAL ENCOUNTER (OUTPATIENT)
Dept: GENERAL RADIOLOGY | Facility: HOSPITAL | Age: 42
Discharge: HOME | End: 2022-05-04
Admitting: FAMILY MEDICINE

## 2022-05-04 ENCOUNTER — TRANSCRIBE ORDERS (OUTPATIENT)
Dept: GENERAL RADIOLOGY | Facility: HOSPITAL | Age: 42
End: 2022-05-04

## 2022-05-04 ENCOUNTER — ANESTHESIA EVENT (OUTPATIENT)
Dept: PERIOP | Facility: HOSPITAL | Age: 42
End: 2022-05-04

## 2022-05-04 DIAGNOSIS — R07.9 CHEST PAIN, UNSPECIFIED TYPE: ICD-10-CM

## 2022-05-04 DIAGNOSIS — R07.9 CHEST PAIN, UNSPECIFIED TYPE: Primary | ICD-10-CM

## 2022-05-04 PROCEDURE — 71100 X-RAY EXAM RIBS UNI 2 VIEWS: CPT

## 2022-05-04 PROCEDURE — 71046 X-RAY EXAM CHEST 2 VIEWS: CPT

## 2022-05-05 ENCOUNTER — HOSPITAL ENCOUNTER (OUTPATIENT)
Facility: HOSPITAL | Age: 42
Discharge: HOME | End: 2022-05-06
Attending: OBSTETRICS & GYNECOLOGY | Admitting: OBSTETRICS & GYNECOLOGY

## 2022-05-05 ENCOUNTER — ANESTHESIA (OUTPATIENT)
Dept: PERIOP | Facility: HOSPITAL | Age: 42
End: 2022-05-05

## 2022-05-05 DIAGNOSIS — Z90.710 H/O ABDOMINAL HYSTERECTOMY: ICD-10-CM

## 2022-05-05 DIAGNOSIS — N83.202 BILATERAL OVARIAN CYSTS: ICD-10-CM

## 2022-05-05 DIAGNOSIS — Z90.722 STATUS POST BILATERAL SALPINGO-OOPHORECTOMY: Primary | ICD-10-CM

## 2022-05-05 DIAGNOSIS — N73.6 PELVIC ADHESIVE DISEASE: ICD-10-CM

## 2022-05-05 DIAGNOSIS — R10.2 PELVIC PAIN: ICD-10-CM

## 2022-05-05 DIAGNOSIS — N83.201 BILATERAL OVARIAN CYSTS: ICD-10-CM

## 2022-05-05 PROCEDURE — 25010000002 CEFOXITIN PER 1 G: Performed by: OBSTETRICS & GYNECOLOGY

## 2022-05-05 PROCEDURE — 25010000002 MIDAZOLAM PER 1MG: Performed by: NURSE ANESTHETIST, CERTIFIED REGISTERED

## 2022-05-05 PROCEDURE — 25010000002 HYDROMORPHONE 1 MG/ML SOLUTION: Performed by: NURSE ANESTHETIST, CERTIFIED REGISTERED

## 2022-05-05 PROCEDURE — 58661 LAPAROSCOPY REMOVE ADNEXA: CPT | Performed by: SPECIALIST/TECHNOLOGIST, OTHER

## 2022-05-05 PROCEDURE — 44005 FREEING OF BOWEL ADHESION: CPT | Performed by: SPECIALIST/TECHNOLOGIST, OTHER

## 2022-05-05 PROCEDURE — G0378 HOSPITAL OBSERVATION PER HR: HCPCS

## 2022-05-05 PROCEDURE — 25010000002 ONDANSETRON PER 1 MG: Performed by: NURSE ANESTHETIST, CERTIFIED REGISTERED

## 2022-05-05 PROCEDURE — 25010000002 FENTANYL CITRATE (PF) 50 MCG/ML SOLUTION: Performed by: NURSE ANESTHETIST, CERTIFIED REGISTERED

## 2022-05-05 PROCEDURE — 25010000002 SUCCINYLCHOLINE PER 20 MG: Performed by: NURSE ANESTHETIST, CERTIFIED REGISTERED

## 2022-05-05 PROCEDURE — 25010000002 DIPHENHYDRAMINE PER 50 MG: Performed by: NURSE ANESTHETIST, CERTIFIED REGISTERED

## 2022-05-05 PROCEDURE — 25010000002 NEOSTIGMINE 10 MG/10ML SOLUTION: Performed by: NURSE ANESTHETIST, CERTIFIED REGISTERED

## 2022-05-05 PROCEDURE — 58661 LAPAROSCOPY REMOVE ADNEXA: CPT | Performed by: OBSTETRICS & GYNECOLOGY

## 2022-05-05 PROCEDURE — 88305 TISSUE EXAM BY PATHOLOGIST: CPT | Performed by: OBSTETRICS & GYNECOLOGY

## 2022-05-05 PROCEDURE — 25010000002 KETOROLAC TROMETHAMINE PER 15 MG: Performed by: NURSE ANESTHETIST, CERTIFIED REGISTERED

## 2022-05-05 PROCEDURE — 25010000002 DEXAMETHASONE PER 1 MG: Performed by: NURSE ANESTHETIST, CERTIFIED REGISTERED

## 2022-05-05 PROCEDURE — 44005 FREEING OF BOWEL ADHESION: CPT | Performed by: SURGERY

## 2022-05-05 PROCEDURE — 25010000002 PROPOFOL 10 MG/ML EMULSION: Performed by: NURSE ANESTHETIST, CERTIFIED REGISTERED

## 2022-05-05 RX ORDER — PREGABALIN 75 MG/1
75 CAPSULE ORAL 2 TIMES DAILY
Status: DISCONTINUED | OUTPATIENT
Start: 2022-05-05 | End: 2022-05-06 | Stop reason: HOSPADM

## 2022-05-05 RX ORDER — ONDANSETRON 2 MG/ML
4 INJECTION INTRAMUSCULAR; INTRAVENOUS EVERY 6 HOURS PRN
Status: DISCONTINUED | OUTPATIENT
Start: 2022-05-05 | End: 2022-05-06 | Stop reason: HOSPADM

## 2022-05-05 RX ORDER — OXYCODONE HYDROCHLORIDE AND ACETAMINOPHEN 5; 325 MG/1; MG/1
2 TABLET ORAL EVERY 4 HOURS PRN
Status: DISCONTINUED | OUTPATIENT
Start: 2022-05-05 | End: 2022-05-05

## 2022-05-05 RX ORDER — HYDROCODONE BITARTRATE AND ACETAMINOPHEN 7.5; 325 MG/1; MG/1
2 TABLET ORAL EVERY 4 HOURS PRN
Status: DISCONTINUED | OUTPATIENT
Start: 2022-05-05 | End: 2022-05-06 | Stop reason: HOSPADM

## 2022-05-05 RX ORDER — SODIUM CHLORIDE 0.9 % (FLUSH) 0.9 %
10 SYRINGE (ML) INJECTION EVERY 12 HOURS SCHEDULED
Status: DISCONTINUED | OUTPATIENT
Start: 2022-05-05 | End: 2022-05-05 | Stop reason: HOSPADM

## 2022-05-05 RX ORDER — OXYCODONE AND ACETAMINOPHEN 7.5; 325 MG/1; MG/1
1 TABLET ORAL ONCE AS NEEDED
Status: DISCONTINUED | OUTPATIENT
Start: 2022-05-05 | End: 2022-05-05 | Stop reason: HOSPADM

## 2022-05-05 RX ORDER — LIDOCAINE HYDROCHLORIDE 10 MG/ML
0.5 INJECTION, SOLUTION EPIDURAL; INFILTRATION; INTRACAUDAL; PERINEURAL ONCE AS NEEDED
Status: DISCONTINUED | OUTPATIENT
Start: 2022-05-05 | End: 2022-05-05 | Stop reason: HOSPADM

## 2022-05-05 RX ORDER — PROPOFOL 10 MG/ML
VIAL (ML) INTRAVENOUS AS NEEDED
Status: DISCONTINUED | OUTPATIENT
Start: 2022-05-05 | End: 2022-05-05 | Stop reason: SURG

## 2022-05-05 RX ORDER — POLYETHYLENE GLYCOL 3350 17 G/17G
17 POWDER, FOR SOLUTION ORAL DAILY PRN
Status: DISCONTINUED | OUTPATIENT
Start: 2022-05-05 | End: 2022-05-06 | Stop reason: HOSPADM

## 2022-05-05 RX ORDER — LIDOCAINE HYDROCHLORIDE 20 MG/ML
INJECTION, SOLUTION INFILTRATION; PERINEURAL AS NEEDED
Status: DISCONTINUED | OUTPATIENT
Start: 2022-05-05 | End: 2022-05-05 | Stop reason: SURG

## 2022-05-05 RX ORDER — SODIUM CHLORIDE, SODIUM LACTATE, POTASSIUM CHLORIDE, CALCIUM CHLORIDE 600; 310; 30; 20 MG/100ML; MG/100ML; MG/100ML; MG/100ML
9 INJECTION, SOLUTION INTRAVENOUS CONTINUOUS PRN
Status: DISCONTINUED | OUTPATIENT
Start: 2022-05-05 | End: 2022-05-06 | Stop reason: HOSPADM

## 2022-05-05 RX ORDER — KETOROLAC TROMETHAMINE 30 MG/ML
INJECTION, SOLUTION INTRAMUSCULAR; INTRAVENOUS AS NEEDED
Status: DISCONTINUED | OUTPATIENT
Start: 2022-05-05 | End: 2022-05-05 | Stop reason: SURG

## 2022-05-05 RX ORDER — NEOSTIGMINE METHYLSULFATE 1 MG/ML
INJECTION, SOLUTION INTRAVENOUS AS NEEDED
Status: DISCONTINUED | OUTPATIENT
Start: 2022-05-05 | End: 2022-05-05 | Stop reason: SURG

## 2022-05-05 RX ORDER — FENTANYL CITRATE 50 UG/ML
INJECTION, SOLUTION INTRAMUSCULAR; INTRAVENOUS AS NEEDED
Status: DISCONTINUED | OUTPATIENT
Start: 2022-05-05 | End: 2022-05-05 | Stop reason: SURG

## 2022-05-05 RX ORDER — ALBUTEROL SULFATE 90 UG/1
2 AEROSOL, METERED RESPIRATORY (INHALATION) EVERY 4 HOURS PRN
Status: DISCONTINUED | OUTPATIENT
Start: 2022-05-05 | End: 2022-05-06 | Stop reason: HOSPADM

## 2022-05-05 RX ORDER — NICOTINE 21 MG/24HR
1 PATCH, TRANSDERMAL 24 HOURS TRANSDERMAL
Status: DISCONTINUED | OUTPATIENT
Start: 2022-05-05 | End: 2022-05-06 | Stop reason: HOSPADM

## 2022-05-05 RX ORDER — DEXAMETHASONE SODIUM PHOSPHATE 4 MG/ML
8 INJECTION, SOLUTION INTRA-ARTICULAR; INTRALESIONAL; INTRAMUSCULAR; INTRAVENOUS; SOFT TISSUE ONCE AS NEEDED
Status: COMPLETED | OUTPATIENT
Start: 2022-05-05 | End: 2022-05-05

## 2022-05-05 RX ORDER — DEXMEDETOMIDINE HYDROCHLORIDE 100 UG/ML
INJECTION, SOLUTION INTRAVENOUS AS NEEDED
Status: DISCONTINUED | OUTPATIENT
Start: 2022-05-05 | End: 2022-05-05 | Stop reason: SURG

## 2022-05-05 RX ORDER — NICOTINE 21 MG/24HR
PATCH, TRANSDERMAL 24 HOURS TRANSDERMAL
Status: DISCONTINUED
Start: 2022-05-05 | End: 2022-05-06 | Stop reason: HOSPADM

## 2022-05-05 RX ORDER — DIPHENHYDRAMINE HYDROCHLORIDE 50 MG/ML
INJECTION INTRAMUSCULAR; INTRAVENOUS AS NEEDED
Status: DISCONTINUED | OUTPATIENT
Start: 2022-05-05 | End: 2022-05-05 | Stop reason: SURG

## 2022-05-05 RX ORDER — DOCUSATE SODIUM 100 MG/1
100 CAPSULE, LIQUID FILLED ORAL 2 TIMES DAILY
Status: DISCONTINUED | OUTPATIENT
Start: 2022-05-05 | End: 2022-05-06 | Stop reason: HOSPADM

## 2022-05-05 RX ORDER — ROCURONIUM BROMIDE 10 MG/ML
INJECTION, SOLUTION INTRAVENOUS AS NEEDED
Status: DISCONTINUED | OUTPATIENT
Start: 2022-05-05 | End: 2022-05-05 | Stop reason: SURG

## 2022-05-05 RX ORDER — FAMOTIDINE 10 MG/ML
20 INJECTION, SOLUTION INTRAVENOUS
Status: COMPLETED | OUTPATIENT
Start: 2022-05-05 | End: 2022-05-05

## 2022-05-05 RX ORDER — GLYCOPYRROLATE 0.2 MG/ML
INJECTION INTRAMUSCULAR; INTRAVENOUS AS NEEDED
Status: DISCONTINUED | OUTPATIENT
Start: 2022-05-05 | End: 2022-05-05 | Stop reason: SURG

## 2022-05-05 RX ORDER — SODIUM CHLORIDE, SODIUM LACTATE, POTASSIUM CHLORIDE, CALCIUM CHLORIDE 600; 310; 30; 20 MG/100ML; MG/100ML; MG/100ML; MG/100ML
150 INJECTION, SOLUTION INTRAVENOUS CONTINUOUS
Status: DISCONTINUED | OUTPATIENT
Start: 2022-05-05 | End: 2022-05-06 | Stop reason: HOSPADM

## 2022-05-05 RX ORDER — MIDAZOLAM HYDROCHLORIDE 2 MG/2ML
1 INJECTION, SOLUTION INTRAMUSCULAR; INTRAVENOUS
Status: DISCONTINUED | OUTPATIENT
Start: 2022-05-05 | End: 2022-05-05 | Stop reason: HOSPADM

## 2022-05-05 RX ORDER — SUCCINYLCHOLINE CHLORIDE 20 MG/ML
INJECTION INTRAMUSCULAR; INTRAVENOUS AS NEEDED
Status: DISCONTINUED | OUTPATIENT
Start: 2022-05-05 | End: 2022-05-05 | Stop reason: SURG

## 2022-05-05 RX ORDER — MAGNESIUM HYDROXIDE 1200 MG/15ML
LIQUID ORAL AS NEEDED
Status: DISCONTINUED | OUTPATIENT
Start: 2022-05-05 | End: 2022-05-05 | Stop reason: HOSPADM

## 2022-05-05 RX ORDER — KETAMINE HYDROCHLORIDE 10 MG/ML
INJECTION INTRAMUSCULAR; INTRAVENOUS AS NEEDED
Status: DISCONTINUED | OUTPATIENT
Start: 2022-05-05 | End: 2022-05-05 | Stop reason: SURG

## 2022-05-05 RX ORDER — SODIUM CHLORIDE, SODIUM LACTATE, POTASSIUM CHLORIDE, CALCIUM CHLORIDE 600; 310; 30; 20 MG/100ML; MG/100ML; MG/100ML; MG/100ML
100 INJECTION, SOLUTION INTRAVENOUS CONTINUOUS
Status: DISCONTINUED | OUTPATIENT
Start: 2022-05-05 | End: 2022-05-05

## 2022-05-05 RX ORDER — ONDANSETRON 2 MG/ML
4 INJECTION INTRAMUSCULAR; INTRAVENOUS ONCE AS NEEDED
Status: DISCONTINUED | OUTPATIENT
Start: 2022-05-05 | End: 2022-05-05 | Stop reason: HOSPADM

## 2022-05-05 RX ORDER — SODIUM CHLORIDE 9 MG/ML
40 INJECTION, SOLUTION INTRAVENOUS AS NEEDED
Status: DISCONTINUED | OUTPATIENT
Start: 2022-05-05 | End: 2022-05-05 | Stop reason: HOSPADM

## 2022-05-05 RX ORDER — OXYCODONE HYDROCHLORIDE AND ACETAMINOPHEN 5; 325 MG/1; MG/1
1 TABLET ORAL ONCE AS NEEDED
Status: DISCONTINUED | OUTPATIENT
Start: 2022-05-05 | End: 2022-05-05 | Stop reason: HOSPADM

## 2022-05-05 RX ORDER — ONDANSETRON 2 MG/ML
4 INJECTION INTRAMUSCULAR; INTRAVENOUS ONCE AS NEEDED
Status: COMPLETED | OUTPATIENT
Start: 2022-05-05 | End: 2022-05-05

## 2022-05-05 RX ORDER — ONDANSETRON 4 MG/1
4 TABLET, FILM COATED ORAL EVERY 6 HOURS PRN
Status: DISCONTINUED | OUTPATIENT
Start: 2022-05-05 | End: 2022-05-06 | Stop reason: HOSPADM

## 2022-05-05 RX ORDER — SODIUM CHLORIDE 0.9 % (FLUSH) 0.9 %
10 SYRINGE (ML) INJECTION AS NEEDED
Status: DISCONTINUED | OUTPATIENT
Start: 2022-05-05 | End: 2022-05-05 | Stop reason: HOSPADM

## 2022-05-05 RX ORDER — IBUPROFEN 800 MG/1
800 TABLET ORAL EVERY 8 HOURS PRN
Status: DISCONTINUED | OUTPATIENT
Start: 2022-05-05 | End: 2022-05-06 | Stop reason: HOSPADM

## 2022-05-05 RX ADMIN — Medication 1 PATCH: at 14:38

## 2022-05-05 RX ADMIN — ROCURONIUM BROMIDE 5 MG: 10 INJECTION INTRAVENOUS at 09:57

## 2022-05-05 RX ADMIN — FENTANYL CITRATE 25 MCG: 50 INJECTION INTRAMUSCULAR; INTRAVENOUS at 11:03

## 2022-05-05 RX ADMIN — KETOROLAC TROMETHAMINE 30 MG: 30 INJECTION, SOLUTION INTRAMUSCULAR; INTRAVENOUS at 11:34

## 2022-05-05 RX ADMIN — HYDROCODONE BITARTRATE AND ACETAMINOPHEN 2 TABLET: 7.5; 325 TABLET ORAL at 14:41

## 2022-05-05 RX ADMIN — HYDROMORPHONE HYDROCHLORIDE 0.5 MG: 1 INJECTION, SOLUTION INTRAMUSCULAR; INTRAVENOUS; SUBCUTANEOUS at 12:24

## 2022-05-05 RX ADMIN — IBUPROFEN 800 MG: 800 TABLET, FILM COATED ORAL at 18:57

## 2022-05-05 RX ADMIN — DEXAMETHASONE SODIUM PHOSPHATE 8 MG: 4 INJECTION, SOLUTION INTRAMUSCULAR; INTRAVENOUS at 08:47

## 2022-05-05 RX ADMIN — HYDROCODONE BITARTRATE AND ACETAMINOPHEN 2 TABLET: 7.5; 325 TABLET ORAL at 18:57

## 2022-05-05 RX ADMIN — CEFOXITIN 2 G: 1 INJECTION, POWDER, FOR SOLUTION INTRAVENOUS at 10:01

## 2022-05-05 RX ADMIN — LIDOCAINE HYDROCHLORIDE 100 MG: 20 INJECTION, SOLUTION INFILTRATION; PERINEURAL at 09:57

## 2022-05-05 RX ADMIN — SODIUM CHLORIDE, POTASSIUM CHLORIDE, SODIUM LACTATE AND CALCIUM CHLORIDE 150 ML/HR: 600; 310; 30; 20 INJECTION, SOLUTION INTRAVENOUS at 12:50

## 2022-05-05 RX ADMIN — ROCURONIUM BROMIDE 5 MG: 10 INJECTION INTRAVENOUS at 11:18

## 2022-05-05 RX ADMIN — KETAMINE HYDROCHLORIDE 10 MG: 10 INJECTION, SOLUTION INTRAMUSCULAR; INTRAVENOUS at 10:32

## 2022-05-05 RX ADMIN — MIDAZOLAM HYDROCHLORIDE 1 MG: 1 INJECTION, SOLUTION INTRAMUSCULAR; INTRAVENOUS at 09:30

## 2022-05-05 RX ADMIN — FENTANYL CITRATE 25 MCG: 50 INJECTION INTRAMUSCULAR; INTRAVENOUS at 10:16

## 2022-05-05 RX ADMIN — DEXMEDETOMIDINE 4 MCG: 100 INJECTION, SOLUTION, CONCENTRATE INTRAVENOUS at 09:53

## 2022-05-05 RX ADMIN — DEXMEDETOMIDINE 4 MCG: 100 INJECTION, SOLUTION, CONCENTRATE INTRAVENOUS at 10:32

## 2022-05-05 RX ADMIN — ONDANSETRON 4 MG: 2 INJECTION INTRAMUSCULAR; INTRAVENOUS at 08:47

## 2022-05-05 RX ADMIN — DIPHENHYDRAMINE HYDROCHLORIDE 12.5 MG: 50 INJECTION, SOLUTION INTRAMUSCULAR; INTRAVENOUS at 11:39

## 2022-05-05 RX ADMIN — FAMOTIDINE 20 MG: 10 INJECTION, SOLUTION INTRAVENOUS at 08:47

## 2022-05-05 RX ADMIN — ROCURONIUM BROMIDE 10 MG: 10 INJECTION INTRAVENOUS at 10:50

## 2022-05-05 RX ADMIN — ROCURONIUM BROMIDE 25 MG: 10 INJECTION INTRAVENOUS at 10:00

## 2022-05-05 RX ADMIN — SODIUM CHLORIDE, POTASSIUM CHLORIDE, SODIUM LACTATE AND CALCIUM CHLORIDE 9 ML/HR: 600; 310; 30; 20 INJECTION, SOLUTION INTRAVENOUS at 08:48

## 2022-05-05 RX ADMIN — HYDROMORPHONE HYDROCHLORIDE 0.5 MG: 1 INJECTION, SOLUTION INTRAMUSCULAR; INTRAVENOUS; SUBCUTANEOUS at 12:15

## 2022-05-05 RX ADMIN — PROPOFOL 150 MG: 10 INJECTION, EMULSION INTRAVENOUS at 09:57

## 2022-05-05 RX ADMIN — DOCUSATE SODIUM 100 MG: 100 CAPSULE, LIQUID FILLED ORAL at 21:06

## 2022-05-05 RX ADMIN — KETAMINE HYDROCHLORIDE 20 MG: 10 INJECTION, SOLUTION INTRAMUSCULAR; INTRAVENOUS at 09:57

## 2022-05-05 RX ADMIN — NEOSTIGMINE METHYLSULFATE 4 MG: 1 INJECTION INTRAVENOUS at 11:29

## 2022-05-05 RX ADMIN — GLYCOPYRROLATE 0.4 MG: 0.2 INJECTION INTRAMUSCULAR; INTRAVENOUS at 11:29

## 2022-05-05 RX ADMIN — PREGABALIN 75 MG: 75 CAPSULE ORAL at 21:06

## 2022-05-05 RX ADMIN — SUCCINYLCHOLINE CHLORIDE 80 MG: 20 INJECTION, SOLUTION INTRAMUSCULAR; INTRAVENOUS at 09:57

## 2022-05-05 NOTE — OP NOTE
GENERAL SURGERY :  Bobbi  Alexandra Latonya Óscar  1980    Procedure Date: 05/05/22    Pre-op Diagnosis: Pelvic adhesions    Post-op Diagnosis: Pelvic adhesions    Procedure: Lysis of adhesions    Surgeon: Bobbi    Assistant: Nico Flores-a certified first assistant was necessary for camera operation and retraction    Estimated Blood Loss:  10 ml    Complications: None    Specimen: Bilateral ovaries    Findings: I was called in to lyse omental adhesions in the pelvis.  Alexandra was having a bilateral oophorectomy by Dr. Hastings.  During his surgery he encountered significant omental adhesions to the left ovary and pelvis and I was called in to take down these adhesions.  The right ovary had already been removed and placed in an Endo Catch bag.  The left ovary was adhesed to the pelvic sidewall with dense omental adhesions surrounding it.    Clinical Note: Alexandra is a 42-year-old female who was having a laparoscopic oophorectomy by Dr. Hastings.  She had dense omental adhesions to the pelvis and I was called into the OR to take these down.    Procedure: Alexandra already had her ports placed and her right ovary removed by the time I entered the operating room.  Dr. Hastings will dictate that part of the procedure in a separate note.  On inspection of the pelvis there were dense omental adhesions to the pelvic sidewall as well as left ovary.  Using the Enseal I was able to take down these adhesions.  I also had to separate the adhesions from the peritoneum.  This was done using the Enseal as well as blunt dissection.  Once the adhesions were taken down the left ovary was able to be removed using the Enseal device.  Copious irrigation was then carried out.  There was no bleeding noted.  The omentum was inspected.  There were no defects in the omentum.  All of the bowel appeared to be without injury.  The irrigation was aspirated out of the pelvis.  The ovaries were removed.  Air was deflated from the  pelvis under direct vision.  The ports were then removed.  I then left the room and Dr. Hastings completed the case.  Alexandra appeared to tolerate the procedure well without complication.        Maggie Martines DO  11:31 EDT

## 2022-05-05 NOTE — NURSING NOTE
Pt educated on use of continuous capnography after anesthesia.  Pt refuses capnography at this time but agreeable to continuous sat monitoring.  Sats 98% at this time on room air.

## 2022-05-05 NOTE — INTERVAL H&P NOTE
"H&P reviewed. The patient was examined and there are no changes to the H&P.    Temp 98.3 °F (36.8 °C) (Oral)   Ht 166.4 cm (65.5\")   Wt 90 kg (198 lb 6.4 oz)   LMP  (LMP Unknown)   BMI 32.51 kg/m²     Medications Prior to Admission   Medication Sig Dispense Refill Last Dose    albuterol (PROAIR RESPICLICK) 108 (90 Base) MCG/ACT inhaler Inhale 2 puffs Every 4 (Four) Hours As Needed for Wheezing (asthma). 1 inhaler 11 5/5/2022 at 0530    folic acid (FOLVITE) 1 MG tablet Take 1,000 mcg by mouth Daily.   Past Week at Unknown time    ibuprofen (ADVIL,MOTRIN) 800 MG tablet Take 1 tablet by mouth Every 8 (Eight) Hours As Needed for Mild Pain . 30 tablet 0 Past Week at Unknown time    Loratadine 10 MG capsule 10 mg Daily.   5/5/2022 at 0530    pregabalin (Lyrica) 75 MG capsule Take 1 capsule by mouth 2 (Two) Times a Day. 60 capsule 0 5/5/2022 at 0530    Symbicort 160-4.5 MCG/ACT inhaler Inhale 2 puffs 2 (Two) Times a Day.   5/5/2022 at 0530    tiZANidine (ZANAFLEX) 4 MG tablet Take 4 mg by mouth 2 (Two) Times a Day.   Past Week at Unknown time    vitamin D (ERGOCALCIFEROL) 1.25 MG (27145 UT) capsule capsule Take 50,000 Units by mouth 1 (One) Time Per Week.   5/4/2022 at Unknown time    ondansetron ODT (ZOFRAN-ODT) 4 MG disintegrating tablet Place 1 tablet on the tongue Every 4 (Four) Hours As Needed for Nausea or Vomiting. 9 tablet 0 Unknown at Unknown time    ProAir  (90 Base) MCG/ACT inhaler INHALE 1 PUFF BY MOUTH EVERY 4 HOURS AS NEEDED   Unknown at Unknown time     "

## 2022-05-05 NOTE — NURSING NOTE
Pt up ad jim walking in room.  Pt alert and oriented and denies pain at this time.  Per pt request and verbal MD orders, pt saline locked, SDCs removed and continuous sat monitor removed.

## 2022-05-05 NOTE — ANESTHESIA PREPROCEDURE EVALUATION
Anesthesia Evaluation     Patient summary reviewed and Nursing notes reviewed   no history of anesthetic complications:  NPO Solid Status: > 8 hours  NPO Liquid Status: > 2 hours           Airway   Mallampati: III  TM distance: >3 FB  Neck ROM: full  No difficulty expected  Dental - normal exam     Pulmonary - normal exam    breath sounds clear to auscultation  (+) a smoker Current Smoked day of surgery, COPD moderate, asthma,  Cardiovascular   Exercise tolerance: good (4-7 METS)    Rhythm: regular  Rate: normal    (+) hyperlipidemia,       Neuro/Psych  (+) psychiatric history Anxiety and Depression,    (-) headaches  GI/Hepatic/Renal/Endo    (+) obesity,       Musculoskeletal     (+) back pain, neck pain,   Abdominal   (+) obese,    Substance History   (+) alcohol use,      OB/GYN negative ob/gyn ROS         Other   arthritis,                      Anesthesia Plan    ASA 3     general     intravenous induction     Anesthetic plan, all risks, benefits, and alternatives have been provided, discussed and informed consent has been obtained with: patient.  Use of blood products discussed with patient  Consented to blood products.       CODE STATUS:

## 2022-05-05 NOTE — PLAN OF CARE
Problem: Adult Inpatient Plan of Care  Goal: Plan of Care Review  Outcome: Ongoing, Progressing  Goal: Patient-Specific Goal (Individualized)  Outcome: Ongoing, Progressing  Goal: Absence of Hospital-Acquired Illness or Injury  Outcome: Ongoing, Progressing  Intervention: Identify and Manage Fall Risk  Recent Flowsheet Documentation  Taken 5/5/2022 1643 by Lesley Morris RN  Safety Promotion/Fall Prevention: safety round/check completed  Taken 5/5/2022 1500 by Lesley Morris RN  Safety Promotion/Fall Prevention: safety round/check completed  Taken 5/5/2022 1308 by Lesley Morris RN  Safety Promotion/Fall Prevention:   safety round/check completed   room organization consistent   nonskid shoes/slippers when out of bed   assistive device/personal items within reach   activity supervised   clutter free environment maintained  Intervention: Prevent Skin Injury  Recent Flowsheet Documentation  Taken 5/5/2022 1308 by Lesley Morris RN  Body Position: sitting up in bed  Intervention: Prevent and Manage VTE (Venous Thromboembolism) Risk  Recent Flowsheet Documentation  Taken 5/5/2022 1308 by Lesley Morris RN  Activity Management: activity adjusted per tolerance  Taken 5/5/2022 1251 by Lesley Morris RN  VTE Prevention/Management:   bilateral   sequential compression devices on  Intervention: Prevent Infection  Recent Flowsheet Documentation  Taken 5/5/2022 1308 by Lesley Morris RN  Infection Prevention:   environmental surveillance performed   cohorting utilized   equipment surfaces disinfected   hand hygiene promoted   personal protective equipment utilized   rest/sleep promoted   single patient room provided   visitors restricted/screened  Goal: Optimal Comfort and Wellbeing  Outcome: Ongoing, Progressing  Intervention: Monitor Pain and Promote Comfort  Recent Flowsheet Documentation  Taken 5/5/2022 1441 by Lesley Morris RN  Pain Management Interventions: see MAR  Taken 5/5/2022 1251  by Alexandra, Lesley, RN  Pain Management Interventions: no interventions per patient request  Intervention: Provide Person-Centered Care  Recent Flowsheet Documentation  Taken 5/5/2022 1251 by Lesley Morris RN  Trust Relationship/Rapport:   care explained   questions answered   questions encouraged   reassurance provided  Goal: Readiness for Transition of Care  Outcome: Ongoing, Progressing     Problem: Pain Acute  Goal: Acceptable Pain Control and Functional Ability  Outcome: Ongoing, Progressing  Intervention: Prevent or Manage Pain  Recent Flowsheet Documentation  Taken 5/5/2022 1308 by Lesley Morris RN  Medication Review/Management: medications reviewed  Intervention: Develop Pain Management Plan  Recent Flowsheet Documentation  Taken 5/5/2022 1441 by Lesley Morris RN  Pain Management Interventions: see MAR  Taken 5/5/2022 1251 by Lesley Morris RN  Pain Management Interventions: no interventions per patient request   Goal Outcome Evaluation:      VSS, incisions WDL, pain well controlled w/ prn pain meds, pt up ad jim and voiding w/o difficulty, should d/c home tomorrow.

## 2022-05-05 NOTE — ANESTHESIA PROCEDURE NOTES
Airway  Urgency: elective    Date/Time: 5/5/2022 9:58 AM  Airway not difficult    General Information and Staff    Patient location during procedure: OR  CRNA/CAA: Gely Merino, KAMRYN    Indications and Patient Condition  Indications for airway management: airway protection    Preoxygenated: yes  MILS maintained throughout  Mask difficulty assessment: 1 - vent by mask    Final Airway Details  Final airway type: endotracheal airway      Successful airway: ETT  Cuffed: yes   Successful intubation technique: direct laryngoscopy  Facilitating devices/methods: intubating stylet  Endotracheal tube insertion site: oral  Blade: Prabhu  ETT size (mm): 7.0  Cormack-Lehane Classification: grade I - full view of glottis  Placement verified by: chest auscultation and capnometry   Cuff volume (mL): 8  Measured from: lips  ETT/EBT  to lips (cm): 20  Number of attempts at approach: 1  Assessment: lips, teeth, and gum same as pre-op and atraumatic intubation

## 2022-05-05 NOTE — ANESTHESIA POSTPROCEDURE EVALUATION
Patient: Alexandra Cantrell    Procedure Summary     Date: 05/05/22 Room / Location: [facility] OR 1 /  [facility] OR    Anesthesia Start: 0951 Anesthesia Stop: 1153    Procedure: DIAGNOSTIC LAPAROTOMY, LAPAROSCOPIC BILATERAL SALPINGOOPHORECTOMY, EXTENSIVE LYSIS OF ADHESIONS (N/A Abdomen) Diagnosis:       H/O abdominal hysterectomy      Pelvic adhesive disease      Pelvic pain      Bilateral ovarian cysts      (H/O abdominal hysterectomy [Z90.710])      (Pelvic adhesive disease [N73.6])      (Pelvic pain [R10.2])      (Bilateral ovarian cysts [N83.201, N83.202])    Surgeons: Joe Hastings MD Provider: Gely Merino CRNA    Anesthesia Type: general ASA Status: 3          Anesthesia Type: general    Vitals  Vitals Value Taken Time   /70 05/05/22 1225   Temp 98.5 °F (36.9 °C) 05/05/22 1150   Pulse 81 05/05/22 1228   Resp 23 05/05/22 1150   SpO2 96 % 05/05/22 1228   Vitals shown include unvalidated device data.        Post Anesthesia Care and Evaluation    Patient location during evaluation: bedside  Patient participation: complete - patient participated  Level of consciousness: awake and alert  Pain score: 4  Pain management: adequate  Airway patency: patent  Anesthetic complications: No anesthetic complications  PONV Status: none  Cardiovascular status: acceptable  Respiratory status: acceptable  Hydration status: acceptable  No anesthesia care post op

## 2022-05-05 NOTE — OP NOTE
OPERATIVE REPORT    PROCEDURE:  DX LAPAROSCOPY, WITH LAPAROSCOPIC BILATERAL SALPINGOOPHORECTOMY, EXTENSIVE LYSIS OF ADHESIONS    PREOP DX:  Chronic pelvic pain, hx hysterectomy, hx extensive omental adhesions.    POSTOP DX:  same    SURGEONS:  Joe Hastings MD, Maggie Martines DO    ASSISTANT: Tish Antonio CSA; Nico Flores CSA was responsible for performing the following activities: Retraction, Suction, Irrigation, Suturing, Closing, Placing Dressing and Held/Positioned Camera and their skilled assistance was necessary for the success of this case.    ANESTHESIA:  GETA    FINDINGS:  Bilateral small ovarian cysts, extensive adhesions of the omentum to the pelvic floor requiring surgical consult for resolution.    COMPLICATIONS:  none    EBL:  30cc    IVFS:  800cc    URINE OUTPUT:  100cc    SPECIMENS: Left & Right ovaries and fallopian tubes.    ANTIBIOTICS:  Kefzol            DESCRIPTION OF THE PROCEDURE:    After GETA had been induced and time out done, pt was placed in the dorso-lithotomy position and prepped and draped.  No antibiotics were given.    A sponge stick was placed in the vagina.      A LUQ 5mm port was placed without incident and the abdomen was insufflated with CO2.  Accessory ports were placed:  5mm in the infraumbililcus, 8mm in the RLQ, and 12mm in the LLQ.      Pelvic and abdominal cavities were seen in their entirety.  There were extensive adhesions of the omentum to the pelvic floor.  I consulted Dr Martines to assist in lysis of these adhesions.      When the adhesions had been successfully lysed, the procedure proceeded.  Each IP ligament was divided with the ENSEAL and the tube and ovary removed from the remnant of the broad ligament on each side.  Each tube and ovary was removed separately in an endobag and sent for permanent section.  All bleeders were coagulated.  Dr Martines then restored the omentum to its natural position and there was no bleeding at the end  of the procedure. The pelvis and abdomen were irrigated till completely clear.  Koch catheter had been placed and the urine was clear at the termination of the procedure.    All instruments were removed and the incisions were reapproximated with 4-0 monocryl in a subcuticular continuous fashion.      Pt tolerate procedure well and went to the RR in satis condition.    All sponge, instrument and needle counts were correct x 3 according to the OR personnel.    Joe Hastings MD  11:36 EDT  @today@

## 2022-05-06 VITALS
SYSTOLIC BLOOD PRESSURE: 124 MMHG | WEIGHT: 198.4 LBS | HEIGHT: 66 IN | DIASTOLIC BLOOD PRESSURE: 70 MMHG | RESPIRATION RATE: 18 BRPM | OXYGEN SATURATION: 99 % | TEMPERATURE: 97.5 F | BODY MASS INDEX: 31.88 KG/M2 | HEART RATE: 64 BPM

## 2022-05-06 LAB
BASOPHILS # BLD AUTO: 0.03 10*3/MM3 (ref 0–0.2)
BASOPHILS NFR BLD AUTO: 0.3 % (ref 0–1.5)
DEPRECATED RDW RBC AUTO: 53.3 FL (ref 37–54)
EOSINOPHIL # BLD AUTO: 0.05 10*3/MM3 (ref 0–0.4)
EOSINOPHIL NFR BLD AUTO: 0.4 % (ref 0.3–6.2)
ERYTHROCYTE [DISTWIDTH] IN BLOOD BY AUTOMATED COUNT: 15.2 % (ref 12.3–15.4)
HCT VFR BLD AUTO: 38.3 % (ref 34–46.6)
HGB BLD-MCNC: 12.4 G/DL (ref 12–15.9)
IMM GRANULOCYTES # BLD AUTO: 0.05 10*3/MM3 (ref 0–0.05)
IMM GRANULOCYTES NFR BLD AUTO: 0.4 % (ref 0–0.5)
LAB AP CASE REPORT: NORMAL
LYMPHOCYTES # BLD AUTO: 1.78 10*3/MM3 (ref 0.7–3.1)
LYMPHOCYTES NFR BLD AUTO: 15.3 % (ref 19.6–45.3)
MCH RBC QN AUTO: 30.8 PG (ref 26.6–33)
MCHC RBC AUTO-ENTMCNC: 32.4 G/DL (ref 31.5–35.7)
MCV RBC AUTO: 95.3 FL (ref 79–97)
MONOCYTES # BLD AUTO: 0.76 10*3/MM3 (ref 0.1–0.9)
MONOCYTES NFR BLD AUTO: 6.6 % (ref 5–12)
NEUTROPHILS NFR BLD AUTO: 77 % (ref 42.7–76)
NEUTROPHILS NFR BLD AUTO: 8.93 10*3/MM3 (ref 1.7–7)
NRBC BLD AUTO-RTO: 0 /100 WBC (ref 0–0.2)
PATH REPORT.FINAL DX SPEC: NORMAL
PATH REPORT.GROSS SPEC: NORMAL
PLATELET # BLD AUTO: 234 10*3/MM3 (ref 140–450)
PMV BLD AUTO: 9.5 FL (ref 6–12)
RBC # BLD AUTO: 4.02 10*6/MM3 (ref 3.77–5.28)
WBC NRBC COR # BLD: 11.6 10*3/MM3 (ref 3.4–10.8)

## 2022-05-06 PROCEDURE — G0378 HOSPITAL OBSERVATION PER HR: HCPCS

## 2022-05-06 PROCEDURE — 85025 COMPLETE CBC W/AUTO DIFF WBC: CPT | Performed by: OBSTETRICS & GYNECOLOGY

## 2022-05-06 PROCEDURE — 99024 POSTOP FOLLOW-UP VISIT: CPT | Performed by: OBSTETRICS & GYNECOLOGY

## 2022-05-06 RX ORDER — PSEUDOEPHEDRINE HCL 30 MG
100 TABLET ORAL 2 TIMES DAILY
Qty: 14 EACH | Refills: 0 | Status: SHIPPED | OUTPATIENT
Start: 2022-05-06 | End: 2023-01-13

## 2022-05-06 RX ORDER — HYDROCODONE BITARTRATE AND ACETAMINOPHEN 5; 325 MG/1; MG/1
1 TABLET ORAL EVERY 6 HOURS PRN
Qty: 10 TABLET | Refills: 0 | Status: SHIPPED | OUTPATIENT
Start: 2022-05-06 | End: 2022-05-25

## 2022-05-06 RX ADMIN — Medication 1 PATCH: at 08:42

## 2022-05-06 RX ADMIN — DOCUSATE SODIUM 100 MG: 100 CAPSULE, LIQUID FILLED ORAL at 08:41

## 2022-05-06 RX ADMIN — HYDROCODONE BITARTRATE AND ACETAMINOPHEN 2 TABLET: 7.5; 325 TABLET ORAL at 09:34

## 2022-05-06 RX ADMIN — HYDROCODONE BITARTRATE AND ACETAMINOPHEN 2 TABLET: 7.5; 325 TABLET ORAL at 02:36

## 2022-05-06 RX ADMIN — PREGABALIN 75 MG: 75 CAPSULE ORAL at 08:41

## 2022-05-06 RX ADMIN — IBUPROFEN 800 MG: 800 TABLET, FILM COATED ORAL at 09:34

## 2022-05-06 NOTE — PROGRESS NOTES
Patient: Alexandra Cantrell  Procedure(s):  DIAGNOSTIC LAPAROTOMY, LAPAROSCOPIC BILATERAL SALPINGOOPHORECTOMY, EXTENSIVE LYSIS OF ADHESIONS  Anesthesia type: general    Patient location: [facility] Surgical Floor  Last vitals:   Vitals:    05/06/22 0057   BP: 116/72   Pulse: 73   Resp: 16   Temp: 98.4 °F (36.9 °C)   SpO2:      Level of consciousness: awake, alert and oriented    Post-anesthesia pain: adequate analgesia  Airway patency: patent  Respiratory: unassisted  Cardiovascular: stable and blood pressure at baseline  Hydration: euvolemic    Anesthetic complications: no

## 2022-05-06 NOTE — DISCHARGE SUMMARY
"  Date of Discharge:  5/6/2022    Discharge Diagnosis:   S/p lap bso, CRISTIAN    Problem List:    Pelvic pain    H/O abdominal hysterectomy-for chronic DUB    Smoker    Bilateral ovarian cysts    Pelvic adhesive disease    Asthma    Rheumatoid arthritis (HCC)    Chronic left shoulder pain    Vaginal discharge: BV, candida, ureaplasma    Status post bilateral salpingo-oophorectomy      Presenting Problem/History of Present Illness  H/O abdominal hysterectomy [Z90.710]  Pelvic adhesive disease [N73.6]  Pelvic pain [R10.2]  Bilateral ovarian cysts [N83.201, N83.202]  Status post bilateral salpingo-oophorectomy [Z90.722]    HPI    ROS:   Review of Systems    Hospital Course  Pt had good return of bowel and bladder function.  Was tolerating diet well, minimal pain, ambulating well.      /70 (BP Location: Right arm, Patient Position: Sitting)   Pulse 64   Temp 97.5 °F (36.4 °C) (Oral)   Resp 18   Ht 166.4 cm (65.5\")   Wt 90 kg (198 lb 6.4 oz)   LMP  (LMP Unknown)   SpO2 99%   BMI 32.51 kg/m²      Procedures Performed  Procedure(s):  DIAGNOSTIC LAPAROTOMY, LAPAROSCOPIC BILATERAL SALPINGOOPHORECTOMY, EXTENSIVE LYSIS OF ADHESIONS     This patient has no babies on file.    Consults:   Consults     No orders found for last 30 day(s).          Pertinent Test Results:   Lab Results (last 24 hours)     Procedure Component Value Units Date/Time    CBC & Differential [[id number]]  (Abnormal) Collected: 05/06/22 0604    Specimen: Blood from Arm, Left Updated: 05/06/22 0608    Narrative:      The following orders were created for panel order CBC & Differential.  Procedure                               Abnormality         Status                     ---------                               -----------         ------                     CBC Auto Differential[[id number]]        Abnormal            Final result                 Please view results for these tests on the individual orders.    CBC Auto Differential [[id number]]  " (Abnormal) Collected: 05/06/22 0604    Specimen: Blood from Arm, Left Updated: 05/06/22 0608     WBC 11.60 10*3/mm3      RBC 4.02 10*6/mm3      Hemoglobin 12.4 g/dL      Hematocrit 38.3 %      MCV 95.3 fL      MCH 30.8 pg      MCHC 32.4 g/dL      RDW 15.2 %      RDW-SD 53.3 fl      MPV 9.5 fL      Platelets 234 10*3/mm3      Neutrophil % 77.0 %      Lymphocyte % 15.3 %      Monocyte % 6.6 %      Eosinophil % 0.4 %      Basophil % 0.3 %      Immature Grans % 0.4 %      Neutrophils, Absolute 8.93 10*3/mm3      Lymphocytes, Absolute 1.78 10*3/mm3      Monocytes, Absolute 0.76 10*3/mm3      Eosinophils, Absolute 0.05 10*3/mm3      Basophils, Absolute 0.03 10*3/mm3      Immature Grans, Absolute 0.05 10*3/mm3      nRBC 0.0 /100 WBC     Tissue Pathology Exam [[id number]] Collected: 05/05/22 1119    Specimen: Tissue from Ovary, Left with Fallopian Tube; Tissue from Ovary, Right with Fallopian Tube Updated: 05/05/22 1158          Condition on Discharge:  satis    Vital Signs  Temp:  [97.5 °F (36.4 °C)-98.5 °F (36.9 °C)] 97.5 °F (36.4 °C)  Heart Rate:  [62-88] 64  Resp:  [12-23] 18  BP: (110-132)/(52-92) 124/70    Physical Exam:  Physical Exam      MEDICAL DECISION MAKING:     Discharge Disposition  Home or Self Care    Discharge Medications     Discharge Medications      New Medications      Instructions Start Date   docusate sodium 100 MG capsule   100 mg, Oral, 2 Times Daily      HYDROcodone-acetaminophen 5-325 MG per tablet  Commonly known as: Norco   1 tablet, Oral, Every 6 Hours PRN         Continue These Medications      Instructions Start Date   albuterol 108 (90 Base) MCG/ACT inhaler  Commonly known as: ProAir RespiClick   2 puffs, Inhalation, Every 4 Hours PRN      ProAir  (90 Base) MCG/ACT inhaler  Generic drug: albuterol sulfate HFA   INHALE 1 PUFF BY MOUTH EVERY 4 HOURS AS NEEDED      folic acid 1 MG tablet  Commonly known as: FOLVITE   1,000 mcg, Oral, Daily      ibuprofen 800 MG tablet  Commonly known as:  ADVIL,MOTRIN   800 mg, Oral, Every 8 Hours PRN      Loratadine 10 MG capsule   10 mg, Daily      ondansetron ODT 4 MG disintegrating tablet  Commonly known as: ZOFRAN-ODT   4 mg, Translingual, Every 4 Hours PRN      pregabalin 75 MG capsule  Commonly known as: Lyrica   75 mg, Oral, 2 Times Daily      Symbicort 160-4.5 MCG/ACT inhaler  Generic drug: budesonide-formoterol   2 puffs, Inhalation, 2 Times Daily      tiZANidine 4 MG tablet  Commonly known as: ZANAFLEX   4 mg, Oral, 2 Times Daily      vitamin D 1.25 MG (14881 UT) capsule capsule  Commonly known as: ERGOCALCIFEROL   50,000 Units, Oral, Weekly             Discharge Diet   Diet Instructions     Diet:      Diet Texture / Consistency: Regular          Activity at Discharge  Activity Instructions     Activity as Tolerated      Pelvic Rest            Follow-up Appointments  Future Appointments   Date Time Provider Department Center   5/25/2022  9:00 AM Joe Hastings MD [facility]   4/5/2023 10:15 AM Joe Hastings MD [facility]     Additional Instructions for the Follow-ups that You Need to Schedule     Discharge Follow-up with Specified Provider: dr hastings; 2 Weeks   As directed      To: dr hastings    Follow Up: 2 Weeks    Follow Up Details: postop check         Notify Physician or Go To The ED For the Following Conditions   As directed      Instructions after dischage:    No driving for 2 weeks, call for temp>101, heavy vaginal bleeding, increasing lower abdominal pain, not passing gas any more or signs of incisional infection . NOTHING IN THE VAGINA FOR 6 WEEKS, NO EXCEPTIONS. No lifting over 10lbs (gallon of milk). Shower is fine. Take dressings off in 1-2 days, may leave open to air. Any problems call the office 24/7 @ [phone].  Walk as much as possible.  Use stool softener.    Order Comments: Instructions after dischage:  No driving for 2 weeks, call for temp>101, heavy vaginal bleeding, increasing lower  abdominal pain, not passing gas any more or signs of incisional infection . NOTHING IN THE VAGINA FOR 6 WEEKS, NO EXCEPTIONS. No lifting over 10lbs (gallon of milk). Shower is fine. Take dressings off in 1-2 days, may leave open to air. Any problems call the office 24/7 @ [phone].  Walk as much as possible.  Use stool softener.                      Joe Hastings MD  10:03 EDT  5/6/2022

## 2022-05-06 NOTE — PLAN OF CARE
Problem: Adult Inpatient Plan of Care  Goal: Plan of Care Review  Outcome: Ongoing, Progressing  Flowsheets (Taken 5/6/2022 0614)  Progress: improving  Plan of Care Reviewed With: patient  Outcome Evaluation: VSS, pain well controlled w/ PRN pain medication, up ad jim independently, voiding spontaneously   Goal Outcome Evaluation:  Plan of Care Reviewed With: patient        Progress: improving  Outcome Evaluation: VSS, pain well controlled w/ PRN pain medication, up ad jim independently, voiding spontaneously

## 2022-05-06 NOTE — PLAN OF CARE
Goal Outcome Evaluation:  Plan of Care Reviewed With: patient        Progress: improving  Outcome Evaluation: discharge to home

## 2022-05-07 NOTE — CASE MANAGEMENT/SOCIAL WORK
Case Management Discharge Note      Final Note: Discharged home.         Selected Continued Care - Discharged on 5/6/2022 Admission date: 5/5/2022 - Discharge disposition: Home or Self Care    Destination    No services have been selected for the patient.              Durable Medical Equipment    No services have been selected for the patient.              Dialysis/Infusion    No services have been selected for the patient.              Home Medical Care    No services have been selected for the patient.              Therapy    No services have been selected for the patient.              Community Resources    No services have been selected for the patient.              Community & DME    No services have been selected for the patient.                       Final Discharge Disposition Code: 01 - home or self-care

## 2022-05-25 ENCOUNTER — OFFICE VISIT (OUTPATIENT)
Dept: OBSTETRICS AND GYNECOLOGY | Facility: CLINIC | Age: 42
End: 2022-05-25

## 2022-05-25 VITALS
DIASTOLIC BLOOD PRESSURE: 80 MMHG | SYSTOLIC BLOOD PRESSURE: 126 MMHG | HEIGHT: 66 IN | WEIGHT: 201 LBS | BODY MASS INDEX: 32.3 KG/M2

## 2022-05-25 DIAGNOSIS — Z09 POSTOPERATIVE FOLLOW-UP: Primary | ICD-10-CM

## 2022-05-25 PROCEDURE — 99024 POSTOP FOLLOW-UP VISIT: CPT | Performed by: OBSTETRICS & GYNECOLOGY

## 2022-05-25 RX ORDER — PREDNISONE 10 MG/1
TABLET ORAL
COMMUNITY
Start: 2022-05-17 | End: 2023-01-13

## 2022-05-25 RX ORDER — NICOTINE 21 MG/24H
PATCH, EXTENDED RELEASE TOPICAL
COMMUNITY
Start: 2022-05-09 | End: 2023-01-13

## 2022-05-25 NOTE — PROGRESS NOTES
POSTOP VISIT    Patient Care Team:  Tahmina Murray MD as PCP - General (Family Medicine)  -----------------------------------------------------HISTORY---------------------------------------------------    Chief Complaint: Pt here for postop check      42 y.o. No obstetric history on file. No LMP recorded (lmp unknown). Patient has had a hysterectomy.    HPI:  Pt here for postop check for procedure: Diagnostic Laparotomy, Laparoscopic Bilateral Salpingoophorectomy, Extensive Lysis Of Adhesions completed on date: 2022  Pt reports complaints: none.  Tolerating diet well, normal bladder and bowel function, incision/s healing well.  Vaginal bleeding? no    PFSH:   1.    Past Medical History:   Diagnosis Date   • Abdominal pain     SCHEDULED FOR SX   • Abscess     LEFT UPPER JAW, TOOTH BROKE OFF   • Anemia    • Arthritis     FINGERS & TOES   • Asthma    • COPD (chronic obstructive pulmonary disease) (HCC)    • Migraines      2.   Family History   Problem Relation Age of Onset   • Heart attack Mother    • Diabetes Mother    • Heart disease Father    • Stroke Father    • Lung cancer Father    • Heart attack Sister    • Heart attack Brother    • Diabetes Maternal Grandmother    • Diabetes Paternal Grandmother    • Heart attack Brother    • Heart attack Brother    • Heart attack Sister    • Heart attack Sister    • Ovarian cancer Sister          PAST HISTORY REVIEWED:  1.   Past Surgical History:   Procedure Laterality Date   • APPENDECTOMY     •  SECTION      X3   • CHOLECYSTECTOMY      LAP   • DIAGNOSTIC LAPAROSCOPY N/A 10/25/2018    Procedure: DIAGNOSTIC LAPAROSCOPY with lysis of adhesions;  Surgeon: Joe Hastings MD;  Location: [facility] OR;  Service: Obstetrics/Gynecology   • DIAGNOSTIC LAPAROSCOPY N/A 2022    Procedure: DIAGNOSTIC LAPAROTOMY, LAPAROSCOPIC BILATERAL SALPINGOOPHORECTOMY, EXTENSIVE LYSIS OF ADHESIONS;  Surgeon: Joe Hastings MD;  Location: [facility] OR;  Service:  Obstetrics/Gynecology;  Laterality: N/A;   • HYSTERECTOMY      OPEN   • TUBAL ABDOMINAL LIGATION        2.   Current Outpatient Medications:   •  albuterol (PROAIR RESPICLICK) 108 (90 Base) MCG/ACT inhaler, Inhale 2 puffs Every 4 (Four) Hours As Needed for Wheezing (asthma)., Disp: 1 inhaler, Rfl: 11  •  docusate sodium 100 MG capsule, Take 1 capsule by mouth 2 (Two) Times a Day., Disp: 14 each, Rfl: 0  •  folic acid (FOLVITE) 1 MG tablet, Take 1,000 mcg by mouth Daily., Disp: , Rfl:   •  HM Nicotine 21 MG/24HR patch, APPLY 1 PATCH TO THE SKIN TRANSDERMALLY DAILY, Disp: , Rfl:   •  ibuprofen (ADVIL,MOTRIN) 800 MG tablet, Take 1 tablet by mouth Every 8 (Eight) Hours As Needed for Mild Pain ., Disp: 30 tablet, Rfl: 0  •  Loratadine 10 MG capsule, 10 mg Daily., Disp: , Rfl:   •  ondansetron ODT (ZOFRAN-ODT) 4 MG disintegrating tablet, Place 1 tablet on the tongue Every 4 (Four) Hours As Needed for Nausea or Vomiting., Disp: 9 tablet, Rfl: 0  •  predniSONE (DELTASONE) 10 MG tablet, TAKE 6 TABLETS BY MOUTH EVERY DAY FOR 2 DAYS, THEN FOUR TABLETS BY MOUTH EVERY DAY FOR 2 DAYS, THEN TWO TABLETS BY MOUTH EVERY DAY FOR 2 DAYS, THEN 1 TABLET BY MOUTH EVERY DAY FOR 2 DAYS, THEN A HALF TABLET BY MOUTH EVERY DAY FOR 2 DAYS. TAKE WITH FOOD FOR 10 DAYS TOTAL., Disp: , Rfl:   •  pregabalin (Lyrica) 75 MG capsule, Take 1 capsule by mouth 2 (Two) Times a Day., Disp: 60 capsule, Rfl: 0  •  ProAir  (90 Base) MCG/ACT inhaler, INHALE 1 PUFF BY MOUTH EVERY 4 HOURS AS NEEDED, Disp: , Rfl:   •  Symbicort 160-4.5 MCG/ACT inhaler, Inhale 2 puffs 2 (Two) Times a Day., Disp: , Rfl:   •  tiZANidine (ZANAFLEX) 4 MG tablet, Take 4 mg by mouth 2 (Two) Times a Day., Disp: , Rfl:   •  vitamin D (ERGOCALCIFEROL) 1.25 MG (28856 UT) capsule capsule, Take 50,000 Units by mouth 1 (One) Time Per Week., Disp: , Rfl:   3.   Allergies   Allergen Reactions   • Aspirin Anaphylaxis   • Sulfamethoxazole-Trimethoprim Anaphylaxis and Hives   • Gabapentin  "Other (See Comments)     Behavioral changes, aggressive behavior UNABLE TO EAT WHILE TAKING THE MEDICINE       ROS:  Review of Systems   Constitutional: Negative.    Respiratory: Negative.    Cardiovascular: Negative.    Gastrointestinal: Negative.    Genitourinary: Negative.    Neurological: Negative.    Psychiatric/Behavioral: Negative.    :    -----------------------------------------------PHYSICAL EXAM----------------------------------------------    Vital Signs: /80   Ht 166.4 cm (65.51\")   Wt 91.2 kg (201 lb)   LMP  (LMP Unknown)   Breastfeeding No   BMI 32.93 kg/m²    Flowsheet Rows    Flowsheet Row First Filed Value   Admission Height 166.4 cm (65.51\") Documented at 05/25/2022 0845   Admission Weight 91.2 kg (201 lb) Documented at 05/25/2022 0845          Physical Exam  Vitals and nursing note reviewed.   Constitutional:       Appearance: She is well-developed.   HENT:      Head: Normocephalic and atraumatic.   Pulmonary:      Effort: Pulmonary effort is normal.   Abdominal:      General: Bowel sounds are normal. There is no distension.      Palpations: Abdomen is soft. There is no mass.      Tenderness: There is no abdominal tenderness. There is no guarding or rebound.      Hernia: No hernia is present.      Comments: Incision clean dry and intact   Musculoskeletal:         General: Normal range of motion.      Cervical back: Normal range of motion.   Skin:     General: Skin is warm and dry.   Neurological:      Mental Status: She is alert and oriented to person, place, and time.   Psychiatric:         Behavior: Behavior normal.         Thought Content: Thought content normal.         Judgment: Judgment normal.         -----------------------------------------------MEDICAL DECISION MAKING-----------------------------      DATA Review & labs ordered:     1.   Lab Results (last 24 hours)     Procedure Component Value Units Date/Time    POC Urinalysis Dipstick [[id number]]  (Normal) Collected: " 05/25/22 0851    Specimen: Urine Updated: 05/25/22 0851     Color Yellow     Clarity, UA Clear     Glucose, UA Negative mg/dL      Bilirubin Negative     Ketones, UA Negative     Specific Gravity  1.005     Blood, UA Negative     pH, Urine 5.0     Protein, POC Negative mg/dL      Urobilinogen, UA Normal     Leukocytes Negative     Nitrite, UA Negative        2.   Imaging Results (Last 24 Hours)     ** No results found for the last 24 hours. **        3.   ECG/EMG Results (most recent)     None               Diagnoses and all orders for this visit:    1. Postoperative follow-up (Primary)  -     POC Urinalysis Dipstick      9. Risk counseling done:  yes  10. Ultrasound ordered and reviewed?   no  11.  Path report reviewed? yes    IMPRESSION & DIAGNOSIS:      Doing well postop.      PLAN:     RTO Return in about 1 year (around 5/25/2023) for Annual physical.       I spent 20+ minutes caring for Alexandra on this date of service. This time includes time spent by me in the following activities: preparing for the visit, reviewing tests, obtaining and/or reviewing a separately obtained history, performing a medically appropriate examination and/or evaluation, counseling and educating the patient/family/caregiver, ordering medications, tests, or procedures, referring and communicating with other health care professionals, documenting information in the medical record, independently interpreting results and communicating that information with the patient/family/caregiver and care coordination    Joe Hastings MD  09:05 EDT  05/25/22

## 2023-01-13 ENCOUNTER — HOSPITAL ENCOUNTER (EMERGENCY)
Facility: HOSPITAL | Age: 43
Discharge: HOME | End: 2023-01-14
Attending: EMERGENCY MEDICINE | Admitting: EMERGENCY MEDICINE
Payer: MEDICAID

## 2023-01-13 VITALS
HEART RATE: 57 BPM | BODY MASS INDEX: 31.46 KG/M2 | WEIGHT: 195.77 LBS | DIASTOLIC BLOOD PRESSURE: 84 MMHG | SYSTOLIC BLOOD PRESSURE: 161 MMHG | HEIGHT: 66 IN | TEMPERATURE: 97.9 F | OXYGEN SATURATION: 100 % | RESPIRATION RATE: 19 BRPM

## 2023-01-13 DIAGNOSIS — R52 GENERALIZED PAIN: Primary | ICD-10-CM

## 2023-01-13 LAB
ALBUMIN SERPL-MCNC: 4.5 G/DL (ref 3.5–5.2)
ALBUMIN/GLOB SERPL: 1.8 G/DL
ALP SERPL-CCNC: 96 U/L (ref 39–117)
ALT SERPL W P-5'-P-CCNC: 24 U/L (ref 1–33)
ANION GAP SERPL CALCULATED.3IONS-SCNC: 9 MMOL/L (ref 5–15)
AST SERPL-CCNC: 19 U/L (ref 1–32)
BASOPHILS # BLD AUTO: 0.05 10*3/MM3 (ref 0–0.2)
BASOPHILS NFR BLD AUTO: 0.7 % (ref 0–1.5)
BILIRUB SERPL-MCNC: 0.4 MG/DL (ref 0–1.2)
BILIRUB UR QL STRIP: NEGATIVE
BUN SERPL-MCNC: 8 MG/DL (ref 6–20)
BUN/CREAT SERPL: 9.1 (ref 7–25)
CALCIUM SPEC-SCNC: 9.7 MG/DL (ref 8.6–10.5)
CHLORIDE SERPL-SCNC: 108 MMOL/L (ref 98–107)
CLARITY UR: CLEAR
CO2 SERPL-SCNC: 27 MMOL/L (ref 22–29)
COLOR UR: YELLOW
CREAT SERPL-MCNC: 0.88 MG/DL (ref 0.57–1)
CRP SERPL-MCNC: 0.3 MG/DL (ref 0–0.5)
DEPRECATED RDW RBC AUTO: 47.7 FL (ref 37–54)
EGFRCR SERPLBLD CKD-EPI 2021: 83.2 ML/MIN/1.73
EOSINOPHIL # BLD AUTO: 0.14 10*3/MM3 (ref 0–0.4)
EOSINOPHIL NFR BLD AUTO: 1.9 % (ref 0.3–6.2)
ERYTHROCYTE [DISTWIDTH] IN BLOOD BY AUTOMATED COUNT: 14.9 % (ref 12.3–15.4)
ERYTHROCYTE [SEDIMENTATION RATE] IN BLOOD: 4 MM/HR (ref 0–20)
GLOBULIN UR ELPH-MCNC: 2.5 GM/DL
GLUCOSE SERPL-MCNC: 92 MG/DL (ref 65–99)
GLUCOSE UR STRIP-MCNC: NEGATIVE MG/DL
HCT VFR BLD AUTO: 41.6 % (ref 34–46.6)
HGB BLD-MCNC: 14.4 G/DL (ref 12–15.9)
HGB UR QL STRIP.AUTO: NEGATIVE
HOLD SPECIMEN: NORMAL
HOLD SPECIMEN: NORMAL
IMM GRANULOCYTES # BLD AUTO: 0.01 10*3/MM3 (ref 0–0.05)
IMM GRANULOCYTES NFR BLD AUTO: 0.1 % (ref 0–0.5)
KETONES UR QL STRIP: NEGATIVE
LEUKOCYTE ESTERASE UR QL STRIP.AUTO: NEGATIVE
LIPASE SERPL-CCNC: 45 U/L (ref 13–60)
LYMPHOCYTES # BLD AUTO: 3.19 10*3/MM3 (ref 0.7–3.1)
LYMPHOCYTES NFR BLD AUTO: 43.3 % (ref 19.6–45.3)
MCH RBC QN AUTO: 29.9 PG (ref 26.6–33)
MCHC RBC AUTO-ENTMCNC: 34.6 G/DL (ref 31.5–35.7)
MCV RBC AUTO: 86.5 FL (ref 79–97)
MONOCYTES # BLD AUTO: 0.43 10*3/MM3 (ref 0.1–0.9)
MONOCYTES NFR BLD AUTO: 5.8 % (ref 5–12)
NEUTROPHILS NFR BLD AUTO: 3.54 10*3/MM3 (ref 1.7–7)
NEUTROPHILS NFR BLD AUTO: 48.2 % (ref 42.7–76)
NITRITE UR QL STRIP: NEGATIVE
NRBC BLD AUTO-RTO: 0 /100 WBC (ref 0–0.2)
PH UR STRIP.AUTO: 6.5 [PH] (ref 5–8)
PLATELET # BLD AUTO: 283 10*3/MM3 (ref 140–450)
PMV BLD AUTO: 10.2 FL (ref 6–12)
POTASSIUM SERPL-SCNC: 3.6 MMOL/L (ref 3.5–5.2)
PROT SERPL-MCNC: 7 G/DL (ref 6–8.5)
PROT UR QL STRIP: NEGATIVE
RBC # BLD AUTO: 4.81 10*6/MM3 (ref 3.77–5.28)
SODIUM SERPL-SCNC: 144 MMOL/L (ref 136–145)
SP GR UR STRIP: 1.01 (ref 1–1.03)
UROBILINOGEN UR QL STRIP: NORMAL
WBC NRBC COR # BLD: 7.36 10*3/MM3 (ref 3.4–10.8)
WHOLE BLOOD HOLD COAG: NORMAL
WHOLE BLOOD HOLD SPECIMEN: NORMAL

## 2023-01-13 PROCEDURE — 36415 COLL VENOUS BLD VENIPUNCTURE: CPT | Performed by: EMERGENCY MEDICINE

## 2023-01-13 PROCEDURE — 83690 ASSAY OF LIPASE: CPT | Performed by: EMERGENCY MEDICINE

## 2023-01-13 PROCEDURE — 81003 URINALYSIS AUTO W/O SCOPE: CPT | Performed by: EMERGENCY MEDICINE

## 2023-01-13 PROCEDURE — 86140 C-REACTIVE PROTEIN: CPT | Performed by: EMERGENCY MEDICINE

## 2023-01-13 PROCEDURE — 99283 EMERGENCY DEPT VISIT LOW MDM: CPT

## 2023-01-13 PROCEDURE — 80053 COMPREHEN METABOLIC PANEL: CPT | Performed by: EMERGENCY MEDICINE

## 2023-01-13 PROCEDURE — 85652 RBC SED RATE AUTOMATED: CPT | Performed by: EMERGENCY MEDICINE

## 2023-01-13 PROCEDURE — 85025 COMPLETE CBC W/AUTO DIFF WBC: CPT | Performed by: EMERGENCY MEDICINE

## 2023-01-13 RX ORDER — PREGABALIN 75 MG/1
1 CAPSULE ORAL 2 TIMES DAILY
COMMUNITY
Start: 2023-01-10

## 2023-01-13 RX ORDER — ESTRADIOL 1 MG/1
1 TABLET ORAL DAILY
COMMUNITY
End: 2023-01-13

## 2023-01-13 RX ORDER — ATORVASTATIN CALCIUM 10 MG/1
1 TABLET, FILM COATED ORAL DAILY
COMMUNITY

## 2023-01-13 RX ORDER — MONTELUKAST SODIUM 10 MG/1
1 TABLET ORAL EVERY 24 HOURS
COMMUNITY
Start: 2022-10-24

## 2023-01-13 RX ORDER — SODIUM CHLORIDE 0.9 % (FLUSH) 0.9 %
10 SYRINGE (ML) INJECTION AS NEEDED
Status: DISCONTINUED | OUTPATIENT
Start: 2023-01-13 | End: 2023-01-14 | Stop reason: HOSPADM

## 2023-01-13 RX ORDER — TIZANIDINE HYDROCHLORIDE 4 MG/1
1 CAPSULE, GELATIN COATED ORAL 2 TIMES DAILY PRN
COMMUNITY

## 2023-01-13 RX ORDER — IBUPROFEN 800 MG/1
1 TABLET ORAL 4 TIMES DAILY PRN
COMMUNITY
End: 2023-01-13

## 2023-01-14 ENCOUNTER — APPOINTMENT (OUTPATIENT)
Dept: CT IMAGING | Facility: HOSPITAL | Age: 43
End: 2023-01-14
Payer: MEDICAID

## 2023-01-14 PROCEDURE — 25010000002 ONDANSETRON PER 1 MG: Performed by: NURSE PRACTITIONER

## 2023-01-14 PROCEDURE — 74177 CT ABD & PELVIS W/CONTRAST: CPT

## 2023-01-14 PROCEDURE — 0 IOPAMIDOL PER 1 ML: Performed by: EMERGENCY MEDICINE

## 2023-01-14 PROCEDURE — 96375 TX/PRO/DX INJ NEW DRUG ADDON: CPT

## 2023-01-14 PROCEDURE — 25010000002 KETOROLAC TROMETHAMINE PER 15 MG: Performed by: NURSE PRACTITIONER

## 2023-01-14 PROCEDURE — 96374 THER/PROPH/DIAG INJ IV PUSH: CPT

## 2023-01-14 RX ORDER — KETOROLAC TROMETHAMINE 30 MG/ML
30 INJECTION, SOLUTION INTRAMUSCULAR; INTRAVENOUS ONCE
Status: COMPLETED | OUTPATIENT
Start: 2023-01-14 | End: 2023-01-14

## 2023-01-14 RX ORDER — ONDANSETRON 2 MG/ML
4 INJECTION INTRAMUSCULAR; INTRAVENOUS ONCE
Status: COMPLETED | OUTPATIENT
Start: 2023-01-14 | End: 2023-01-14

## 2023-01-14 RX ADMIN — ONDANSETRON 4 MG: 2 INJECTION INTRAMUSCULAR; INTRAVENOUS at 00:54

## 2023-01-14 RX ADMIN — IOPAMIDOL 100 ML: 755 INJECTION, SOLUTION INTRAVENOUS at 00:47

## 2023-01-14 RX ADMIN — KETOROLAC TROMETHAMINE 30 MG: 30 INJECTION, SOLUTION INTRAMUSCULAR; INTRAVENOUS at 00:55

## 2023-01-14 NOTE — DISCHARGE INSTRUCTIONS
All of your testing here today is negative as we discussed.  CT scan does not show any abnormality.    Continue your home muscle relaxers and Lyrica as well as other home medications.  Take medication as prescribed for pain.    Follow-up with your PCP for additional testing or treatment as indicated

## 2023-01-14 NOTE — ED PROVIDER NOTES
"=Time: 9:38 PM EST  Date of encounter:  2023  Independent Historian/Clinical History and Information was obtained by:   Patient  Chief Complaint   Patient presents with   • Pain       History is limited by: N/A    History of Present Illness:  Patient is a 42 y.o. year old female who presents to the emergency department for evaluation of pain all over.  Denies recent fall or injury.  Admits to nausea and denies vomiting, fever, chills and diarrhea.      History provided by:  Patient  Pain  Location:  Generalized.  \"From the top of my head to my toes\"  Quality:  Aching and sore  Severity:  Severe  Onset quality:  Gradual  Duration:  1 week  Timing:  Constant  Progression:  Unchanged  Chronicity:  New  Context:  New onset pain a week ago.  Talk to her family doctor and he said to come get lab work  Relieved by:  Nothing  Worsened by:  Nothing  Ineffective treatments:  And tried  Associated symptoms: abdominal pain (LLQ), myalgias and nausea    Associated symptoms: no chest pain, no diarrhea, no ear pain, no fatigue, no fever, no headaches, no rash, no shortness of breath, no vomiting and no wheezing        Patient Care Team  Primary Care Provider: Tahmina Murray MD    Past Medical History:     Allergies   Allergen Reactions   • Aspirin Anaphylaxis   • Sulfamethoxazole-Trimethoprim Anaphylaxis and Hives   • Gabapentin Other (See Comments)     Behavioral changes, aggressive behavior UNABLE TO EAT WHILE TAKING THE MEDICINE     Past Medical History:   Diagnosis Date   • Abdominal pain     SCHEDULED FOR SX   • Abscess     LEFT UPPER JAW, TOOTH BROKE OFF   • Anemia    • Arthritis     FINGERS & TOES   • Asthma    • COPD (chronic obstructive pulmonary disease) (HCC)    • Migraines      Past Surgical History:   Procedure Laterality Date   • APPENDECTOMY     •  SECTION      X3   • CHOLECYSTECTOMY      LAP   • DIAGNOSTIC LAPAROSCOPY N/A 10/25/2018    Procedure: DIAGNOSTIC LAPAROSCOPY with lysis of adhesions;  Surgeon: " Joe Hastings MD;  Location:  [facility];  Service: Obstetrics/Gynecology   • DIAGNOSTIC LAPAROSCOPY N/A 5/5/2022    Procedure: DIAGNOSTIC LAPAROTOMY, LAPAROSCOPIC BILATERAL SALPINGOOPHORECTOMY, EXTENSIVE LYSIS OF ADHESIONS;  Surgeon: Joe Hastings MD;  Location:  [facility];  Service: Obstetrics/Gynecology;  Laterality: N/A;   • HYSTERECTOMY      OPEN   • TUBAL ABDOMINAL LIGATION       Family History   Problem Relation Age of Onset   • Heart attack Mother    • Diabetes Mother    • Heart disease Father    • Stroke Father    • Lung cancer Father    • Heart attack Sister    • Heart attack Brother    • Diabetes Maternal Grandmother    • Diabetes Paternal Grandmother    • Heart attack Brother    • Heart attack Brother    • Heart attack Sister    • Heart attack Sister    • Ovarian cancer Sister        Home Medications:  Prior to Admission medications    Medication Sig Start Date End Date Taking? Authorizing Provider   albuterol (PROAIR RESPICLICK) 108 (90 Base) MCG/ACT inhaler Inhale 2 puffs Every 4 (Four) Hours As Needed for Wheezing (asthma). 7/31/19   Quin Paulino APRN   docusate sodium 100 MG capsule Take 1 capsule by mouth 2 (Two) Times a Day. 5/6/22   Joe Hastings MD   estradiol (Estrace) 1 MG tablet Take 1 mg by mouth Daily.    Emergency, Nurse Autumn RN   folic acid (FOLVITE) 1 MG tablet Take 1,000 mcg by mouth Daily. 4/20/22   ProviderDarell MD    Nicotine 21 MG/24HR patch APPLY 1 PATCH TO THE SKIN TRANSDERMALLY DAILY 5/9/22   ProviderDarell MD   ibuprofen (ADVIL,MOTRIN) 800 MG tablet Take 1 tablet by mouth Every 8 (Eight) Hours As Needed for Mild Pain . 7/31/19   Quin Paulino APRN   Loratadine 10 MG capsule 10 mg Daily.    Emergency, Nurse Autumn RN   ondansetron ODT (ZOFRAN-ODT) 4 MG disintegrating tablet Place 1 tablet on the tongue Every 4 (Four) Hours As Needed for Nausea or Vomiting. 3/6/22   Yudelka Lezama APRN   predniSONE (DELTASONE) 10 MG  tablet TAKE 6 TABLETS BY MOUTH EVERY DAY FOR 2 DAYS, THEN FOUR TABLETS BY MOUTH EVERY DAY FOR 2 DAYS, THEN TWO TABLETS BY MOUTH EVERY DAY FOR 2 DAYS, THEN 1 TABLET BY MOUTH EVERY DAY FOR 2 DAYS, THEN A HALF TABLET BY MOUTH EVERY DAY FOR 2 DAYS. TAKE WITH FOOD FOR 10 DAYS TOTAL. 5/17/22   Darell Mart MD   pregabalin (Lyrica) 75 MG capsule Take 1 capsule by mouth 2 (Two) Times a Day. 6/17/21   Leigh Patel APRN   ProAir  (90 Base) MCG/ACT inhaler INHALE 1 PUFF BY MOUTH EVERY 4 HOURS AS NEEDED 3/18/22   Darell Mart MD   Symbicort 160-4.5 MCG/ACT inhaler Inhale 2 puffs 2 (Two) Times a Day. 3/18/22   Darell Mart MD   tiZANidine (ZANAFLEX) 4 MG tablet Take 4 mg by mouth 2 (Two) Times a Day. 5/24/21   Darell Mart MD   vitamin D (ERGOCALCIFEROL) 1.25 MG (81241 UT) capsule capsule Take 50,000 Units by mouth 1 (One) Time Per Week. 4/20/22   Darell Mart MD        Social History:   Social History     Tobacco Use   • Smoking status: Every Day     Packs/day: 1.00     Years: 25.00     Pack years: 25.00     Types: Cigarettes   • Smokeless tobacco: Never   Vaping Use   • Vaping Use: Never used   Substance Use Topics   • Alcohol use: No   • Drug use: No         Review of Systems:  Review of Systems   Constitutional: Negative for fatigue and fever.   HENT: Negative for ear pain.    Respiratory: Negative for shortness of breath and wheezing.    Cardiovascular: Negative for chest pain.   Gastrointestinal: Positive for abdominal pain (LLQ) and nausea. Negative for diarrhea and vomiting.   Genitourinary: Negative for flank pain.   Musculoskeletal: Positive for arthralgias, back pain, myalgias and neck pain.   Skin: Negative for rash.   Neurological: Negative.  Negative for weakness, numbness and headaches.   Hematological: Negative.    Psychiatric/Behavioral: Negative.    All other systems reviewed and are negative.       Physical Exam:  /84 (BP Location: Right arm, Patient  "Position: Sitting)   Pulse 57   Temp 97.9 °F (36.6 °C) (Oral)   Resp 19   Ht 167.6 cm (66\")   Wt 88.8 kg (195 lb 12.3 oz)   LMP  (LMP Unknown)   SpO2 100%   BMI 31.60 kg/m²     Physical Exam  Constitutional:       Appearance: Normal appearance.   HENT:      Head: Normocephalic and atraumatic.      Nose: Nose normal.      Mouth/Throat:      Mouth: Mucous membranes are moist.   Eyes:      Pupils: Pupils are equal, round, and reactive to light.   Cardiovascular:      Rate and Rhythm: Normal rate and regular rhythm.      Heart sounds: Normal heart sounds.   Pulmonary:      Effort: Pulmonary effort is normal.      Breath sounds: Normal breath sounds.   Abdominal:      General: Bowel sounds are normal. There is no distension.      Palpations: Abdomen is soft.      Tenderness: There is abdominal tenderness ( Left lower quadrant). There is no right CVA tenderness or left CVA tenderness.   Musculoskeletal:         General: Normal range of motion.      Cervical back: Normal range of motion and neck supple. No tenderness.   Skin:     General: Skin is warm and dry.   Neurological:      General: No focal deficit present.      Mental Status: She is alert and oriented to person, place, and time.      Cranial Nerves: No cranial nerve deficit.      Sensory: No sensory deficit.      Motor: No weakness.   Psychiatric:         Mood and Affect: Mood normal.         Behavior: Behavior normal.                  Procedures:  Procedures      Medical Decision Making:      Comorbidities that affect care:    Asthma, COPD, Smoking    External Notes reviewed:    None      The following orders were placed and all results were independently analyzed by me:  Orders Placed This Encounter   Procedures   • CT Abdomen Pelvis With Contrast   • [place] Draw   • Comprehensive Metabolic Panel   • Lipase   • Urinalysis With Microscopic If Indicated (No Culture) - Urine, Clean Catch   • C-reactive Protein   • Sedimentation Rate   • CBC Auto " Differential   • Undress & Gown   • CBC & Differential   • Green Top (Gel)   • Lavender Top   • Gold Top - SST   • Light Blue Top       Medications Given in the Emergency Department:  Medications   ketorolac (TORADOL) injection 30 mg (30 mg Intravenous Given 1/14/23 0055)   ondansetron (ZOFRAN) injection 4 mg (4 mg Intravenous Given 1/14/23 0054)   iopamidol (ISOVUE-370) 76 % injection 100 mL (100 mL Intravenous Given 1/14/23 0047)        ED Course:    The patient was initially evaluated in the triage area where orders were placed. The patient was later dispositioned by STEFANI Hall.      The patient was advised to stay for completion of workup which includes but is not limited to communication of labs and radiological results, reassessment and plan. The patient was advised that leaving prior to disposition by a provider could result in critical findings that are not communicated to the patient.     ED Course as of 01/14/23 0719   Fri Jan 13, 2023 2139 --- PROVIDER IN TRIAGE NOTE ---    The patient was evaluated my Maciel martinez in triage. Orders were placed and the patient is currently awaiting disposition.    [AJ]   Sat Jan 14, 2023   0108 CT Abdomen Pelvis With Contrast  No acute findings [DS]      ED Course User Index  [AJ] Maciel Lopez PA-C  [DS] Maame Anderson APRN       Labs:    Lab Results (last 24 hours)     Procedure Component Value Units Date/Time    CBC & Differential [[id number]]  (Abnormal) Collected: 01/13/23 2152    Specimen: Blood Updated: 01/13/23 2213    Narrative:      The following orders were created for panel order CBC & Differential.  Procedure                               Abnormality         Status                     ---------                               -----------         ------                     CBC Auto Differential[[id number]]        Abnormal            Final result                 Please view results for these tests on the individual orders.    Comprehensive  Metabolic Panel [[id number]]  (Abnormal) Collected: 01/13/23 2152    Specimen: Blood Updated: 01/13/23 2239     Glucose 92 mg/dL      BUN 8 mg/dL      Creatinine 0.88 mg/dL      Sodium 144 mmol/L      Potassium 3.6 mmol/L      Chloride 108 mmol/L      CO2 27.0 mmol/L      Calcium 9.7 mg/dL      Total Protein 7.0 g/dL      Albumin 4.5 g/dL      ALT (SGPT) 24 U/L      AST (SGOT) 19 U/L      Alkaline Phosphatase 96 U/L      Total Bilirubin 0.4 mg/dL      Globulin 2.5 gm/dL      A/G Ratio 1.8 g/dL      BUN/Creatinine Ratio 9.1     Anion Gap 9.0 mmol/L      eGFR 83.2 mL/min/1.73      Comment: National Kidney Foundation and American Society of Nephrology (ASN) Task Force recommended calculation based on the Chronic Kidney Disease Epidemiology Collaboration (CKD-EPI) equation refit without adjustment for race.       Narrative:      GFR Normal >60  Chronic Kidney Disease <60  Kidney Failure <15      Lipase [[id number]]  (Normal) Collected: 01/13/23 2152    Specimen: Blood Updated: 01/13/23 2239     Lipase 45 U/L     C-reactive Protein [[id number]]  (Normal) Collected: 01/13/23 2152    Specimen: Blood Updated: 01/13/23 2239     C-Reactive Protein 0.30 mg/dL     Sedimentation Rate [[id number]]  (Normal) Collected: 01/13/23 2152    Specimen: Blood Updated: 01/13/23 2233     Sed Rate 4 mm/hr     CBC Auto Differential [[id number]]  (Abnormal) Collected: 01/13/23 2152    Specimen: Blood Updated: 01/13/23 2213     WBC 7.36 10*3/mm3      RBC 4.81 10*6/mm3      Hemoglobin 14.4 g/dL      Hematocrit 41.6 %      MCV 86.5 fL      MCH 29.9 pg      MCHC 34.6 g/dL      RDW 14.9 %      RDW-SD 47.7 fl      MPV 10.2 fL      Platelets 283 10*3/mm3      Neutrophil % 48.2 %      Lymphocyte % 43.3 %      Monocyte % 5.8 %      Eosinophil % 1.9 %      Basophil % 0.7 %      Immature Grans % 0.1 %      Neutrophils, Absolute 3.54 10*3/mm3      Lymphocytes, Absolute 3.19 10*3/mm3      Monocytes, Absolute 0.43 10*3/mm3      Eosinophils, Absolute 0.14  10*3/mm3      Basophils, Absolute 0.05 10*3/mm3      Immature Grans, Absolute 0.01 10*3/mm3      nRBC 0.0 /100 WBC     Urinalysis With Microscopic If Indicated (No Culture) - Urine, Clean Catch [[id number]]  (Normal) Collected: 01/13/23 2156    Specimen: Urine, Clean Catch Updated: 01/13/23 2218     Color, UA Yellow     Appearance, UA Clear     pH, UA 6.5     Specific Gravity, UA 1.008     Glucose, UA Negative     Ketones, UA Negative     Bilirubin, UA Negative     Blood, UA Negative     Protein, UA Negative     Leuk Esterase, UA Negative     Nitrite, UA Negative     Urobilinogen, UA 0.2 E.U./dL    Narrative:      Urine microscopic not indicated.           Imaging:    CT Abdomen Pelvis With Contrast    Result Date: 1/14/2023  PROCEDURE: CT ABDOMEN PELVIS W CONTRAST  COMPARISON: [facility], CT, ABDOMEN/PELVIS WITH CONTRAST, 12/10/2014, 17:42.  INDICATIONS: llq abdominal pain, nausea  TECHNIQUE: After obtaining the patient's consent, CT images were created with non-ionic intravenous contrast material.   PROTOCOL:   Standard imaging protocol performed    RADIATION:   DLP: 685.7 mGy*cm   Automated exposure control was utilized to minimize radiation dose. CONTRAST: 100 cc Isovue 370 I.V. LABS:   eGFR: >60 ml/min/1.73m2  FINDINGS:  LIVER: Normal.  No enlargement, atrophy, abnormal density, or significant focal lesion.  BILIARY: There are clips from cholecystectomy.  There is no intrahepatic biliary ductal dilatation. PANCREAS: Normal.  No lesion, fluid collection, ductal dilatation, or atrophy.  SPLEEN: Normal.  No enlargement or focal lesion.  KIDNEYS: Normal.  No mass, obstruction, or calcification.  ADRENALS: Normal.  No mass or enlargement.  AORTA/VASCULAR: Normal.  No aneurysm or dissection.  RETROPERITONEUM: Normal.  No mass or adenopathy.  BOWEL/MESENTERY: Normal.  No visible mass, obstruction, or bowel wall thickening.  ABDOMINAL WALL: There is a small periumbilical hernia containing only fat. URINARY  BLADDER: Normal.  No visible focal wall thickening, lesion, or calculus.  PELVIC NODES: Normal.  No adenopathy.  PELVIC ORGANS: Normal.  No visible mass.  Pelvic organs appropriate for patient age.  BONES: Normal.  No bony lesion or fracture.  LUNG BASES: There is a 1.5 cm calcified granuloma in the medial right lower lobe.       No acute abdominal or pelvic abnormality.     JAN CHU MD       Electronically Signed and Approved By: JAN CHU MD on 1/14/2023 at 1:03                 Differential Diagnosis and Discussion:      Joint Pain: Differential diagnosis includes but is not limited to polyarticular arthritis, gout, tendinitis, hemarthrosis, septic arthritis, rheumatoid arthritis, bursitis, degenerative joint disease, joint effusion, autoimmune disorder, trauma, and occult neoplasm.    All labs were reviewed and analyzed by me.  CT scan radiology interpretation was reviewed by me.    MDM  Number of Diagnoses or Management Options  Generalized pain  Diagnosis management comments: I have explained the patient´s condition, diagnoses and treatment plan based on the information available to me at this time. I have answered questions and addressed any concerns. The patient has a good  understanding of the patient´s diagnosis, condition, and treatment plan as can be expected at this point. The vital signs have been stable. The patient´s condition is stable and appropriate for discharge from the emergency department.      The patient will pursue further outpatient evaluation with the primary care physician or other designated or consulting physician as outlined in the discharge instructions. They are agreeable to this plan of care and follow-up instructions have been explained in detail. The patient has received these instructions in written format and have expressed an understanding of the discharge instructions. The patient is aware that any significant change in condition or worsening of symptoms should prompt  an immediate return to this or the closest emergency department or call to 911.         Amount and/or Complexity of Data Reviewed  Clinical lab tests: reviewed and ordered  Tests in the radiology section of CPT®: ordered and reviewed  Tests in the medicine section of CPT®: ordered and reviewed    Risk of Complications, Morbidity, and/or Mortality  Presenting problems: moderate  Diagnostic procedures: moderate  Management options: low    Patient Progress  Patient progress: stable       Patient Care Considerations:    CT HEAD: I considered ordering a noncontrast CT of the head, however She has no head trauma.  No neurodeficits.  No focal weakness      Consultants/Shared Management Plan:    None    Social Determinants of Health:    Patient is independent, reliable, and has access to care.       Disposition and Care Coordination:    Discharged: The patient is suitable and stable for discharge with no need for consideration of observation or admission.    I have explained the patient´s condition, diagnoses and treatment plan based on the information available to me at this time. I have answered questions and addressed any concerns. The patient has a good  understanding of the patient´s diagnosis, condition, and treatment plan as can be expected at this point. The vital signs have been stable. The patient´s condition is stable and appropriate for discharge from the emergency department.      The patient will pursue further outpatient evaluation with the primary care physician or other designated or consulting physician as outlined in the discharge instructions. They are agreeable to this plan of care and follow-up instructions have been explained in detail. The patient has received these instructions in written format and have expressed an understanding of the discharge instructions. The patient is aware that any significant change in condition or worsening of symptoms should prompt an immediate return to this or the  closest emergency department or call to 911.    Final diagnoses:   Generalized pain        ED Disposition     ED Disposition   Discharge    Condition   Stable    Comment   --             This medical record created using voice recognition software.           Maame Anderson, APRN  01/14/23 0719

## 2023-04-11 ENCOUNTER — OFFICE VISIT (OUTPATIENT)
Dept: OBSTETRICS AND GYNECOLOGY | Facility: CLINIC | Age: 43
End: 2023-04-11
Payer: MEDICAID

## 2023-04-11 VITALS
HEIGHT: 66 IN | BODY MASS INDEX: 32.27 KG/M2 | WEIGHT: 200.8 LBS | DIASTOLIC BLOOD PRESSURE: 88 MMHG | SYSTOLIC BLOOD PRESSURE: 140 MMHG

## 2023-04-11 DIAGNOSIS — Z01.419 PAP SMEAR, LOW-RISK: Primary | ICD-10-CM

## 2023-04-11 DIAGNOSIS — Z01.419 WELL WOMAN EXAM: ICD-10-CM

## 2023-04-11 DIAGNOSIS — F17.200 SMOKER: ICD-10-CM

## 2023-04-11 DIAGNOSIS — Z01.419 ROUTINE GYNECOLOGICAL EXAMINATION: ICD-10-CM

## 2023-04-11 DIAGNOSIS — B00.9 POLYMERASE CHAIN REACTION DNA TEST POSITIVE FOR HERPES SIMPLEX VIRUS TYPE 1 (HSV-1): ICD-10-CM

## 2023-04-11 DIAGNOSIS — Z12.31 SCREENING MAMMOGRAM FOR BREAST CANCER: ICD-10-CM

## 2023-04-11 DIAGNOSIS — Z12.11 SCREENING FOR COLON CANCER: ICD-10-CM

## 2023-04-11 DIAGNOSIS — N73.6 PELVIC ADHESIVE DISEASE: ICD-10-CM

## 2023-04-11 DIAGNOSIS — Z11.59 POLYMERASE CHAIN REACTION DNA TEST POSITIVE FOR HERPES SIMPLEX VIRUS TYPE 1 (HSV-1): ICD-10-CM

## 2023-04-11 DIAGNOSIS — J45.20 MILD INTERMITTENT ASTHMA WITHOUT COMPLICATION: ICD-10-CM

## 2023-04-11 DIAGNOSIS — Z90.710 H/O ABDOMINAL HYSTERECTOMY: ICD-10-CM

## 2023-04-11 DIAGNOSIS — Z90.722 STATUS POST BILATERAL SALPINGO-OOPHORECTOMY: ICD-10-CM

## 2023-04-11 DIAGNOSIS — Z11.51 ENCOUNTER FOR SCREENING FOR HUMAN PAPILLOMAVIRUS (HPV): ICD-10-CM

## 2023-04-11 PROBLEM — Z20.2 EXPOSURE TO STD: Status: RESOLVED | Noted: 2021-03-30 | Resolved: 2023-04-11

## 2023-04-11 RX ORDER — ESCITALOPRAM OXALATE 10 MG/1
1 TABLET ORAL DAILY
COMMUNITY
Start: 2023-01-05 | End: 2023-04-11

## 2023-04-11 RX ORDER — CETIRIZINE HYDROCHLORIDE 5 MG/1
TABLET ORAL EVERY 24 HOURS
COMMUNITY
Start: 2022-10-24

## 2023-04-11 NOTE — PROGRESS NOTES
GYN Annual Exam     CC- Here for annual exam   Chief Complaint   Patient presents with   • Annual Exam          Alexandra Cantrell is a 43 y.o. No obstetric history on file. female who presents for annual well woman exam. No LMP recorded (lmp unknown). Patient has had a hysterectomy.    Problems in addition to need for annual: none    HPI: History of Present Illness    PMHX:  Patient Active Problem List   Diagnosis   • H/O abdominal hysterectomy-for chronic DUB   • Smoker   • Bilateral ovarian cysts   • Pelvic adhesive disease   • Asthma   • Disorder of gallbladder   • Rheumatoid arthritis (HCC)   • Polymerase chain reaction DNA test positive for herpes simplex virus type 1 (HSV-1)   • Chronic left shoulder pain   • Vaginal discharge: BV, candida, ureaplasma   • Pelvic pain   • Status post bilateral salpingo-oophorectomy   ; otherwise none    OB History    No obstetric history on file.           Past Medical History:   Diagnosis Date   • Abdominal pain     SCHEDULED FOR SX   • Abscess     LEFT UPPER JAW, TOOTH BROKE OFF   • Anemia    • Arthritis     FINGERS & TOES   • Asthma    • COPD (chronic obstructive pulmonary disease)    • Migraines        Past Surgical History:   Procedure Laterality Date   • APPENDECTOMY     •  SECTION      X3   • CHOLECYSTECTOMY      LAP   • DIAGNOSTIC LAPAROSCOPY N/A 10/25/2018    Procedure: DIAGNOSTIC LAPAROSCOPY with lysis of adhesions;  Surgeon: Joe Hastings MD;  Location: [facility] OR;  Service: Obstetrics/Gynecology   • DIAGNOSTIC LAPAROSCOPY N/A 2022    Procedure: DIAGNOSTIC LAPAROTOMY, LAPAROSCOPIC BILATERAL SALPINGOOPHORECTOMY, EXTENSIVE LYSIS OF ADHESIONS;  Surgeon: Joe Hastings MD;  Location: [facility] OR;  Service: Obstetrics/Gynecology;  Laterality: N/A;   • HYSTERECTOMY      OPEN   • TUBAL ABDOMINAL LIGATION           Current Outpatient Medications:   •  albuterol (PROAIR RESPICLICK) 108 (90 Base) MCG/ACT inhaler, Inhale 2 puffs Every 4  (Four) Hours As Needed for Wheezing (asthma)., Disp: 1 inhaler, Rfl: 11  •  Cetirizine HCl (zyrTEC) 5 MG/5ML solution solution, Daily., Disp: , Rfl:   •  estradiol (ESTRACE) 1 MG tablet, Take 1 tablet by mouth Daily., Disp: , Rfl:   •  ibuprofen (ADVIL,MOTRIN) 800 MG tablet, Take 1 tablet by mouth Every 8 (Eight) Hours As Needed for Mild Pain ., Disp: 30 tablet, Rfl: 0  •  Loratadine 10 MG capsule, 1 capsule Daily., Disp: , Rfl:   •  pregabalin (Lyrica) 75 MG capsule, Take 1 capsule by mouth 2 (Two) Times a Day., Disp: 60 capsule, Rfl: 0  •  Symbicort 160-4.5 MCG/ACT inhaler, Inhale 2 puffs 2 (Two) Times a Day., Disp: , Rfl:   •  TiZANidine (ZANAFLEX) 4 MG capsule, Take 1 tablet by mouth 2 (Two) Times a Day As Needed., Disp: , Rfl:     Allergies   Allergen Reactions   • Aspirin Anaphylaxis   • Sulfamethoxazole-Trimethoprim Anaphylaxis and Hives   • Gabapentin Other (See Comments)     Behavioral changes, aggressive behavior UNABLE TO EAT WHILE TAKING THE MEDICINE       Social History     Tobacco Use   • Smoking status: Every Day     Packs/day: 1.00     Years: 25.00     Pack years: 25.00     Types: Cigarettes   • Smokeless tobacco: Never   Vaping Use   • Vaping Use: Never used   Substance Use Topics   • Alcohol use: No   • Drug use: No       Alexandra Cantrell  reports that she has been smoking cigarettes. She has a 25.00 pack-year smoking history. She has never used smokeless tobacco..       Family History   Problem Relation Age of Onset   • Heart attack Mother    • Diabetes Mother    • Heart disease Father    • Stroke Father    • Lung cancer Father    • Heart attack Sister    • Heart attack Brother    • Diabetes Maternal Grandmother    • Diabetes Paternal Grandmother    • Heart attack Brother    • Heart attack Brother    • Heart attack Sister    • Heart attack Sister    • Ovarian cancer Sister        Review of Systems    Patient reports that she is not currently experiencing any symptoms of urinary  "incontinence.      noTESTED FOR CHLAMYDIA?    EXAM:  /88   Ht 167.6 cm (65.98\")   Wt 91.1 kg (200 lb 12.8 oz)   LMP  (LMP Unknown)   BMI 32.43 kg/m²     Labs:   Lab Results (last 24 hours)     ** No results found for the last 24 hours. **          Physical Exam  Vitals and nursing note reviewed. Exam conducted with a chaperone present.   Constitutional:       General: She is not in acute distress.     Appearance: She is well-developed. She is not diaphoretic.   HENT:      Head: Normocephalic and atraumatic.      Nose: Nose normal.   Eyes:      Extraocular Movements: Extraocular movements intact.   Cardiovascular:      Rate and Rhythm: Normal rate.   Pulmonary:      Effort: Pulmonary effort is normal.   Chest:   Breasts:     Breasts are symmetrical.      Right: Normal. No mass, nipple discharge, skin change or tenderness.      Left: Normal. No mass, nipple discharge, skin change or tenderness.   Abdominal:      General: There is no distension.      Palpations: Abdomen is soft. There is no mass.      Tenderness: There is no abdominal tenderness. There is no guarding.   Genitourinary:     General: Normal vulva.      Pubic Area: No rash.       Vagina: Normal. No vaginal discharge.      Uterus: Absent.       Adnexa: Right adnexa normal and left adnexa normal.            Comments: Cuff wnl  Musculoskeletal:         General: No tenderness or deformity. Normal range of motion.      Cervical back: Normal range of motion.   Lymphadenopathy:      Upper Body:      Right upper body: No axillary adenopathy.      Left upper body: No axillary adenopathy.   Skin:     General: Skin is warm and dry.      Coloration: Skin is not pale.      Findings: No erythema or rash.   Neurological:      Mental Status: She is alert and oriented to person, place, and time.   Psychiatric:         Behavior: Behavior normal.         Thought Content: Thought content normal.         Judgment: Judgment normal.            As part of wellness and " prevention, the following topics were discussed with the patient: healthy weight, substance abuse/misuse, mental health, encouraging self breast exam, and other counseling and guidance done:  Nutrition, physical activity, healthy weight, injury prevention, misuse of tobacco, alcohol and drugs, sexual behavior and STDs, contraception, dental health, mental health, immunizations breast cancer screening and exams.    Assessment     1) GYN annual well woman exam.   2) PAP done today? Yes  3) problems addressed: none    MAMMOGRAM UP TO DATE IF AGE APPROPRIATE?  No  (if no, pt encouraged to schedule; risks of undiagnosed breast cancer reviewed with pt if she does not have a mammogram)    COLONOSCOPY UP TO DATE IF AGE APPROPRIATE? No  (if no, risks of undiagnosed colon cancer reviewed with pt if she fails to have the colonoscopy)             Plan       Follow up prn or one year.    Pt instructed to call for results of any testing done today and that failure to call if she has not heard from us could result in inadequate treatment.  Pt verbalized her understanding.     Diagnoses and all orders for this visit:    1. H/O abdominal hysterectomy-for chronic DUB (Primary)    2. Pelvic adhesive disease    3. Status post bilateral salpingo-oophorectomy    4. Mild intermittent asthma without complication    5. Smoker    6. Polymerase chain reaction DNA test positive for herpes simplex virus type 1 (HSV-1)    7. Screening mammogram for breast cancer  -     Mammo Screening Digital Tomosynthesis Bilateral With CAD; Future    8. Screening for colon cancer  -     Ambulatory Referral For Screening Colonoscopy    9. Well woman exam        RTO Return in about 1 year (around 4/11/2024) for Annual physical.        Joe Hastings MD  04/11/23  15:19 EDT

## 2023-04-18 LAB
CYTOLOGIST CVX/VAG CYTO: ABNORMAL
CYTOLOGY CVX/VAG DOC CYTO: ABNORMAL
CYTOLOGY CVX/VAG DOC THIN PREP: ABNORMAL
DX ICD CODE: ABNORMAL
DX ICD CODE: ABNORMAL
HIV 1 & 2 AB SER-IMP: ABNORMAL
HPV I/H RISK 4 DNA CVX QL PROBE+SIG AMP: POSITIVE
HPV16 DNA CVX QL PROBE+SIG AMP: NEGATIVE
HPV18+45 E6+E7 MRNA CVX QL NAA+PROBE: POSITIVE
OTHER STN SPEC: ABNORMAL
PATHOLOGIST CVX/VAG CYTO: ABNORMAL
RECOM F/U CVX/VAG CYTO: ABNORMAL
STAT OF ADQ CVX/VAG CYTO-IMP: ABNORMAL

## 2023-04-20 PROBLEM — R87.612 LGSIL ON PAP SMEAR OF CERVIX: Status: ACTIVE | Noted: 2023-04-20

## 2023-04-25 ENCOUNTER — APPOINTMENT (OUTPATIENT)
Dept: GENERAL RADIOLOGY | Facility: HOSPITAL | Age: 43
End: 2023-04-25
Payer: MEDICAID

## 2023-04-25 ENCOUNTER — HOSPITAL ENCOUNTER (EMERGENCY)
Facility: HOSPITAL | Age: 43
Discharge: HOME | End: 2023-04-25
Attending: EMERGENCY MEDICINE | Admitting: EMERGENCY MEDICINE
Payer: MEDICAID

## 2023-04-25 VITALS
RESPIRATION RATE: 18 BRPM | HEIGHT: 66 IN | HEART RATE: 65 BPM | OXYGEN SATURATION: 98 % | BODY MASS INDEX: 32.14 KG/M2 | DIASTOLIC BLOOD PRESSURE: 83 MMHG | TEMPERATURE: 97.7 F | SYSTOLIC BLOOD PRESSURE: 131 MMHG | WEIGHT: 200 LBS

## 2023-04-25 DIAGNOSIS — S80.02XA CONTUSION OF LEFT KNEE, INITIAL ENCOUNTER: Primary | ICD-10-CM

## 2023-04-25 DIAGNOSIS — M25.562 PAIN AND SWELLING OF LEFT KNEE: ICD-10-CM

## 2023-04-25 DIAGNOSIS — M25.462 PAIN AND SWELLING OF LEFT KNEE: ICD-10-CM

## 2023-04-25 PROCEDURE — 99284 EMERGENCY DEPT VISIT MOD MDM: CPT

## 2023-04-25 PROCEDURE — 73562 X-RAY EXAM OF KNEE 3: CPT

## 2023-04-25 RX ORDER — HYDROCODONE BITARTRATE AND ACETAMINOPHEN 7.5; 325 MG/1; MG/1
1 TABLET ORAL ONCE
Status: COMPLETED | OUTPATIENT
Start: 2023-04-25 | End: 2023-04-25

## 2023-04-25 RX ORDER — FOLIC ACID 1 MG/1
1 TABLET ORAL DAILY
COMMUNITY
Start: 2023-04-18

## 2023-04-25 RX ORDER — ERGOCALCIFEROL 1.25 MG/1
1 CAPSULE ORAL WEEKLY
COMMUNITY
Start: 2023-04-18

## 2023-04-25 RX ORDER — ATORVASTATIN CALCIUM 10 MG/1
1 TABLET, FILM COATED ORAL DAILY
COMMUNITY
Start: 2023-04-18

## 2023-04-25 RX ADMIN — HYDROCODONE BITARTRATE AND ACETAMINOPHEN 1 TABLET: 7.5; 325 TABLET ORAL at 20:17

## 2023-04-25 NOTE — Clinical Note
[facility] EMERGENCY ROOM  [address]  [place] [address]  Phone: [phone]    Alexandra Cantrell was seen and treated in our emergency department on 4/25/2023.  She may return to work on 04/29/2023.         Thank you for choosing [facility].    Maame Anderson APRN

## 2023-04-25 NOTE — ED PROVIDER NOTES
Time: 7:50 PM EDT  Date of encounter:  4/25/2023  Independent Historian/Clinical History and Information was obtained by:   Patient and Family  Chief Complaint: Left knee pain    History is limited by: N/A    History of Present Illness:  Patient is a 43 y.o. year old female who presents to the emergency department for evaluation of left knee pain      Knee Pain  Location:  Knee  Time since incident:  6 days  Injury: yes    Mechanism of injury comment:  Patient states she dropped a piece of firewood on her knee last Wednesday and since then has had pain and swelling.  Now feels like there is swelling and a knot on the back of her knee as well  Knee location:  L knee  Pain details:     Quality:  Aching and throbbing    Radiates to:  Does not radiate    Severity:  Severe    Onset quality:  Gradual    Duration:  6 days    Timing:  Constant    Progression:  Worsening  Chronicity:  New  Dislocation: no    Foreign body present:  No foreign bodies  Tetanus status:  Up to date  Prior injury to area:  No  Relieved by:  Rest and immobilization  Worsened by:  Extension, flexion and bearing weight (Touch)  Ineffective treatments:  NSAIDs and rest  Associated symptoms: decreased ROM ( Painful to move) and swelling    Associated symptoms: no back pain, no fatigue, no fever, no itching, no muscle weakness, no neck pain, no numbness, no stiffness and no tingling        Patient Care Team  Primary Care Provider: Tahmina Murray MD    Past Medical History:     Allergies   Allergen Reactions   • Aspirin Anaphylaxis   • Sulfamethoxazole-Trimethoprim Anaphylaxis and Hives   • Gabapentin Other (See Comments)     Behavioral changes, aggressive behavior UNABLE TO EAT WHILE TAKING THE MEDICINE     Past Medical History:   Diagnosis Date   • Abdominal pain     SCHEDULED FOR SX   • Abscess     LEFT UPPER JAW, TOOTH BROKE OFF   • Anemia    • Arthritis     FINGERS & TOES   • Asthma    • COPD (chronic obstructive pulmonary disease)    • Degenerative  disc disease, cervical    • Migraines      Past Surgical History:   Procedure Laterality Date   • APPENDECTOMY     •  SECTION      X3   • CHOLECYSTECTOMY      LAP   • DIAGNOSTIC LAPAROSCOPY N/A 10/25/2018    Procedure: DIAGNOSTIC LAPAROSCOPY with lysis of adhesions;  Surgeon: Joe Hastings MD;  Location: [facility] OR;  Service: Obstetrics/Gynecology   • DIAGNOSTIC LAPAROSCOPY N/A 2022    Procedure: DIAGNOSTIC LAPAROTOMY, LAPAROSCOPIC BILATERAL SALPINGOOPHORECTOMY, EXTENSIVE LYSIS OF ADHESIONS;  Surgeon: Joe Hastings MD;  Location: [facility] OR;  Service: Obstetrics/Gynecology;  Laterality: N/A;   • HYSTERECTOMY      OPEN   • TUBAL ABDOMINAL LIGATION       Family History   Problem Relation Age of Onset   • Heart attack Mother    • Diabetes Mother    • Heart disease Father    • Stroke Father    • Lung cancer Father    • Heart attack Sister    • Heart attack Brother    • Diabetes Maternal Grandmother    • Diabetes Paternal Grandmother    • Heart attack Brother    • Heart attack Brother    • Heart attack Sister    • Heart attack Sister    • Ovarian cancer Sister        Home Medications:  Prior to Admission medications    Medication Sig Start Date End Date Taking? Authorizing Provider   albuterol (PROAIR RESPICLICK) 108 (90 Base) MCG/ACT inhaler Inhale 2 puffs Every 4 (Four) Hours As Needed for Wheezing (asthma). 19   Quin Paulino APRN   Cetirizine HCl (zyrTEC) 5 MG/5ML solution solution Daily. 10/24/22   Provider, MD Darell   estradiol (ESTRACE) 1 MG tablet Take 1 tablet by mouth Daily.    Emergency, Nurse Autumn RN   ibuprofen (ADVIL,MOTRIN) 800 MG tablet Take 1 tablet by mouth Every 8 (Eight) Hours As Needed for Mild Pain . 19   Quin Paulino APRN   Loratadine 10 MG capsule 1 capsule Daily.    Emergency, Nurse Autumn RN   pregabalin (Lyrica) 75 MG capsule Take 1 capsule by mouth 2 (Two) Times a Day. 21   Leigh Patel APRN   Symbicort 160-4.5 MCG/ACT  "inhaler Inhale 2 puffs 2 (Two) Times a Day. 3/18/22   ProviderDarell MD   TiZANidine (ZANAFLEX) 4 MG capsule Take 1 tablet by mouth 2 (Two) Times a Day As Needed.    Provider, MD Darell        Social History:   Social History     Tobacco Use   • Smoking status: Every Day     Packs/day: 1.50     Years: 25.00     Pack years: 37.50     Types: Cigarettes   • Smokeless tobacco: Never   Vaping Use   • Vaping Use: Never used   Substance Use Topics   • Alcohol use: No   • Drug use: No         Review of Systems:  Review of Systems   Constitutional: Negative for fatigue and fever.   Respiratory: Negative for cough and shortness of breath.    Cardiovascular: Negative for chest pain.   Gastrointestinal: Negative for abdominal pain.   Genitourinary: Negative for dysuria and flank pain.   Musculoskeletal: Positive for arthralgias ( Left knee), gait problem ( Due to left knee pain it is too painful) and joint swelling ( Left knee). Negative for back pain, neck pain and stiffness.   Skin: Positive for wound ( Small abrasion to left knee). Negative for itching.   Neurological: Negative for weakness and numbness.   Hematological: Negative.    Psychiatric/Behavioral: Negative.    All other systems reviewed and are negative.       Physical Exam:  /66 (BP Location: Right arm, Patient Position: Sitting)   Pulse 72   Temp 97.7 °F (36.5 °C) (Oral)   Resp 16   Ht 167.6 cm (66\")   Wt 90.7 kg (200 lb)   LMP  (LMP Unknown)   SpO2 96%   BMI 32.28 kg/m²     Physical Exam  Vitals and nursing note reviewed.   Constitutional:       Appearance: Normal appearance.   HENT:      Head: Atraumatic.   Cardiovascular:      Pulses: Normal pulses.   Pulmonary:      Effort: Pulmonary effort is normal.   Musculoskeletal:         General: Swelling and tenderness ( Diffuse anterior and posterior knee) present.      Cervical back: Normal range of motion.      Comments: Then not described by patient just appears to be the posterior knee " space but no definite hematoma or abnormality    Painful flexion and extension   Skin:     General: Skin is warm and dry.      Capillary Refill: Capillary refill takes less than 2 seconds.      Comments: Tiny scabbed abrasion to inferior patella area   Neurological:      Mental Status: She is alert and oriented to person, place, and time.      Sensory: No sensory deficit.      Motor: No weakness.   Psychiatric:         Mood and Affect: Mood normal.         Behavior: Behavior normal.          Procedures:  Procedures      Medical Decision Making:      Comorbidities that affect care:    Asthma, COPD, Smoking    External Notes reviewed:    None      The following orders were placed and all results were independently analyzed by me:  Orders Placed This Encounter   Procedures   • [facility] 05.  Knee Immobilizer, 11.  Crutches   • XR Knee 3 View Left   • Obtain & Apply The Following- Lower extremity; Knee immobilizer   • Crutches (fit & training)       Medications Given in the Emergency Department:  Medications   HYDROcodone-acetaminophen (NORCO) 7.5-325 MG per tablet 1 tablet (1 tablet Oral Given 4/25/23 2017)        ED Course:    ED Course as of 04/25/23 2119 Tue Apr 25, 2023 2111 XR Knee 3 View Left  No acute fracture or malalignment [DS]   2118 Pain medication has helped per patient report [DS]      ED Course User Index  [DS] Maame Anderosn, STEFANI       Labs:    Lab Results (last 24 hours)     ** No results found for the last 24 hours. **           Imaging:    XR Knee 3 View Left    Result Date: 4/25/2023  PROCEDURE: XR KNEE 3 VW LEFT  COMPARISON: None.  INDICATIONS: Left knee pain; the pt. states an unspecified wooden object fell on her left knee.  FINDINGS: 3 nonweightbearing views of the left knee were obtained.  No acute fracture or acute malalignment is identified.  Minimal, if any, joint effusion is seen.  If symptoms or clinical concerns persist, consider imaging follow-up.       No acute  fracture or acute malalignment is identified.     Please note that portions of this note were completed with a voice recognition program.  SOBEIDA SARAVIA JR, MD       Electronically Signed and Approved By: SOBEIDA SARAVIA JR, MD on 4/25/2023 at 21:06                  Differential Diagnosis and Discussion:    Joint Pain: Differential diagnosis includes but is not limited to polyarticular arthritis, gout, tendinitis, hemarthrosis, septic arthritis, rheumatoid arthritis, bursitis, degenerative joint disease, joint effusion, autoimmune disorder, trauma, and occult neoplasm.    All X-rays impressions were independently interpreted by me.    MDM  Number of Diagnoses or Management Options  Contusion of left knee, initial encounter  Pain and swelling of left knee  Diagnosis management comments: I have explained the patient´s condition, diagnoses and treatment plan based on the information available to me at this time. I have answered questions and addressed any concerns. The patient has a good  understanding of the patient´s diagnosis, condition, and treatment plan as can be expected at this point. The vital signs have been stable. The patient´s condition is stable and appropriate for discharge from the emergency department.      The patient will pursue further outpatient evaluation with the primary care physician or other designated or consulting physician as outlined in the discharge instructions. They are agreeable to this plan of care and follow-up instructions have been explained in detail. The patient has received these instructions in written format and have expressed an understanding of the discharge instructions. The patient is aware that any significant change in condition or worsening of symptoms should prompt an immediate return to this or the closest emergency department or call to 911.         Amount and/or Complexity of Data Reviewed  Tests in the radiology section of CPT®: reviewed and ordered  Tests in the  medicine section of CPT®: ordered and reviewed    Risk of Complications, Morbidity, and/or Mortality  Presenting problems: low  Diagnostic procedures: low  Management options: low    Patient Progress  Patient progress: stable       Patient Care Considerations:    NARCOTICS: I considered prescribing opiate pain medication as an outpatient, however No acute bony abnormality noted on imaging      Consultants/Shared Management Plan:    None    Social Determinants of Health:    Patient is independent, reliable, and has access to care.       Disposition and Care Coordination:    Discharged: The patient is suitable and stable for discharge with no need for consideration of observation or admission.    I have explained the patient´s condition, diagnoses and treatment plan based on the information available to me at this time. I have answered questions and addressed any concerns. The patient has a good  understanding of the patient´s diagnosis, condition, and treatment plan as can be expected at this point. The vital signs have been stable. The patient´s condition is stable and appropriate for discharge from the emergency department.      The patient will pursue further outpatient evaluation with the primary care physician or other designated or consulting physician as outlined in the discharge instructions. They are agreeable to this plan of care and follow-up instructions have been explained in detail. The patient has received these instructions in written format and have expressed an understanding of the discharge instructions. The patient is aware that any significant change in condition or worsening of symptoms should prompt an immediate return to this or the closest emergency department or call to 911.  I have explained discharge medications and the need for follow up with the patient/caretakers. This was also printed in the discharge instructions. Patient was discharged with the following medications and follow up:       Medication List      New Prescriptions    diclofenac 50 MG EC tablet  Commonly known as: VOLTAREN  Take 1 tablet by mouth 3 (Three) Times a Day As Needed (Pain).           Where to Get Your Medications      These medications were sent to [facility], [place], [place] - [address] [facility] - [phone]  - [phone] FX  [address] [place] [address]    Phone: [phone]   · diclofenac 50 MG EC tablet      Tahmina Murray MD  [address] [id number] [phone]    Schedule an appointment as soon as possible for a visit       Lenny Rodas MD  [address] [place], [place] [id number] [phone]    Schedule an appointment as soon as possible for a visit          Final diagnoses:   Contusion of left knee, initial encounter   Pain and swelling of left knee        ED Disposition     ED Disposition   Discharge    Condition   Stable    Comment   --             This medical record created using voice recognition software.           Maame Anderson, STEFANI  04/25/23 [id number]

## 2023-04-26 NOTE — DISCHARGE INSTRUCTIONS
X-rays were negative and did not show any acute abnormality.    Rest.  Ice.  Knee immobilizer for compression.  Elevate.  Use crutches for ambulation until well.    Take medication as prescribed for pain.    Follow-up with PCP or orthopedic surgeon if no better for any additional testing or imaging as indicated    Return for new or worsening symptoms

## 2023-04-30 PROBLEM — R87.622 LGSIL PAP SMEAR OF VAGINA: Status: ACTIVE | Noted: 2023-04-20

## 2023-05-01 NOTE — PROGRESS NOTES
"Colposcopy Procedure Note    /74   Ht 167.6 cm (65.98\")   Wt 90.7 kg (200 lb)   LMP  (LMP Unknown)   BMI 32.30 kg/m²     Indications: Most recent Pap smear showed: LGSIL on pap of vaginal cuff.  Prior cervical/vaginal disease: normal exam without visible pathology.  Prior cervical treatment: hysterectomy.    Procedure Details   The risks and benefits of the procedure and Verbal informed consent obtained.    Speculum placed in vagina and excellent visualization of cervix achieved, cervix swabbed x 3 with acetic acid solution and Lugol's solution     Findings:  Physical Exam  Vaginal inspection: normal without visible lesions.  Vulvar colposcopy: vulvar colposcopy not performed.      Specimens: none    Complications: none.    PT TOLERATED PROCEDURE WELL.     I saw the patient with a face mask, gloves and eye protection  The patient herself was masked.  Social distancing was observed as appropriate. All COVID precautions observed.     IMPRESSION:   NEGATIVE COLPOSCOPY    Plan:  rto 1 yr for annual.       Joe Hastings MD  13:27 EDT  05/03/23    "

## 2023-05-03 ENCOUNTER — OFFICE VISIT (OUTPATIENT)
Dept: OBSTETRICS AND GYNECOLOGY | Facility: CLINIC | Age: 43
End: 2023-05-03
Payer: MEDICAID

## 2023-05-03 VITALS
HEIGHT: 66 IN | BODY MASS INDEX: 32.14 KG/M2 | WEIGHT: 200 LBS | SYSTOLIC BLOOD PRESSURE: 128 MMHG | DIASTOLIC BLOOD PRESSURE: 74 MMHG

## 2023-05-03 DIAGNOSIS — Z90.710 H/O ABDOMINAL HYSTERECTOMY: Primary | ICD-10-CM

## 2023-05-03 DIAGNOSIS — R87.622 LGSIL PAP SMEAR OF VAGINA: ICD-10-CM

## 2023-05-03 DIAGNOSIS — F17.200 SMOKER: ICD-10-CM

## 2023-05-03 RX ORDER — ESTRADIOL 1 MG/G
1 GEL TOPICAL DAILY
COMMUNITY

## 2023-05-03 RX ORDER — ALBUTEROL SULFATE 90 UG/1
AEROSOL, METERED RESPIRATORY (INHALATION)
COMMUNITY

## 2023-05-03 RX ORDER — BUDESONIDE AND FORMOTEROL FUMARATE DIHYDRATE 160; 4.5 UG/1; UG/1
2 AEROSOL RESPIRATORY (INHALATION) 2 TIMES DAILY
COMMUNITY

## 2023-05-03 RX ORDER — PREGABALIN 75 MG/1
1 CAPSULE ORAL 2 TIMES DAILY
COMMUNITY

## 2023-05-31 ENCOUNTER — HOSPITAL ENCOUNTER (OUTPATIENT)
Dept: MAMMOGRAPHY | Facility: HOSPITAL | Age: 43
Discharge: HOME | End: 2023-05-31
Admitting: OBSTETRICS & GYNECOLOGY

## 2023-05-31 DIAGNOSIS — Z12.31 SCREENING MAMMOGRAM FOR BREAST CANCER: ICD-10-CM

## 2023-05-31 PROCEDURE — 77063 BREAST TOMOSYNTHESIS BI: CPT

## 2023-05-31 PROCEDURE — 77067 SCR MAMMO BI INCL CAD: CPT

## 2023-08-10 ENCOUNTER — HOSPITAL ENCOUNTER (OUTPATIENT)
Dept: GENERAL RADIOLOGY | Facility: HOSPITAL | Age: 43
Discharge: HOME | End: 2023-08-10
Admitting: FAMILY MEDICINE
Payer: MEDICAID

## 2023-08-10 ENCOUNTER — TRANSCRIBE ORDERS (OUTPATIENT)
Dept: ADMINISTRATIVE | Facility: HOSPITAL | Age: 43
End: 2023-08-10
Payer: MEDICAID

## 2023-08-10 DIAGNOSIS — M54.2 NECK PAIN: Primary | ICD-10-CM

## 2023-08-10 DIAGNOSIS — M54.2 NECK PAIN: ICD-10-CM

## 2023-08-10 PROCEDURE — 72040 X-RAY EXAM NECK SPINE 2-3 VW: CPT

## 2023-12-16 ENCOUNTER — HOSPITAL ENCOUNTER (EMERGENCY)
Facility: HOSPITAL | Age: 43
Discharge: HOME | End: 2023-12-16
Attending: EMERGENCY MEDICINE
Payer: MEDICAID

## 2023-12-16 VITALS
RESPIRATION RATE: 16 BRPM | TEMPERATURE: 97.8 F | DIASTOLIC BLOOD PRESSURE: 68 MMHG | HEART RATE: 66 BPM | HEIGHT: 66 IN | OXYGEN SATURATION: 98 % | BODY MASS INDEX: 33.13 KG/M2 | SYSTOLIC BLOOD PRESSURE: 130 MMHG | WEIGHT: 206.13 LBS

## 2023-12-16 DIAGNOSIS — S61.216A LACERATION OF RIGHT LITTLE FINGER WITHOUT FOREIGN BODY WITHOUT DAMAGE TO NAIL, INITIAL ENCOUNTER: Primary | ICD-10-CM

## 2023-12-16 PROCEDURE — 99282 EMERGENCY DEPT VISIT SF MDM: CPT

## 2023-12-16 NOTE — ED PROVIDER NOTES
Time: 1:37 AM EST  Date of encounter:  2023  Independent Historian/Clinical History and Information was obtained by:   Patient    History is limited by: N/A    Chief Complaint: finger laceration      History of Present Illness:  Patient is a 43 y.o. year old female who presents to the emergency department for evaluation of right pinky laceration. She says that she was washing dishes on a broken glass when it cut her. She was able to stop the bleeding.     HPI    Patient Care Team  Primary Care Provider: Adiel Murray MD    Past Medical History:     Allergies   Allergen Reactions    Aspirin Anaphylaxis     Other reaction(s): anaphylaxis      Sulfamethoxazole-Trimethoprim Anaphylaxis and Hives     Other reaction(s): hives    Gabapentin Other (See Comments)     Behavioral changes, aggressive behavior UNABLE TO EAT WHILE TAKING THE MEDICINE     Past Medical History:   Diagnosis Date    Abdominal pain     SCHEDULED FOR SX    Abscess     LEFT UPPER JAW, TOOTH BROKE OFF    Anemia     Arthritis     FINGERS & TOES    Asthma     COPD (chronic obstructive pulmonary disease)     Degenerative disc disease, cervical     Migraines      Past Surgical History:   Procedure Laterality Date    APPENDECTOMY       SECTION      X3    CHOLECYSTECTOMY      LAP    DIAGNOSTIC LAPAROSCOPY N/A 10/25/2018    Procedure: DIAGNOSTIC LAPAROSCOPY with lysis of adhesions;  Surgeon: Joe Hastings MD;  Location: [facility] OR;  Service: Obstetrics/Gynecology    DIAGNOSTIC LAPAROSCOPY N/A 2022    Procedure: DIAGNOSTIC LAPAROTOMY, LAPAROSCOPIC BILATERAL SALPINGOOPHORECTOMY, EXTENSIVE LYSIS OF ADHESIONS;  Surgeon: Joe Hastings MD;  Location: [facility] OR;  Service: Obstetrics/Gynecology;  Laterality: N/A;    HYSTERECTOMY      OPEN    TUBAL ABDOMINAL LIGATION       Family History   Problem Relation Age of Onset    Heart attack Mother     Diabetes Mother     Heart disease Father     Stroke Father     Lung cancer  Father     Heart attack Sister     Heart attack Sister     Heart attack Sister     Ovarian cancer Sister     Heart attack Brother     Heart attack Brother     Heart attack Brother     Diabetes Maternal Grandmother     Diabetes Paternal Grandmother     Breast cancer Neg Hx        Home Medications:  Prior to Admission medications    Medication Sig Start Date End Date Taking? Authorizing Provider   albuterol (PROAIR RESPICLICK) 108 (90 Base) MCG/ACT inhaler Inhale 2 puffs Every 4 (Four) Hours As Needed for Wheezing (asthma). 7/31/19   Quin Paulino APRN   albuterol sulfate HFA (ProAir HFA) 108 (90 Base) MCG/ACT inhaler INHALE 1 PUFF BY MOUTH EVERY 4 HOURS AS NEEDED    Darell Mart MD   budesonide-formoterol (Symbicort) 160-4.5 MCG/ACT inhaler Inhale 2 puffs 2 (Two) Times a Day.    Darell Mart MD   Cetirizine HCl (zyrTEC) 5 MG/5ML solution solution Daily. 10/24/22   Darell Mart MD   estradiol (ESTRACE) 1 MG tablet Take 1 tablet by mouth Daily.    Emergency, Nurse Epic, RN   estradiol 1 MG/GM gel Take 1 tablet by mouth Daily.    Darell Mart MD   folic acid (FOLVITE) 1 MG tablet Take 1 tablet by mouth Daily. 4/18/23   Darell Mart MD   Loratadine 10 MG capsule 1 capsule Daily.    Emergency, Nurse Autumn, RN   pregabalin (Lyrica) 75 MG capsule Take 1 capsule by mouth 2 (Two) Times a Day. 6/17/21   Leigh Patel APRN   pregabalin (LYRICA) 75 MG capsule Take 1 capsule by mouth 2 (Two) Times a Day.    Darell Mart MD   Symbicort 160-4.5 MCG/ACT inhaler Inhale 2 puffs 2 (Two) Times a Day. 3/18/22   Darell Mart MD   TiZANidine (ZANAFLEX) 4 MG capsule Take 1 tablet by mouth 2 (Two) Times a Day As Needed.    Darell Mart MD   vitamin D (ERGOCALCIFEROL) 1.25 MG (71978 UT) capsule capsule Take 1 capsule by mouth 1 (One) Time Per Week. 4/18/23   Darell Mart MD        Social History:   Social History     Tobacco Use    Smoking status: Every Day  "    Packs/day: 1.50     Years: 25.00     Additional pack years: 0.00     Total pack years: 37.50     Types: Cigarettes    Smokeless tobacco: Never   Vaping Use    Vaping Use: Never used   Substance Use Topics    Alcohol use: No    Drug use: No         Review of Systems:  Review of Systems   Constitutional:  Negative for chills and fever.   HENT:  Negative for ear pain.    Eyes:  Negative for pain.   Respiratory:  Negative for cough and shortness of breath.    Cardiovascular:  Negative for chest pain.   Gastrointestinal:  Negative for abdominal pain, diarrhea, nausea and vomiting.   Genitourinary:  Negative for dysuria.   Musculoskeletal:  Negative for arthralgias.   Skin:  Positive for wound. Negative for rash.   Neurological:  Negative for headaches.        Physical Exam:  /68 (BP Location: Left arm, Patient Position: Sitting)   Pulse 66   Temp 97.8 °F (36.6 °C) (Oral)   Resp 16   Ht 167.6 cm (66\")   Wt 93.5 kg (206 lb 2.1 oz)   LMP  (LMP Unknown)   SpO2 98%   BMI 33.27 kg/m²     Physical Exam  Vitals and nursing note reviewed.   Constitutional:       Appearance: Normal appearance.   HENT:      Head: Normocephalic and atraumatic.      Nose: Nose normal.   Eyes:      Extraocular Movements: Extraocular movements intact.      Conjunctiva/sclera: Conjunctivae normal.      Pupils: Pupils are equal, round, and reactive to light.   Cardiovascular:      Rate and Rhythm: Normal rate and regular rhythm.      Heart sounds: Normal heart sounds.   Pulmonary:      Effort: Pulmonary effort is normal.      Breath sounds: Normal breath sounds.   Abdominal:      General: Abdomen is flat.      Palpations: Abdomen is soft.   Musculoskeletal:         General: Normal range of motion.      Cervical back: Normal range of motion and neck supple.   Skin:     Comments: 2cm laceration on the extensor aspect of the right pinky.   Neurological:      General: No focal deficit present.      Mental Status: She is alert and oriented " to person, place, and time.   Psychiatric:         Mood and Affect: Mood normal.         Behavior: Behavior normal.         Thought Content: Thought content normal.         Judgment: Judgment normal.          Procedures:  Laceration Repair    Date/Time: 12/16/2023 1:39 AM    Performed by: Nolberto Bowden PA  Authorized by: Elan Almanzar MD    Consent:     Consent obtained:  Verbal    Consent given by:  Patient    Risks, benefits, and alternatives were discussed: yes      Risks discussed:  Pain and infection    Alternatives discussed:  No treatment  Anesthesia:     Anesthesia method:  None  Laceration details:     Location:  Hand    Hand location: Right pinky.    Length (cm):  2  Pre-procedure details:     Preparation:  Patient was prepped and draped in usual sterile fashion  Exploration:     Hemostasis achieved with:  Direct pressure  Treatment:     Area cleansed with:  Povidone-iodine and saline    Amount of cleaning:  Standard    Irrigation solution:  Sterile saline    Irrigation method:  Syringe    Debridement:  None  Skin repair:     Repair method:  Tissue adhesive and Steri-Strips  Approximation:     Approximation:  Close  Repair type:     Repair type:  Simple  Post-procedure details:     Dressing:  Adhesive bandage    Procedure completion:  Tolerated        Medical Decision Making:      Comorbidities that affect care:    None    External Notes reviewed:    None      The following orders were placed and all results were independently analyzed by me:  Orders Placed This Encounter   Procedures    Laceration Repair       Medications Given in the Emergency Department:  Medications - No data to display     ED Course:         Labs:    Lab Results (last 24 hours)       ** No results found for the last 24 hours. **             Imaging:    No Radiology Exams Resulted Within Past 24 Hours      Differential Diagnosis and Discussion:    Laceration: Laceration was evaluated for arterial injury, ligamentous damage, and other  neurovascular injury.        MDM  The patient presented with a laceration in need of repair. See laceration repair note for details. The wound was irrigated with copious normal saline irrigation. Dermabond and Steri-strips were used to approximate the wound edges. Tetanus was not given. The patient tolerated the procedure well. Acute bleeding has ceased and the wound was approximated in the emergency department. Patient was counseled to keep the wound clean, dry, and out of the sun. Patient was counseled to change dressings daily. Patient was advised to return to the ED for worsening erythema, pain, swelling, fever, excessive drainage or signs of infection. They were counseled to follow up for suture removal as described in the discharge instructions. Patient verbalizes understanding and agrees to follow up as instructed.              Patient Care Considerations:    ANTIBIOTICS: I considered prescribing antibiotics as an outpatient however no bacterial focus of infection was found.      Consultants/Shared Management Plan:    None    Social Determinants of Health:    Patient is independent, reliable, and has access to care.       Disposition and Care Coordination:    Discharged: The patient is suitable and stable for discharge with no need for consideration of observation or admission.    I have explained the patient´s condition, diagnoses and treatment plan based on the information available to me at this time. I have answered questions and addressed any concerns. The patient has a good  understanding of the patient´s diagnosis, condition, and treatment plan as can be expected at this point. The vital signs have been stable. The patient´s condition is stable and appropriate for discharge from the emergency department.      The patient will pursue further outpatient evaluation with the primary care physician or other designated or consulting physician as outlined in the discharge instructions. They are agreeable to  this plan of care and follow-up instructions have been explained in detail. The patient has received these instructions in written format and have expressed an understanding of the discharge instructions. The patient is aware that any significant change in condition or worsening of symptoms should prompt an immediate return to this or the closest emergency department or call to 911.  I have explained discharge medications and the need for follow up with the patient/caretakers. This was also printed in the discharge instructions. Patient was discharged with the following medications and follow up:      Medication List      No changes were made to your prescriptions during this visit.      Adiel Murray MD  [address] [place] [id number]  [phone]    In 1 week  For wound re-check       Final diagnoses:   Laceration of right little finger without foreign body without damage to nail, initial encounter        ED Disposition       ED Disposition   Discharge    Condition   Stable    Comment   --               This medical record created using voice recognition software.             Nolberto Bowden PA  12/16/23 0230

## 2024-03-14 ENCOUNTER — TRANSCRIBE ORDERS (OUTPATIENT)
Dept: ADMINISTRATIVE | Facility: HOSPITAL | Age: 44
End: 2024-03-14
Payer: MEDICAID

## 2024-03-14 ENCOUNTER — HOSPITAL ENCOUNTER (OUTPATIENT)
Dept: GENERAL RADIOLOGY | Facility: HOSPITAL | Age: 44
Discharge: HOME | End: 2024-03-14
Admitting: FAMILY MEDICINE
Payer: MEDICAID

## 2024-03-14 DIAGNOSIS — R05.9 COUGH, UNSPECIFIED TYPE: ICD-10-CM

## 2024-03-14 DIAGNOSIS — R05.9 COUGH, UNSPECIFIED TYPE: Primary | ICD-10-CM

## 2024-03-14 PROCEDURE — 71046 X-RAY EXAM CHEST 2 VIEWS: CPT

## 2024-04-10 PROBLEM — N83.202 BILATERAL OVARIAN CYSTS: Status: RESOLVED | Noted: 2018-10-10 | Resolved: 2024-04-10

## 2024-04-10 PROBLEM — N83.201 BILATERAL OVARIAN CYSTS: Status: RESOLVED | Noted: 2018-10-10 | Resolved: 2024-04-10

## 2024-04-11 NOTE — PROGRESS NOTES
GYN Annual Exam     CC- Here for annual exam   Chief Complaint   Patient presents with    Gynecologic Exam          Alexandra Cantrell is a 44 y.o. No obstetric history on file. female who presents for annual well woman exam. No LMP recorded (lmp unknown). Patient has had a hysterectomy.    Problems in addition to need for annual:   Dyspareunia  Pelvic pain  These have been a long term problem.    HPI: History of Present Illness    PMHX:    * No active hospital problems. *  ; otherwise none    OB History    No obstetric history on file.           Past Medical History:   Diagnosis Date    Abdominal pain     SCHEDULED FOR SX    Abscess     LEFT UPPER JAW, TOOTH BROKE OFF    Anemia     Arthritis     FINGERS & TOES    Asthma     COPD (chronic obstructive pulmonary disease)     Degenerative disc disease, cervical     Migraines        Past Surgical History:   Procedure Laterality Date    APPENDECTOMY       SECTION      X3    CHOLECYSTECTOMY      LAP    DIAGNOSTIC LAPAROSCOPY N/A 10/25/2018    Procedure: DIAGNOSTIC LAPAROSCOPY with lysis of adhesions;  Surgeon: Joe Hastings MD;  Location: [facility];  Service: Obstetrics/Gynecology    DIAGNOSTIC LAPAROSCOPY N/A 2022    Procedure: DIAGNOSTIC LAPAROTOMY, LAPAROSCOPIC BILATERAL SALPINGOOPHORECTOMY, EXTENSIVE LYSIS OF ADHESIONS;  Surgeon: Joe Hastings MD;  Location: [facility];  Service: Obstetrics/Gynecology;  Laterality: N/A;    HYSTERECTOMY      OPEN    TUBAL ABDOMINAL LIGATION           Current Outpatient Medications:     levothyroxine (SYNTHROID, LEVOTHROID) 50 MCG tablet, Daily., Disp: , Rfl:     metFORMIN (GLUCOPHAGE) 500 MG tablet, Daily., Disp: , Rfl:     ondansetron ODT (ZOFRAN-ODT) 4 MG disintegrating tablet, DISSOLVE ONE TABLET ON TONGUE EVERY 8 HOURS FOR 3 DAYS, Disp: , Rfl:     predniSONE (DELTASONE) 10 MG tablet, TAKE 6 TABLETS BY MOUTH EVERY DAY FOR 2 DAYS THEN 4 TABLETS EVERY DAY FOR 2 DAYS THEN 2 TABLETS EVERY DAY FOR  2 DAYS THEN 1/2 TABLET EVERY DAY FOR 2 DAYS WITH FOOD, Disp: , Rfl:     topiramate (TOPAMAX) 50 MG tablet, Take 1 tablet by mouth Daily., Disp: , Rfl:     valACYclovir (VALTREX) 1000 MG tablet, Every 8 (Eight) Hours., Disp: , Rfl:     albuterol (PROAIR RESPICLICK) 108 (90 Base) MCG/ACT inhaler, Inhale 2 puffs Every 4 (Four) Hours As Needed for Wheezing (asthma)., Disp: 1 inhaler, Rfl: 11    albuterol sulfate HFA (ProAir HFA) 108 (90 Base) MCG/ACT inhaler, INHALE 1 PUFF BY MOUTH EVERY 4 HOURS AS NEEDED, Disp: , Rfl:     albuterol sulfate HFA (Ventolin HFA) 108 (90 Base) MCG/ACT inhaler, Every 4 (Four) Hours., Disp: , Rfl:     atorvastatin (LIPITOR) 10 MG tablet, Take 1 tablet by mouth Daily., Disp: , Rfl:     budesonide-formoterol (Symbicort) 160-4.5 MCG/ACT inhaler, Every 12 (Twelve) Hours., Disp: , Rfl:     butalbital-acetaminophen-caffeine (FIORICET, ESGIC) -40 MG per tablet, , Disp: , Rfl:     Cetirizine HCl (zyrTEC) 5 MG/5ML solution solution, Daily., Disp: , Rfl:     escitalopram (LEXAPRO) 10 MG tablet, Take 1 tablet by mouth Daily., Disp: , Rfl:     estradiol (ESTRACE) 2 MG tablet, TAKE 1 TABLET BY MOUTH EVERY DAY for 90, Disp: , Rfl:     folic acid (FOLVITE) 1 MG tablet, Take 1 tablet by mouth Daily., Disp: , Rfl:     gabapentin (NEURONTIN) 100 MG capsule, Daily., Disp: , Rfl:     loratadine (CLARITIN) 10 MG tablet, Take 1 tablet by mouth Daily., Disp: , Rfl:     meloxicam (MOBIC) 7.5 MG tablet, 1 tablet Orally bid with food for 30 day(s), Disp: , Rfl:     pregabalin (LYRICA) 75 MG capsule, Take 1 capsule by mouth 2 (Two) Times a Day., Disp: , Rfl:     tiZANidine (ZANAFLEX) 4 MG tablet, TAKE ONE TABLET BY MOUTH TWICE DAILY AS NEEDED 30 DAYS., Disp: , Rfl:     traZODone (DESYREL) 50 MG tablet, Daily., Disp: , Rfl:     vitamin D (ERGOCALCIFEROL) 1.25 MG (79879 UT) capsule capsule, Take 1 capsule by mouth 1 (One) Time Per Week., Disp: , Rfl:     Allergies   Allergen Reactions    Aspirin Anaphylaxis and  "Unknown - High Severity     Other reaction(s): anaphylaxis    Sulfamethoxazole-Trimethoprim Anaphylaxis, Hives, Unknown - High Severity and Rash     Other reaction(s): hives    Gabapentin Other (See Comments)     Behavioral changes, aggressive behavior UNABLE TO EAT WHILE TAKING THE MEDICINE    Other reaction(s): Other (See Comments)   Behavioral changes, aggressive behavior UNABLE TO EAT WHILE TAKING THE MEDICINE       Social History     Tobacco Use    Smoking status: Every Day     Current packs/day: 1.50     Average packs/day: 1.5 packs/day for 25.0 years (37.5 ttl pk-yrs)     Types: Cigarettes    Smokeless tobacco: Never   Vaping Use    Vaping status: Never Used   Substance Use Topics    Alcohol use: No    Drug use: No       Alexandra Cantrell  reports that she has been smoking cigarettes. She has a 37.5 pack-year smoking history. She has never used smokeless tobacco..       Family History   Problem Relation Age of Onset    Heart attack Mother     Diabetes Mother     Heart disease Father     Stroke Father     Lung cancer Father     Heart attack Sister     Heart attack Sister     Heart attack Sister     Ovarian cancer Sister     Heart attack Brother     Heart attack Brother     Heart attack Brother     Diabetes Maternal Grandmother     Diabetes Paternal Grandmother     Breast cancer Neg Hx        Review of Systems    Patient reports that she is not currently experiencing any symptoms of urinary incontinence.      noTESTED FOR CHLAMYDIA?    Gardisil indicated? (9-44yo) 0,2,6 months: no    EXAM:  Ht 167.6 cm (66\")   Wt 94.8 kg (209 lb)   LMP  (LMP Unknown)   Breastfeeding No   BMI 33.73 kg/m²     Labs:   Lab Results (last 24 hours)       Procedure Component Value Units Date/Time    POC Urinalysis Dipstick [[id number]] Collected: 04/12/24 1101    Specimen: Urine Updated: 04/12/24 1101     Color Yellow     Clarity, UA Clear     Glucose, UA Negative mg/dL      Bilirubin Negative     Ketones, UA Negative     " Specific Gravity  1.005     Blood, UA Negative     pH, Urine 5.0     Protein, POC Negative mg/dL      Urobilinogen, UA Normal     Leukocytes Negative     Nitrite, UA Negative            Physical Exam  Vitals and nursing note reviewed. Exam conducted with a chaperone present.   Constitutional:       General: She is not in acute distress.     Appearance: She is well-developed. She is not diaphoretic.      Comments: Pt is adentulous   HENT:      Head: Normocephalic and atraumatic.      Nose: Nose normal.   Eyes:      Extraocular Movements: Extraocular movements intact.   Cardiovascular:      Rate and Rhythm: Normal rate.   Pulmonary:      Effort: Pulmonary effort is normal.   Chest:   Breasts:     Breasts are symmetrical.      Right: Normal. No mass, nipple discharge, skin change or tenderness.      Left: Normal. No mass, nipple discharge, skin change or tenderness.   Abdominal:      General: There is no distension.      Palpations: Abdomen is soft. There is no mass.      Tenderness: There is no abdominal tenderness. There is no guarding.   Genitourinary:     General: Normal vulva.      Pubic Area: No rash.       Vagina: Normal. No vaginal discharge.      Uterus: Absent.       Adnexa: Right adnexa normal and left adnexa normal.            Comments: Pap done.  No pain on palpation  Musculoskeletal:         General: No tenderness or deformity. Normal range of motion.      Cervical back: Normal range of motion.   Lymphadenopathy:      Upper Body:      Right upper body: No axillary adenopathy.      Left upper body: No axillary adenopathy.   Skin:     General: Skin is warm and dry.      Coloration: Skin is not pale.      Findings: No erythema or rash.   Neurological:      Mental Status: She is alert and oriented to person, place, and time.   Psychiatric:         Behavior: Behavior normal.         Thought Content: Thought content normal.         Judgment: Judgment normal.            As part of wellness and prevention, the  following topics were discussed with the patient where appropriate: healthy weight, substance abuse/misuse, mental health, encouraging self breast exam, and other counseling and guidance done:  Nutrition, physical activity, healthy weight, injury prevention, misuse of tobacco, alcohol and drugs, sexual behavior and STDs, contraception, dental health, mental health, immunizations breast cancer screening and exams.    Assessment     1) GYN annual well woman exam.   2) PAP done today? Yes  3) problems addressed: yes    MAMMOGRAM UP TO DATE IF AGE APPROPRIATE?  Yes  (if no, pt encouraged to schedule; risks of undiagnosed breast cancer reviewed with pt if she does not have a mammogram)    COLONOSCOPY UP TO DATE IF AGE APPROPRIATE? No  (if no, risks of undiagnosed colon cancer reviewed with pt if she fails to have the colonoscopy)             Plan       Follow up prn or one year.  CT scan to evaluate her pelvic pain and dyspareunia.      Pt instructed to call for results of any testing done today and that failure to call if she has not heard from us could result in inadequate treatment.  Pt verbalized her understanding.     Diagnoses and all orders for this visit:    1. Smear, vaginal, as part of routine gynecological examination (Primary)  -     POC Urinalysis Dipstick  -     IGP, Apt HPV,rfx 16 / 18,45    2. Smoker    3. Mild intermittent asthma without complication    4. Status post bilateral salpingo-oophorectomy    5. H/O abdominal hysterectomy-for chronic DUB    6. Pelvic adhesive disease    7. LGSIL Pap smear of vagina    8. Routine gynecological examination  -     POC Urinalysis Dipstick  -     IGP, Apt HPV,rfx 16 / 18,45    9. Special screening examination for human papillomavirus (HPV)  -     POC Urinalysis Dipstick  -     IGP, Apt HPV,rfx 16 / 18,45    10. Pelvic pain  -     CT Abdomen Pelvis With Contrast; Future    11. Female dyspareunia  -     CT Abdomen Pelvis With Contrast; Future    12. Screening for  colon cancer  -     Ambulatory Referral For Screening Colonoscopy    13. Screening mammogram for breast cancer  -     Mammo Screening Digital Tomosynthesis Bilateral With CAD; Future        RTO Return in about 1 year (around 4/12/2025) for Annual physical.    I spent 20+ minutes on the separately reported service of pelvic pain and dyspareunia. This time is not included in the time used to support the E/M service also reported today.      Joe Hastings MD  04/12/24  11:53 EDT

## 2024-04-12 ENCOUNTER — OFFICE VISIT (OUTPATIENT)
Dept: OBSTETRICS AND GYNECOLOGY | Facility: CLINIC | Age: 44
End: 2024-04-12
Payer: MEDICAID

## 2024-04-12 VITALS — HEIGHT: 66 IN | BODY MASS INDEX: 33.59 KG/M2 | WEIGHT: 209 LBS

## 2024-04-12 DIAGNOSIS — Z01.419 SMEAR, VAGINAL, AS PART OF ROUTINE GYNECOLOGICAL EXAMINATION: Primary | ICD-10-CM

## 2024-04-12 DIAGNOSIS — N73.6 PELVIC ADHESIVE DISEASE: ICD-10-CM

## 2024-04-12 DIAGNOSIS — Z01.419 ROUTINE GYNECOLOGICAL EXAMINATION: ICD-10-CM

## 2024-04-12 DIAGNOSIS — R87.622 LGSIL PAP SMEAR OF VAGINA: ICD-10-CM

## 2024-04-12 DIAGNOSIS — Z12.72 SMEAR, VAGINAL, AS PART OF ROUTINE GYNECOLOGICAL EXAMINATION: Primary | ICD-10-CM

## 2024-04-12 DIAGNOSIS — Z90.710 H/O ABDOMINAL HYSTERECTOMY: ICD-10-CM

## 2024-04-12 DIAGNOSIS — R10.2 PELVIC PAIN: ICD-10-CM

## 2024-04-12 DIAGNOSIS — N94.10 FEMALE DYSPAREUNIA: ICD-10-CM

## 2024-04-12 DIAGNOSIS — F17.200 SMOKER: ICD-10-CM

## 2024-04-12 DIAGNOSIS — Z11.51 SPECIAL SCREENING EXAMINATION FOR HUMAN PAPILLOMAVIRUS (HPV): ICD-10-CM

## 2024-04-12 DIAGNOSIS — Z12.31 SCREENING MAMMOGRAM FOR BREAST CANCER: ICD-10-CM

## 2024-04-12 DIAGNOSIS — Z12.11 SCREENING FOR COLON CANCER: ICD-10-CM

## 2024-04-12 DIAGNOSIS — Z90.722 STATUS POST BILATERAL SALPINGO-OOPHORECTOMY: ICD-10-CM

## 2024-04-12 DIAGNOSIS — J45.20 MILD INTERMITTENT ASTHMA WITHOUT COMPLICATION: ICD-10-CM

## 2024-04-12 RX ORDER — ONDANSETRON 4 MG/1
TABLET, ORALLY DISINTEGRATING ORAL
COMMUNITY
Start: 2024-03-27

## 2024-04-12 RX ORDER — BUDESONIDE AND FORMOTEROL FUMARATE DIHYDRATE 160; 4.5 UG/1; UG/1
AEROSOL RESPIRATORY (INHALATION) EVERY 12 HOURS SCHEDULED
COMMUNITY

## 2024-04-12 RX ORDER — PREGABALIN 75 MG/1
1 CAPSULE ORAL 2 TIMES DAILY
COMMUNITY

## 2024-04-12 RX ORDER — GABAPENTIN 100 MG/1
CAPSULE ORAL EVERY 24 HOURS
COMMUNITY

## 2024-04-12 RX ORDER — TIZANIDINE 4 MG/1
TABLET ORAL
COMMUNITY

## 2024-04-12 RX ORDER — PREDNISONE 10 MG/1
TABLET ORAL
COMMUNITY
Start: 2024-01-11

## 2024-04-12 RX ORDER — ALBUTEROL SULFATE 90 UG/1
AEROSOL, METERED RESPIRATORY (INHALATION)
COMMUNITY

## 2024-04-12 RX ORDER — BUTALBITAL, ACETAMINOPHEN AND CAFFEINE 50; 325; 40 MG/1; MG/1; MG/1
TABLET ORAL
COMMUNITY

## 2024-04-12 RX ORDER — TOPIRAMATE 50 MG/1
1 TABLET, FILM COATED ORAL DAILY
COMMUNITY
Start: 2024-03-14 | End: 2024-04-13

## 2024-04-12 RX ORDER — VALACYCLOVIR HYDROCHLORIDE 1 G/1
TABLET, FILM COATED ORAL EVERY 8 HOURS SCHEDULED
COMMUNITY
Start: 2023-12-14

## 2024-04-12 RX ORDER — TRAZODONE HYDROCHLORIDE 50 MG/1
TABLET ORAL EVERY 24 HOURS
COMMUNITY

## 2024-04-12 RX ORDER — LEVOTHYROXINE SODIUM 0.05 MG/1
TABLET ORAL EVERY 24 HOURS
COMMUNITY
Start: 2023-10-16

## 2024-04-12 RX ORDER — LORATADINE 10 MG/1
1 TABLET ORAL DAILY
COMMUNITY

## 2024-04-12 RX ORDER — MELOXICAM 7.5 MG/1
TABLET ORAL
COMMUNITY

## 2024-04-12 RX ORDER — ATORVASTATIN CALCIUM 10 MG/1
10 TABLET, FILM COATED ORAL DAILY
COMMUNITY

## 2024-04-12 RX ORDER — ESCITALOPRAM OXALATE 10 MG/1
1 TABLET ORAL DAILY
COMMUNITY

## 2024-04-12 RX ORDER — ESTRADIOL 2 MG/1
TABLET ORAL
COMMUNITY

## 2024-04-17 LAB
CYTOLOGIST CVX/VAG CYTO: ABNORMAL
CYTOLOGY CVX/VAG DOC CYTO: ABNORMAL
CYTOLOGY CVX/VAG DOC THIN PREP: ABNORMAL
DX ICD CODE: ABNORMAL
DX ICD CODE: ABNORMAL
HPV I/H RISK 4 DNA CVX QL PROBE+SIG AMP: POSITIVE
HPV16 DNA CVX QL PROBE+SIG AMP: NEGATIVE
HPV18+45 E6+E7 MRNA CVX QL NAA+PROBE: NEGATIVE
Lab: ABNORMAL
OTHER STN SPEC: ABNORMAL
PATHOLOGIST CVX/VAG CYTO: ABNORMAL
RECOM F/U CVX/VAG CYTO: ABNORMAL
STAT OF ADQ CVX/VAG CYTO-IMP: ABNORMAL

## 2024-04-18 ENCOUNTER — HOSPITAL ENCOUNTER (EMERGENCY)
Facility: HOSPITAL | Age: 44
Discharge: HOME | End: 2024-04-18
Attending: EMERGENCY MEDICINE
Payer: MEDICAID

## 2024-04-18 ENCOUNTER — APPOINTMENT (OUTPATIENT)
Dept: GENERAL RADIOLOGY | Facility: HOSPITAL | Age: 44
End: 2024-04-18
Payer: MEDICAID

## 2024-04-18 VITALS
WEIGHT: 209 LBS | TEMPERATURE: 97.6 F | SYSTOLIC BLOOD PRESSURE: 127 MMHG | BODY MASS INDEX: 33.59 KG/M2 | DIASTOLIC BLOOD PRESSURE: 60 MMHG | HEART RATE: 76 BPM | HEIGHT: 66 IN | OXYGEN SATURATION: 98 % | RESPIRATION RATE: 20 BRPM

## 2024-04-18 DIAGNOSIS — M79.671 RIGHT FOOT PAIN: Primary | ICD-10-CM

## 2024-04-18 PROCEDURE — 73630 X-RAY EXAM OF FOOT: CPT

## 2024-04-18 PROCEDURE — 99283 EMERGENCY DEPT VISIT LOW MDM: CPT

## 2024-04-18 NOTE — ED PROVIDER NOTES
Time: 7:02 PM EDT  Date of encounter:  2024  Independent Historian/Clinical History and Information was obtained by:   Patient    History is limited by: N/A    Chief Complaint: Extremity pain      History of Present Illness:  Patient is a 44 y.o. year old female who presents to the emergency department for evaluation of pain in the right foot after stepping off of a ledge to hard last p.m.  Patient reports she has had previous injury to the right foot is concern for new fracture.  Denies any ankle pain.  Reports pain is worse when she puts pressure on the foot to walk.  Reports current daily use of NSAIDs with minimal relief of pain.  Denies any other injuries.  Denies falling or hitting her head.    HPI    Patient Care Team  Primary Care Provider: Adiel Murray MD    Past Medical History:     Allergies   Allergen Reactions    Aspirin Anaphylaxis and Unknown - High Severity     Other reaction(s): anaphylaxis    Sulfamethoxazole-Trimethoprim Anaphylaxis, Hives, Unknown - High Severity and Rash     Other reaction(s): hives    Gabapentin Other (See Comments)     Behavioral changes, aggressive behavior UNABLE TO EAT WHILE TAKING THE MEDICINE    Other reaction(s): Other (See Comments)   Behavioral changes, aggressive behavior UNABLE TO EAT WHILE TAKING THE MEDICINE     Past Medical History:   Diagnosis Date    Abdominal pain     SCHEDULED FOR SX    Abscess     LEFT UPPER JAW, TOOTH BROKE OFF    Anemia     Arthritis     FINGERS & TOES    Asthma     COPD (chronic obstructive pulmonary disease)     Degenerative disc disease, cervical     Migraines      Past Surgical History:   Procedure Laterality Date    APPENDECTOMY       SECTION      X3    CHOLECYSTECTOMY      LAP    DIAGNOSTIC LAPAROSCOPY N/A 10/25/2018    Procedure: DIAGNOSTIC LAPAROSCOPY with lysis of adhesions;  Surgeon: Joe Hastings MD;  Location: [facility], [place];  Service: Obstetrics/Gynecology    DIAGNOSTIC LAPAROSCOPY N/A 2022     Procedure: DIAGNOSTIC LAPAROTOMY, LAPAROSCOPIC BILATERAL SALPINGOOPHORECTOMY, EXTENSIVE LYSIS OF ADHESIONS;  Surgeon: Joe Hastings MD;  Location: [facility];  Service: Obstetrics/Gynecology;  Laterality: N/A;    HYSTERECTOMY      OPEN    TUBAL ABDOMINAL LIGATION       Family History   Problem Relation Age of Onset    Heart attack Mother     Diabetes Mother     Heart disease Father     Stroke Father     Lung cancer Father     Heart attack Sister     Heart attack Sister     Heart attack Sister     Ovarian cancer Sister     Heart attack Brother     Heart attack Brother     Heart attack Brother     Diabetes Maternal Grandmother     Diabetes Paternal Grandmother     Breast cancer Neg Hx        Home Medications:  Prior to Admission medications    Medication Sig Start Date End Date Taking? Authorizing Provider   albuterol (PROAIR RESPICLICK) 108 (90 Base) MCG/ACT inhaler Inhale 2 puffs Every 4 (Four) Hours As Needed for Wheezing (asthma). 7/31/19   Quin Paulino APRN   albuterol sulfate HFA (ProAir HFA) 108 (90 Base) MCG/ACT inhaler INHALE 1 PUFF BY MOUTH EVERY 4 HOURS AS NEEDED    Darell Mart MD   albuterol sulfate HFA (Ventolin HFA) 108 (90 Base) MCG/ACT inhaler Every 4 (Four) Hours.    Darell Mart MD   atorvastatin (LIPITOR) 10 MG tablet Take 1 tablet by mouth Daily.    Darell Mart MD   budesonide-formoterol (Symbicort) 160-4.5 MCG/ACT inhaler Every 12 (Twelve) Hours.    Darell Mart MD   butalbital-acetaminophen-caffeine (FIORICET, ESGIC) -40 MG per tablet     Darell Mart MD   Cetirizine HCl (zyrTEC) 5 MG/5ML solution solution Daily. 10/24/22   Darell Mart MD   escitalopram (LEXAPRO) 10 MG tablet Take 1 tablet by mouth Daily.    Darell Mart MD   estradiol (ESTRACE) 2 MG tablet TAKE 1 TABLET BY MOUTH EVERY DAY for 90    Darell Mart MD   folic acid (FOLVITE) 1 MG tablet Take 1 tablet by mouth Daily. 4/18/23   Barron  MD Darell   gabapentin (NEURONTIN) 100 MG capsule Daily.    Darell Mart MD   levothyroxine (SYNTHROID, LEVOTHROID) 50 MCG tablet Daily. 10/16/23   Darell Mart MD   loratadine (CLARITIN) 10 MG tablet Take 1 tablet by mouth Daily.    Darell Mart MD   meloxicam (MOBIC) 7.5 MG tablet 1 tablet Orally bid with food for 30 day(s)    Darell Mart MD   metFORMIN (GLUCOPHAGE) 500 MG tablet Daily. 3/14/24   Darell Mart MD   ondansetron ODT (ZOFRAN-ODT) 4 MG disintegrating tablet DISSOLVE ONE TABLET ON TONGUE EVERY 8 HOURS FOR 3 DAYS 3/27/24   Darell Mart MD   predniSONE (DELTASONE) 10 MG tablet TAKE 6 TABLETS BY MOUTH EVERY DAY FOR 2 DAYS THEN 4 TABLETS EVERY DAY FOR 2 DAYS THEN 2 TABLETS EVERY DAY FOR 2 DAYS THEN 1/2 TABLET EVERY DAY FOR 2 DAYS WITH FOOD 1/11/24   Darell Mart MD   pregabalin (LYRICA) 75 MG capsule Take 1 capsule by mouth 2 (Two) Times a Day.    Darell Mart MD   tiZANidine (ZANAFLEX) 4 MG tablet TAKE ONE TABLET BY MOUTH TWICE DAILY AS NEEDED 30 DAYS.    Darell Mart MD   topiramate (TOPAMAX) 50 MG tablet Take 1 tablet by mouth Daily. 3/14/24 4/13/24  Darell Mart MD   traZODone (DESYREL) 50 MG tablet Daily.    Darell Mart MD   valACYclovir (VALTREX) 1000 MG tablet Every 8 (Eight) Hours. 12/14/23   Darell Mart MD   vitamin D (ERGOCALCIFEROL) 1.25 MG (23002 UT) capsule capsule Take 1 capsule by mouth 1 (One) Time Per Week. 4/18/23   Darell Mart MD        Social History:   Social History     Tobacco Use    Smoking status: Every Day     Current packs/day: 1.50     Average packs/day: 1.5 packs/day for 25.0 years (37.5 ttl pk-yrs)     Types: Cigarettes    Smokeless tobacco: Never   Vaping Use    Vaping status: Never Used   Substance Use Topics    Alcohol use: No    Drug use: No         Review of Systems:  Review of Systems   Constitutional:  Negative for chills, fatigue and fever.  "  HENT:  Negative for ear pain, rhinorrhea and sore throat.    Eyes:  Negative for visual disturbance.   Respiratory:  Negative for cough and shortness of breath.    Cardiovascular:  Negative for chest pain.   Gastrointestinal:  Negative for abdominal pain, diarrhea and vomiting.   Genitourinary:  Negative for difficulty urinating.   Musculoskeletal:  Positive for arthralgias. Negative for back pain and myalgias.   Skin:  Negative for rash.   Neurological:  Negative for light-headedness and headaches.   Hematological:  Negative for adenopathy.   Psychiatric/Behavioral: Negative.          Physical Exam:  /64 (BP Location: Right arm, Patient Position: Sitting)   Pulse 71   Temp 97.5 °F (36.4 °C) (Oral)   Resp 20   Ht 167.6 cm (66\")   Wt 94.8 kg (208 lb 15.9 oz)   LMP  (LMP Unknown)   SpO2 97%   BMI 33.73 kg/m²     Physical Exam  Vitals and nursing note reviewed.   Constitutional:       General: She is not in acute distress.     Appearance: Normal appearance. She is not toxic-appearing.   HENT:      Head: Normocephalic and atraumatic.      Nose: Nose normal.      Mouth/Throat:      Mouth: Mucous membranes are moist.   Eyes:      Conjunctiva/sclera: Conjunctivae normal.   Cardiovascular:      Rate and Rhythm: Normal rate.      Pulses: Normal pulses.   Pulmonary:      Effort: Pulmonary effort is normal.   Abdominal:      Palpations: Abdomen is soft.      Tenderness: There is no abdominal tenderness.   Musculoskeletal:         General: Normal range of motion.      Cervical back: Normal range of motion.   Skin:     General: Skin is warm and dry.   Neurological:      General: No focal deficit present.      Mental Status: She is alert and oriented to person, place, and time.   Psychiatric:         Mood and Affect: Mood normal.         Behavior: Behavior normal.         Thought Content: Thought content normal.         Judgment: Judgment normal.                  Procedures:  Procedures      Medical Decision " Making:      Comorbidities that affect care:    None    External Notes reviewed:    None      The following orders were placed and all results were independently analyzed by me:  Orders Placed This Encounter   Procedures    XR Foot 3+ View Right    Apply ace wrap       Medications Given in the Emergency Department:  Medications - No data to display     ED Course:         Labs:    Lab Results (last 24 hours)       ** No results found for the last 24 hours. **             Imaging:    XR Foot 3+ View Right    Result Date: 4/18/2024  XR FOOT 3+ VW RIGHT-  Date of Exam: 4/18/2024 4:00 PM  Indication: PT tripped in yard, having pain to bottom of right foot  Comparison: None available.  Findings: No fractures or dislocations. Bone mineral density is normal. Type I navicular. Small enthesophyte Achilles tendon insertion site.      Impression: No fractures or dislocations of the foot.   Electronically Signed By-Gretta Lal MD On:4/18/2024 4:14 PM         Differential Diagnosis and Discussion:    Extremity Pain: Differential diagnosis includes but is not limited to soft tissue sprain, tendonitis, tendon injury, dislocation, fracture, deep vein thrombosis, arterial insufficiency, osteoarthritis, bursitis, and ligamentous damage.    All X-rays impressions were independently interpreted by me.    MDM     Amount and/or Complexity of Data Reviewed  Tests in the radiology section of CPT®: reviewed               Patient Care Considerations:    NARCOTICS: I considered prescribing opiate pain medication as an outpatient, however patient's pain is tolerable in the ED without the use of narcotics.      Consultants/Shared Management Plan:    None    Social Determinants of Health:    Patient is independent, reliable, and has access to care.       Disposition and Care Coordination:    Discharged: The patient is suitable and stable for discharge with no need for consideration of admission.    I have explained the patient´s condition,  diagnoses and treatment plan based on the information available to me at this time. I have answered questions and addressed any concerns. The patient has a good  understanding of the patient´s diagnosis, condition, and treatment plan as can be expected at this point. The vital signs have been stable. The patient´s condition is stable and appropriate for discharge from the emergency department.      The patient will pursue further outpatient evaluation with the primary care physician or other designated or consulting physician as outlined in the discharge instructions. They are agreeable to this plan of care and follow-up instructions have been explained in detail. The patient has received these instructions in written format and has expressed an understanding of the discharge instructions. The patient is aware that any significant change in condition or worsening of symptoms should prompt an immediate return to this or the closest emergency department or call to 911.  I have explained discharge medications and the need for follow up with the patient/caretakers. This was also printed in the discharge instructions. Patient was discharged with the following medications and follow up:      Medication List      No changes were made to your prescriptions during this visit.      Adiel Murray MD  [address] [place] [id number] [phone]    Go to   As needed       Final diagnoses:   Right foot pain        ED Disposition       ED Disposition   Discharge    Condition   Stable    Comment   --               This medical record created using voice recognition software.             Maribel Barbour P, APRN  04/18/24 [id number]

## 2024-04-18 NOTE — DISCHARGE INSTRUCTIONS
Please follow with your primary care provider as needed.  Return to the ED if you have worsening pain, swelling, inability to walk on the affected extremity.   Vaccine status unknown

## 2024-04-18 NOTE — Clinical Note
[facility] EMERGENCY ROOM  [address] [place] [address]  Phone: [phone]  Fax: [phone]    Alexandra Cantrell was seen and treated in our emergency department on 4/18/2024.  She may return to work on 04/22/2024.         Thank you for choosing [facility].    Song Muro MD

## 2024-04-23 ENCOUNTER — OFFICE VISIT (OUTPATIENT)
Dept: OBSTETRICS AND GYNECOLOGY | Facility: CLINIC | Age: 44
End: 2024-04-23
Payer: MEDICAID

## 2024-04-23 VITALS
HEIGHT: 66 IN | BODY MASS INDEX: 33.27 KG/M2 | DIASTOLIC BLOOD PRESSURE: 80 MMHG | SYSTOLIC BLOOD PRESSURE: 124 MMHG | WEIGHT: 207 LBS

## 2024-04-23 DIAGNOSIS — Z13.89 SCREENING FOR GENITOURINARY CONDITION: Primary | ICD-10-CM

## 2024-04-23 LAB
BILIRUB BLD-MCNC: NEGATIVE MG/DL
CLARITY, POC: CLEAR
COLOR UR: YELLOW
GLUCOSE UR STRIP-MCNC: NEGATIVE MG/DL
KETONES UR QL: NEGATIVE
LEUKOCYTE EST, POC: NEGATIVE
NITRITE UR-MCNC: NEGATIVE MG/ML
PH UR: 5 [PH] (ref 5–8)
PROT UR STRIP-MCNC: NEGATIVE MG/DL
RBC # UR STRIP: NEGATIVE /UL
SP GR UR: 1.02 (ref 1–1.03)
UROBILINOGEN UR QL: NORMAL

## 2024-04-23 NOTE — PROGRESS NOTES
"Colposcopy Procedure Note    /80   Ht 167.6 cm (66\")   Wt 93.9 kg (207 lb)   LMP  (LMP Unknown)   BMI 33.41 kg/m²     Indications: Most recent Pap smear showed: ASCUS with POSITIVE high risk HPV.  Prior cervical/vaginal disease: LGSIL.  Prior cervical treatment: hysterectomy.    Procedure Details   The risks and benefits of the procedure and Verbal informed consent obtained.    Speculum placed in vagina and excellent visualization of cervix achieved, cervix swabbed x 3 with acetic acid solution and Lugol's solution     Findings:  Physical Exam  Cuff: no visible lesions, no mosaicism, no punctation, and no abnormal vasculature; cervix swabbed with Lugol's solution.  Vaginal inspection: normal without visible lesions.  Vulvar colposcopy: vulvar colposcopy not performed.      Specimens: none    Complications: none.    PT TOLERATED PROCEDURE WELL.     I saw the patient with a face mask, gloves and eye protection  The patient herself was masked.  Social distancing was observed as appropriate. All COVID precautions observed.     IMPRESSION:  ADEQUATE COLPOSCOPY OF CUFF, SMOKER, NO ABNORMALITIES    Plan:  rpt pap 1 yr      Joe Hastings MD  10:38 EDT  04/23/24    "

## 2024-05-02 ENCOUNTER — HOSPITAL ENCOUNTER (OUTPATIENT)
Dept: CT IMAGING | Facility: HOSPITAL | Age: 44
Discharge: HOME | End: 2024-05-02
Admitting: OBSTETRICS & GYNECOLOGY
Payer: MEDICAID

## 2024-05-02 DIAGNOSIS — R10.2 PELVIC PAIN: ICD-10-CM

## 2024-05-02 DIAGNOSIS — N94.10 FEMALE DYSPAREUNIA: ICD-10-CM

## 2024-05-02 PROCEDURE — 74177 CT ABD & PELVIS W/CONTRAST: CPT

## 2024-05-02 PROCEDURE — 25510000001 IOPAMIDOL PER 1 ML: Performed by: OBSTETRICS & GYNECOLOGY

## 2024-05-02 PROCEDURE — 0 DIATRIZOATE MEGLUMINE & SODIUM PER 1 ML: Performed by: OBSTETRICS & GYNECOLOGY

## 2024-05-02 RX ADMIN — IOPAMIDOL 100 ML: 755 INJECTION, SOLUTION INTRAVENOUS at 14:55

## 2024-05-02 RX ADMIN — DIATRIZOATE MEGLUMINE AND DIATRIZOATE SODIUM 30 ML: 660; 100 LIQUID ORAL; RECTAL at 13:47

## 2024-06-03 ENCOUNTER — HOSPITAL ENCOUNTER (OUTPATIENT)
Dept: MAMMOGRAPHY | Facility: HOSPITAL | Age: 44
Discharge: HOME | End: 2024-06-03
Admitting: OBSTETRICS & GYNECOLOGY
Payer: MEDICAID

## 2024-06-03 DIAGNOSIS — Z12.31 SCREENING MAMMOGRAM FOR BREAST CANCER: ICD-10-CM

## 2024-06-03 PROCEDURE — 77067 SCR MAMMO BI INCL CAD: CPT

## 2024-06-03 PROCEDURE — 77063 BREAST TOMOSYNTHESIS BI: CPT

## 2024-06-03 PROCEDURE — 77067 SCR MAMMO BI INCL CAD: CPT | Performed by: RADIOLOGY

## 2024-06-03 PROCEDURE — 77063 BREAST TOMOSYNTHESIS BI: CPT | Performed by: RADIOLOGY

## 2025-04-15 ENCOUNTER — OFFICE VISIT (OUTPATIENT)
Dept: OBSTETRICS AND GYNECOLOGY | Facility: CLINIC | Age: 45
End: 2025-04-15
Payer: MEDICAID

## 2025-04-15 VITALS
HEIGHT: 66 IN | DIASTOLIC BLOOD PRESSURE: 88 MMHG | SYSTOLIC BLOOD PRESSURE: 146 MMHG | BODY MASS INDEX: 33.43 KG/M2 | WEIGHT: 208 LBS

## 2025-04-15 DIAGNOSIS — K66.0 INTRA-ABDOMINAL ADHESIONS: ICD-10-CM

## 2025-04-15 DIAGNOSIS — Z01.419 ROUTINE GYNECOLOGICAL EXAMINATION: Primary | ICD-10-CM

## 2025-04-15 DIAGNOSIS — Z12.11 SCREENING FOR COLON CANCER: ICD-10-CM

## 2025-04-15 DIAGNOSIS — R10.30 LOWER ABDOMINAL PAIN: ICD-10-CM

## 2025-04-15 DIAGNOSIS — Z12.31 SCREENING MAMMOGRAM FOR BREAST CANCER: ICD-10-CM

## 2025-04-15 DIAGNOSIS — F17.200 SMOKER: ICD-10-CM

## 2025-04-15 DIAGNOSIS — Z90.722 STATUS POST BILATERAL SALPINGO-OOPHORECTOMY: ICD-10-CM

## 2025-04-15 DIAGNOSIS — Z90.710 H/O ABDOMINAL HYSTERECTOMY: ICD-10-CM

## 2025-04-15 DIAGNOSIS — Z12.72 SMEAR, VAGINAL, AS PART OF ROUTINE GYNECOLOGICAL EXAMINATION: ICD-10-CM

## 2025-04-15 DIAGNOSIS — Z11.51 SPECIAL SCREENING EXAMINATION FOR HUMAN PAPILLOMAVIRUS (HPV): ICD-10-CM

## 2025-04-15 DIAGNOSIS — Z01.419 SMEAR, VAGINAL, AS PART OF ROUTINE GYNECOLOGICAL EXAMINATION: ICD-10-CM

## 2025-04-15 PROBLEM — R87.622 LGSIL PAP SMEAR OF VAGINA: Status: RESOLVED | Noted: 2023-04-20 | Resolved: 2025-04-15

## 2025-04-15 RX ORDER — ALBUTEROL SULFATE 90 UG/1
INHALANT RESPIRATORY (INHALATION)
COMMUNITY

## 2025-04-15 RX ORDER — CEPHALEXIN 500 MG/1
1 CAPSULE ORAL EVERY 8 HOURS SCHEDULED
COMMUNITY
Start: 2025-01-30

## 2025-04-15 RX ORDER — BETAMETHASONE DIPROPIONATE 0.5 MG/G
1 CREAM TOPICAL EVERY 24 HOURS
COMMUNITY

## 2025-04-15 RX ORDER — IBUPROFEN 800 MG/1
TABLET, FILM COATED ORAL
COMMUNITY
Start: 2025-02-14

## 2025-04-15 RX ORDER — BLOOD SUGAR DIAGNOSTIC
STRIP MISCELLANEOUS
COMMUNITY
Start: 2025-01-21

## 2025-04-15 RX ORDER — OSELTAMIVIR PHOSPHATE 75 MG/1
1 CAPSULE ORAL EVERY 12 HOURS SCHEDULED
COMMUNITY
Start: 2025-02-03

## 2025-04-15 RX ORDER — AZITHROMYCIN 250 MG/1
TABLET, FILM COATED ORAL
COMMUNITY

## 2025-04-15 RX ORDER — FLUTICASONE PROPIONATE 50 MCG
SPRAY, SUSPENSION (ML) NASAL EVERY 12 HOURS SCHEDULED
COMMUNITY
Start: 2025-03-18

## 2025-04-15 NOTE — PROGRESS NOTES
GYN Annual Exam     CC- Here for annual exam   Chief Complaint   Patient presents with    Annual Exam          Alexandra Cantrell is a 45 y.o. No obstetric history on file. female who presents for annual well woman exam. No LMP recorded (lmp unknown). Patient has had a hysterectomy.      Problems in addition to need for annual: pt desires referral to General Surgeon for treatment of pelvic pain possibly from adhesive disease from prior surgeries.     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         Past Medical History:   Diagnosis Date    Abdominal pain     SCHEDULED FOR SX    Abscess     LEFT UPPER JAW, TOOTH BROKE OFF    Anemia     Arthritis     FINGERS & TOES    Asthma     COPD (chronic obstructive pulmonary disease)     Degenerative disc disease, cervical     Migraines        Past Surgical History:   Procedure Laterality Date    APPENDECTOMY       SECTION      X3    CHOLECYSTECTOMY      LAP    DIAGNOSTIC LAPAROSCOPY N/A 10/25/2018    Procedure: DIAGNOSTIC LAPAROSCOPY with lysis of adhesions;  Surgeon: Joe Hastings MD;  Location: [facility] OR;  Service: Obstetrics/Gynecology    DIAGNOSTIC LAPAROSCOPY N/A 2022    Procedure: DIAGNOSTIC LAPAROTOMY, LAPAROSCOPIC BILATERAL SALPINGOOPHORECTOMY, EXTENSIVE LYSIS OF ADHESIONS;  Surgeon: Joe Hastings MD;  Location: [facility] OR;  Service: Obstetrics/Gynecology;  Laterality: N/A;    HYSTERECTOMY      OPEN    TUBAL ABDOMINAL LIGATION           Current Outpatient  Medications:     albuterol (PROAIR RESPICLICK) 108 (90 Base) MCG/ACT inhaler, Inhale 2 puffs Every 4 (Four) Hours As Needed for Wheezing (asthma)., Disp: 1 inhaler, Rfl: 11    albuterol sulfate HFA (ProAir HFA) 108 (90 Base) MCG/ACT inhaler, INHALE 1 PUFF BY MOUTH EVERY 4 HOURS AS NEEDED, Disp: , Rfl:     albuterol sulfate HFA (Ventolin HFA) 108 (90 Base) MCG/ACT inhaler, Every 4 (Four) Hours., Disp: , Rfl:     albuterol sulfate HFA (Ventolin HFA) 108 (90 Base) MCG/ACT inhaler, Every 4 (Four) Hours., Disp: , Rfl:     atorvastatin (LIPITOR) 10 MG tablet, Take 1 tablet by mouth Daily., Disp: , Rfl:     betamethasone dipropionate (DIPROSONE) 0.05 % cream, 1 Application Daily., Disp: , Rfl:     budesonide-formoterol (Symbicort) 160-4.5 MCG/ACT inhaler, Every 12 (Twelve) Hours., Disp: , Rfl:     Cetirizine HCl (zyrTEC) 5 MG/5ML solution solution, Daily., Disp: , Rfl:     estradiol (ESTRACE) 2 MG tablet, TAKE 1 TABLET BY MOUTH EVERY DAY for 90, Disp: , Rfl:     ibuprofen (ADVIL,MOTRIN) 800 MG tablet, take 1 tablet by mouth three times daily with food or milk as needed, Disp: , Rfl:     levothyroxine (SYNTHROID, LEVOTHROID) 50 MCG tablet, Daily., Disp: , Rfl:     loratadine (CLARITIN) 10 MG tablet, Take 1 tablet by mouth Daily., Disp: , Rfl:     tiZANidine (ZANAFLEX) 4 MG tablet, TAKE ONE TABLET BY MOUTH TWICE DAILY AS NEEDED 30 DAYS., Disp: , Rfl:     tiZANidine (ZANAFLEX) 4 MG tablet, 1 tablet as needed TWICE DAILYASNEEDED for 30 days, Disp: , Rfl:     azithromycin (ZITHROMAX) 250 MG tablet, TAKE 2 TABLETS BY MOUTH ON DAY ONE THEN 1 TABLT DAILY DAYS TWO THROUGH FIVE (Patient not taking: Reported on 4/15/2025), Disp: , Rfl:     butalbital-acetaminophen-caffeine (FIORICET, ESGIC) -40 MG per tablet, , Disp: , Rfl:     cephalexin (KEFLEX) 500 MG capsule, 1 capsule Every 8 (Eight) Hours. (Patient not taking: Reported on 4/15/2025), Disp: , Rfl:     escitalopram (LEXAPRO) 10 MG tablet, Take 1 tablet by mouth Daily.  (Patient not taking: Reported on 4/15/2025), Disp: , Rfl:     fluticasone (FLONASE) 50 MCG/ACT nasal spray, Every 12 (Twelve) Hours. (Patient not taking: Reported on 4/15/2025), Disp: , Rfl:     folic acid (FOLVITE) 1 MG tablet, Take 1 tablet by mouth Daily. (Patient not taking: Reported on 4/15/2025), Disp: , Rfl:     gabapentin (NEURONTIN) 100 MG capsule, Daily. (Patient not taking: Reported on 4/15/2025), Disp: , Rfl:     [facility] COVID-19 Rapid Test kit, use for at home covid testing AS DIRECTED on package (Patient not taking: Reported on 4/15/2025), Disp: , Rfl:     meloxicam (MOBIC) 7.5 MG tablet, 1 tablet Orally bid with food for 30 day(s) (Patient not taking: Reported on 4/15/2025), Disp: , Rfl:     metFORMIN (GLUCOPHAGE) 500 MG tablet, Daily. (Patient not taking: Reported on 4/15/2025), Disp: , Rfl:     ondansetron ODT (ZOFRAN-ODT) 4 MG disintegrating tablet, DISSOLVE ONE TABLET ON TONGUE EVERY 8 HOURS FOR 3 DAYS (Patient not taking: Reported on 4/15/2025), Disp: , Rfl:     oseltamivir (TAMIFLU) 75 MG capsule, 1 capsule Every 12 (Twelve) Hours. (Patient not taking: Reported on 4/15/2025), Disp: , Rfl:     predniSONE (DELTASONE) 10 MG tablet, TAKE 6 TABLETS BY MOUTH EVERY DAY FOR 2 DAYS THEN 4 TABLETS EVERY DAY FOR 2 DAYS THEN 2 TABLETS EVERY DAY FOR 2 DAYS THEN 1/2 TABLET EVERY DAY FOR 2 DAYS WITH FOOD (Patient not taking: Reported on 4/15/2025), Disp: , Rfl:     pregabalin (LYRICA) 75 MG capsule, Take 1 capsule by mouth 2 (Two) Times a Day. (Patient not taking: Reported on 4/15/2025), Disp: , Rfl:     topiramate (TOPAMAX) 50 MG tablet, Take 1 tablet by mouth Daily., Disp: , Rfl:     traZODone (DESYREL) 50 MG tablet, Daily. (Patient not taking: Reported on 4/15/2025), Disp: , Rfl:     valACYclovir (VALTREX) 1000 MG tablet, Every 8 (Eight) Hours. (Patient not taking: Reported on 4/15/2025), Disp: , Rfl:     vitamin D (ERGOCALCIFEROL) 1.25 MG (12405 UT) capsule capsule, Take 1 capsule by mouth 1 (One) Time Per  Week. (Patient not taking: Reported on 4/15/2025), Disp: , Rfl:     Allergies   Allergen Reactions    Aspirin Anaphylaxis and Unknown - High Severity     Other reaction(s): anaphylaxis    Sulfamethoxazole-Trimethoprim Anaphylaxis, Hives, Unknown - High Severity and Rash     Other reaction(s): hives    Gabapentin Other (See Comments)     Behavioral changes, aggressive behavior UNABLE TO EAT WHILE TAKING THE MEDICINE    Other reaction(s): Other (See Comments)   Behavioral changes, aggressive behavior UNABLE TO EAT WHILE TAKING THE MEDICINE       Social History     Tobacco Use    Smoking status: Every Day     Current packs/day: 1.50     Average packs/day: 1.5 packs/day for 25.0 years (37.5 ttl pk-yrs)     Types: Cigarettes    Smokeless tobacco: Never   Vaping Use    Vaping status: Never Used   Substance Use Topics    Alcohol use: No    Drug use: No       Alexandra Latonya Cantrell  reports that she has been smoking cigarettes. She has a 37.5 pack-year smoking history. She has never used smokeless tobacco..       Family History   Problem Relation Age of Onset    Heart attack Mother     Diabetes Mother     Heart disease Father     Stroke Father     Lung cancer Father     Heart attack Sister     Heart attack Sister     Heart attack Sister     Ovarian cancer Sister     Heart attack Brother     Heart attack Brother     Heart attack Brother     Diabetes Maternal Grandmother     Diabetes Paternal Grandmother     Breast cancer Neg Hx        Review of Systems   Constitutional: Negative.    HENT: Negative.     Eyes: Negative.    Respiratory: Negative.     Cardiovascular: Negative.    Gastrointestinal: Negative.    Endocrine: Negative.    Musculoskeletal: Negative.    Skin: Negative.    Allergic/Immunologic: Negative.    Neurological: Negative.    Hematological: Negative.    Psychiatric/Behavioral: Negative.         Patient reports that she is not currently experiencing any symptoms of urinary incontinence.      noTESTED FOR  "CHLAMYDIA?    Gardisil indicated? (9-46yo) 0,2,6 months: no    EXAM:  /88   Ht 167.6 cm (65.98\")   Wt 94.3 kg (208 lb)   LMP  (LMP Unknown)   BMI 33.59 kg/m²     Physical Exam  Vitals and nursing note reviewed. Exam conducted with a chaperone present.   Constitutional:       General: She is not in acute distress.     Appearance: She is well-developed. She is not diaphoretic.   HENT:      Head: Normocephalic and atraumatic.      Nose: Nose normal.   Eyes:      Extraocular Movements: Extraocular movements intact.   Cardiovascular:      Rate and Rhythm: Normal rate.   Pulmonary:      Effort: Pulmonary effort is normal.   Chest:   Breasts:     Breasts are symmetrical.      Right: Normal. No mass, nipple discharge, skin change or tenderness.      Left: Normal. No mass, nipple discharge, skin change or tenderness.   Abdominal:      General: There is no distension.      Palpations: Abdomen is soft. There is no mass.      Tenderness: There is no abdominal tenderness. There is no guarding.   Genitourinary:     General: Normal vulva.      Pubic Area: No rash.       Vagina: Normal. No vaginal discharge.      Cervix: Normal.      Uterus: Normal.       Adnexa: Right adnexa normal and left adnexa normal.            Comments: Cuff wnl, pap done  Musculoskeletal:         General: No tenderness or deformity. Normal range of motion.      Cervical back: Normal range of motion.   Lymphadenopathy:      Upper Body:      Right upper body: No axillary adenopathy.      Left upper body: No axillary adenopathy.   Skin:     General: Skin is warm and dry.      Coloration: Skin is not pale.      Findings: No erythema or rash.   Neurological:      Mental Status: She is alert and oriented to person, place, and time.   Psychiatric:         Behavior: Behavior normal.         Thought Content: Thought content normal.         Judgment: Judgment normal.         Labs:   Lab Results (last 24 hours)       Procedure Component Value Units Date/Time "    POC Urinalysis Dipstick [[id number]]  (Normal) Collected: 04/15/25 1041    Specimen: Urine Updated: 04/15/25 1042     Color Yellow     Clarity, UA Clear     Glucose, UA Negative mg/dL      Bilirubin Negative     Ketones, UA Negative     Specific Gravity  1.005     Blood, UA Negative     pH, Urine 5.0     Protein, POC Negative mg/dL      Urobilinogen, UA Normal     Leukocytes Negative     Nitrite, UA Negative                As part of wellness and prevention, the following topics were discussed with the patient where appropriate: healthy weight, substance abuse/misuse, mental health, encouraging self breast exam, and other counseling and guidance done:  Nutrition, physical activity, healthy weight, injury prevention, misuse of tobacco, alcohol and drugs, sexual behavior and STDs, contraception, dental health, mental health, immunizations breast cancer screening and exams.      Mammogram:   Last Completed Mammogram            Awaiting Completion       MAMMOGRAM (Every 2 Years) Order placed this encounter      04/15/2025  Order placed for Mammo Screening Digital Tomosynthesis Bilateral With CAD by Darling, Joe Martinez MD    06/03/2024  Mammo Screening Digital Tomosynthesis Bilateral With CAD    05/31/2023  Mammo Screening Digital Tomosynthesis Bilateral With CAD    04/26/2022  Mammo Screening Digital Tomosynthesis Bilateral With CAD    08/07/2019  Mammo Screening Digital Tomosynthesis Bilateral With CAD     Only the first 5 history entries have been loaded, but more history exists.                        MAMMOGRAM UP TO DATE IF AGE APPROPRIATE?  No  (if no, pt encouraged to schedule; risks of undiagnosed breast cancer reviewed with pt if she does not have a mammogram)    Colonoscopy:   Last Completed Colonoscopy    This patient has no relevant Health Maintenance data.       COLONOSCOPY UP TO DATE IF AGE APPROPRIATE? No  (if no, risks of undiagnosed colon cancer reviewed with pt if she fails to have the  colonoscopy)      Assessment/Plan:     1) GYN annual well woman exam.   2) PAP done today?   3) problems addressed:     * No active hospital problems. *            Follow up prn or one year.    Pt instructed to call for results of any testing done today and that failure to call if she has not heard from us could result in inadequate treatment.  Pt verbalized her understanding.   Diagnoses and all orders for this visit:    1. Routine gynecological examination (Primary)  -     POC Urinalysis Dipstick    2. H/O abdominal hysterectomy-for chronic DUB    3. Status post bilateral salpingo-oophorectomy    4. Smoker    5. Screening for colon cancer  -     Ambulatory Referral For Screening Colonoscopy    6. Screening mammogram for breast cancer  -     Mammo Screening Digital Tomosynthesis Bilateral With CAD; Future    7. Lower abdominal pain  -     Ambulatory Referral to General Surgery    8. Intra-abdominal adhesions  -     Ambulatory Referral to General Surgery           RTO Return in about 1 year (around 4/15/2026) for Annual physical.      Joe Hastings MD  04/15/25  11:25 EDT

## 2025-04-21 LAB
CYTOLOGIST CVX/VAG CYTO: NORMAL
CYTOLOGY CVX/VAG DOC CYTO: NORMAL
CYTOLOGY CVX/VAG DOC THIN PREP: NORMAL
DX ICD CODE: NORMAL
HPV I/H RISK 4 DNA CVX QL PROBE+SIG AMP: NEGATIVE
Lab: NORMAL
OTHER STN SPEC: NORMAL
SERVICE CMNT-IMP: NORMAL
STAT OF ADQ CVX/VAG CYTO-IMP: NORMAL

## 2025-04-25 ENCOUNTER — OFFICE VISIT (OUTPATIENT)
Dept: SURGERY | Facility: CLINIC | Age: 45
End: 2025-04-25
Payer: MEDICAID

## 2025-04-25 VITALS
HEART RATE: 76 BPM | WEIGHT: 206.9 LBS | SYSTOLIC BLOOD PRESSURE: 145 MMHG | DIASTOLIC BLOOD PRESSURE: 85 MMHG | HEIGHT: 65 IN | OXYGEN SATURATION: 96 % | BODY MASS INDEX: 34.47 KG/M2

## 2025-04-25 DIAGNOSIS — Z12.11 SCREEN FOR COLON CANCER: ICD-10-CM

## 2025-04-25 DIAGNOSIS — F17.210 SMOKING GREATER THAN 30 PACK YEARS: ICD-10-CM

## 2025-04-25 DIAGNOSIS — R11.0 NAUSEA: ICD-10-CM

## 2025-04-25 DIAGNOSIS — R10.84 GENERALIZED ABDOMINAL PAIN: Primary | ICD-10-CM

## 2025-04-25 NOTE — PROGRESS NOTES
ASSESSMENT/PLAN:    45-year-old lady presenting with chronic abdominal pain.  She has undergone multiple operations previously to include 2 diagnostic laparoscopies with extensive lysis of adhesions, lastly in 2022 by Dr. Joe Hastings.  Dr. Lawler and I had a lengthy discussion with her about the concerns of undergoing a procedure for adhesions and our recommendation was to initially start with an EGD due to her smoking history and a colonoscopy due to her age for screening purposes.  We will also repeat the CT scan with oral contrast to further evaluate.  Once the results of these have been reviewed, we will discuss recommendations to include surgical intervention at that time.  All questions were answered and she was willing to proceed with all recommendations.    CC:     Abdominal pain    HPI:    This is a 45-year-old lady presenting to the office today at the request of Dr. Joe Hastings for consultation.  She has a history significant for multiple abdominal operations to include an open appendectomy at the age of 8 via a midline incision.  She has since undergone a hysterectomy and 3 C-sections through Pfannenstiel incision, a laparoscopic cholecystectomy and 2 diagnostic laparoscopies with extensive lysis of adhesions most recently in 2022.  She presents today with a continuation of abdominal pain which comes and goes and varies in severity and duration.  The pain will initially start in her lower abdomen and radiate up to her upper abdomen and can last for several minutes.  She does feel nauseous all the time but does not vomit.  She does have difficulty with bowel movements, having frequent more looser stools but not necessarily associated with her episodes of abdominal pain.  She denies having dark tarry stools or bright red blood with her bowel movements.    ENDOSCOPY:   Colonoscopy: None    RADIOLOGY:   CT abdomen pelvis 5-24: No acute abnormality of the abdomen pelvis, probable hepatic  steatosis    SOCIAL HISTORY:   Everyday tobacco use tobacco use  Denies alcohol use    FAMILY HISTORY:    Colorectal cancer: None    PREVIOUS ABDOMINAL SURGERY    Open appendectomy midline incision  Open hysterectomy Pfannenstiel incision   x 3  Laparoscopic cholecystectomy  Diagnostic laparoscopy with lysis of adhesions   Diagnostic laparoscopy with bilateral salpingo-oophorectomy and extensive lysis of adhesions     OTHER SURGERY  Past Surgical History:   Procedure Laterality Date    APPENDECTOMY       SECTION      X3    CHOLECYSTECTOMY      LAP    DIAGNOSTIC LAPAROSCOPY N/A 10/25/2018    Procedure: DIAGNOSTIC LAPAROSCOPY with lysis of adhesions;  Surgeon: Joe Hastings MD;  Location: [facility];  Service: Obstetrics/Gynecology    DIAGNOSTIC LAPAROSCOPY N/A 2022    Procedure: DIAGNOSTIC LAPAROTOMY, LAPAROSCOPIC BILATERAL SALPINGOOPHORECTOMY, EXTENSIVE LYSIS OF ADHESIONS;  Surgeon: Joe Hastings MD;  Location: [facility] OR;  Service: Obstetrics/Gynecology;  Laterality: N/A;    HYSTERECTOMY      OPEN    TUBAL ABDOMINAL LIGATION         PAST MEDICAL HISTORY:    Past Medical History:   Diagnosis Date    Abdominal pain     SCHEDULED FOR SX    Abscess     LEFT UPPER JAW, TOOTH BROKE OFF    Anemia     Arthritis     FINGERS & TOES    Asthma     COPD (chronic obstructive pulmonary disease)     Degenerative disc disease, cervical     Migraines     Screen for colon cancer 2025       MEDICATIONS:     Current Outpatient Medications:     albuterol (PROAIR RESPICLICK) 108 (90 Base) MCG/ACT inhaler, Inhale 2 puffs Every 4 (Four) Hours As Needed for Wheezing (asthma)., Disp: 1 inhaler, Rfl: 11    albuterol sulfate HFA (ProAir HFA) 108 (90 Base) MCG/ACT inhaler, INHALE 1 PUFF BY MOUTH EVERY 4 HOURS AS NEEDED, Disp: , Rfl:     albuterol sulfate HFA (Ventolin HFA) 108 (90 Base) MCG/ACT inhaler, Every 4 (Four) Hours., Disp: , Rfl:     albuterol sulfate HFA (Ventolin HFA) 108  "(90 Base) MCG/ACT inhaler, Every 4 (Four) Hours., Disp: , Rfl:     atorvastatin (LIPITOR) 10 MG tablet, Take 1 tablet by mouth Daily., Disp: , Rfl:     betamethasone dipropionate (DIPROSONE) 0.05 % cream, 1 Application Daily., Disp: , Rfl:     budesonide-formoterol (Symbicort) 160-4.5 MCG/ACT inhaler, Every 12 (Twelve) Hours., Disp: , Rfl:     Cetirizine HCl (zyrTEC) 5 MG/5ML solution solution, Daily., Disp: , Rfl:     estradiol (ESTRACE) 2 MG tablet, TAKE 1 TABLET BY MOUTH EVERY DAY for 90, Disp: , Rfl:     ibuprofen (ADVIL,MOTRIN) 800 MG tablet, take 1 tablet by mouth three times daily with food or milk as needed, Disp: , Rfl:     levothyroxine (SYNTHROID, LEVOTHROID) 50 MCG tablet, Daily., Disp: , Rfl:     loratadine (CLARITIN) 10 MG tablet, Take 1 tablet by mouth Daily., Disp: , Rfl:     tiZANidine (ZANAFLEX) 4 MG tablet, TAKE ONE TABLET BY MOUTH TWICE DAILY AS NEEDED 30 DAYS., Disp: , Rfl:     tiZANidine (ZANAFLEX) 4 MG tablet, 1 tablet as needed TWICE DAILYASNEEDED for 30 days, Disp: , Rfl:     topiramate (TOPAMAX) 50 MG tablet, Take 1 tablet by mouth Daily., Disp: , Rfl:     ALLERGIES:   Allergies   Allergen Reactions    Aspirin Anaphylaxis and Unknown - High Severity     Other reaction(s): anaphylaxis    Sulfamethoxazole-Trimethoprim Anaphylaxis, Hives, Unknown - High Severity and Rash     Other reaction(s): hives    Gabapentin Other (See Comments)     Behavioral changes, aggressive behavior UNABLE TO EAT WHILE TAKING THE MEDICINE    Other reaction(s): Other (See Comments)   Behavioral changes, aggressive behavior UNABLE TO EAT WHILE TAKING THE MEDICINE       PHYSICAL EXAM:   Constitutional: Well-developed well-nourished, no acute distress  Vital signs:   Height 65\"  Weight 206  BMI 34.4  Neck: Supple, no palpable mass, trachea midline  Psychiatric: Alert and oriented ×3, normal affect   BMI is >= 30 and <35. (Class 1 Obesity). The following options were offered after discussion;: weight loss educational " material (shared in after visit summary)      Jesse Gardner PA-C    [facility] - General Surgery  [address], [address]  [place] [address]    [address], [address]  [place] [address]    Office: [phone]  Fax: [phone]

## 2025-04-25 NOTE — H&P (VIEW-ONLY)
ASSESSMENT/PLAN:    45-year-old lady presenting with chronic abdominal pain.  She has undergone multiple operations previously to include 2 diagnostic laparoscopies with extensive lysis of adhesions, lastly in 2022 by Dr. Joe Hastings.  Dr. Lawler and I had a lengthy discussion with her about the concerns of undergoing a procedure for adhesions and our recommendation was to initially start with an EGD due to her smoking history and a colonoscopy due to her age for screening purposes.  We will also repeat the CT scan with oral contrast to further evaluate.  Once the results of these have been reviewed, we will discuss recommendations to include surgical intervention at that time.  All questions were answered and she was willing to proceed with all recommendations.    CC:     Abdominal pain    HPI:    This is a 45-year-old lady presenting to the office today at the request of Dr. Joe Hastings for consultation.  She has a history significant for multiple abdominal operations to include an open appendectomy at the age of 8 via a midline incision.  She has since undergone a hysterectomy and 3 C-sections through Pfannenstiel incision, a laparoscopic cholecystectomy and 2 diagnostic laparoscopies with extensive lysis of adhesions most recently in 2022.  She presents today with a continuation of abdominal pain which comes and goes and varies in severity and duration.  The pain will initially start in her lower abdomen and radiate up to her upper abdomen and can last for several minutes.  She does feel nauseous all the time but does not vomit.  She does have difficulty with bowel movements, having frequent more looser stools but not necessarily associated with her episodes of abdominal pain.  She denies having dark tarry stools or bright red blood with her bowel movements.    ENDOSCOPY:   Colonoscopy: None    RADIOLOGY:   CT abdomen pelvis 5-24: No acute abnormality of the abdomen pelvis, probable hepatic  steatosis    SOCIAL HISTORY:   Everyday tobacco use tobacco use  Denies alcohol use    FAMILY HISTORY:    Colorectal cancer: None    PREVIOUS ABDOMINAL SURGERY    Open appendectomy midline incision  Open hysterectomy Pfannenstiel incision   x 3  Laparoscopic cholecystectomy  Diagnostic laparoscopy with lysis of adhesions   Diagnostic laparoscopy with bilateral salpingo-oophorectomy and extensive lysis of adhesions     OTHER SURGERY  Past Surgical History:   Procedure Laterality Date    APPENDECTOMY       SECTION      X3    CHOLECYSTECTOMY      LAP    DIAGNOSTIC LAPAROSCOPY N/A 10/25/2018    Procedure: DIAGNOSTIC LAPAROSCOPY with lysis of adhesions;  Surgeon: Joe Hastings MD;  Location: [facility];  Service: Obstetrics/Gynecology    DIAGNOSTIC LAPAROSCOPY N/A 2022    Procedure: DIAGNOSTIC LAPAROTOMY, LAPAROSCOPIC BILATERAL SALPINGOOPHORECTOMY, EXTENSIVE LYSIS OF ADHESIONS;  Surgeon: Joe Hastings MD;  Location: [facility] OR;  Service: Obstetrics/Gynecology;  Laterality: N/A;    HYSTERECTOMY      OPEN    TUBAL ABDOMINAL LIGATION         PAST MEDICAL HISTORY:    Past Medical History:   Diagnosis Date    Abdominal pain     SCHEDULED FOR SX    Abscess     LEFT UPPER JAW, TOOTH BROKE OFF    Anemia     Arthritis     FINGERS & TOES    Asthma     COPD (chronic obstructive pulmonary disease)     Degenerative disc disease, cervical     Migraines     Screen for colon cancer 2025       MEDICATIONS:     Current Outpatient Medications:     albuterol (PROAIR RESPICLICK) 108 (90 Base) MCG/ACT inhaler, Inhale 2 puffs Every 4 (Four) Hours As Needed for Wheezing (asthma)., Disp: 1 inhaler, Rfl: 11    albuterol sulfate HFA (ProAir HFA) 108 (90 Base) MCG/ACT inhaler, INHALE 1 PUFF BY MOUTH EVERY 4 HOURS AS NEEDED, Disp: , Rfl:     albuterol sulfate HFA (Ventolin HFA) 108 (90 Base) MCG/ACT inhaler, Every 4 (Four) Hours., Disp: , Rfl:     albuterol sulfate HFA (Ventolin HFA) 108  "(90 Base) MCG/ACT inhaler, Every 4 (Four) Hours., Disp: , Rfl:     atorvastatin (LIPITOR) 10 MG tablet, Take 1 tablet by mouth Daily., Disp: , Rfl:     betamethasone dipropionate (DIPROSONE) 0.05 % cream, 1 Application Daily., Disp: , Rfl:     budesonide-formoterol (Symbicort) 160-4.5 MCG/ACT inhaler, Every 12 (Twelve) Hours., Disp: , Rfl:     Cetirizine HCl (zyrTEC) 5 MG/5ML solution solution, Daily., Disp: , Rfl:     estradiol (ESTRACE) 2 MG tablet, TAKE 1 TABLET BY MOUTH EVERY DAY for 90, Disp: , Rfl:     ibuprofen (ADVIL,MOTRIN) 800 MG tablet, take 1 tablet by mouth three times daily with food or milk as needed, Disp: , Rfl:     levothyroxine (SYNTHROID, LEVOTHROID) 50 MCG tablet, Daily., Disp: , Rfl:     loratadine (CLARITIN) 10 MG tablet, Take 1 tablet by mouth Daily., Disp: , Rfl:     tiZANidine (ZANAFLEX) 4 MG tablet, TAKE ONE TABLET BY MOUTH TWICE DAILY AS NEEDED 30 DAYS., Disp: , Rfl:     tiZANidine (ZANAFLEX) 4 MG tablet, 1 tablet as needed TWICE DAILYASNEEDED for 30 days, Disp: , Rfl:     topiramate (TOPAMAX) 50 MG tablet, Take 1 tablet by mouth Daily., Disp: , Rfl:     ALLERGIES:   Allergies   Allergen Reactions    Aspirin Anaphylaxis and Unknown - High Severity     Other reaction(s): anaphylaxis    Sulfamethoxazole-Trimethoprim Anaphylaxis, Hives, Unknown - High Severity and Rash     Other reaction(s): hives    Gabapentin Other (See Comments)     Behavioral changes, aggressive behavior UNABLE TO EAT WHILE TAKING THE MEDICINE    Other reaction(s): Other (See Comments)   Behavioral changes, aggressive behavior UNABLE TO EAT WHILE TAKING THE MEDICINE       PHYSICAL EXAM:   Constitutional: Well-developed well-nourished, no acute distress  Vital signs:   Height 65\"  Weight 206  BMI 34.4  Neck: Supple, no palpable mass, trachea midline  Psychiatric: Alert and oriented ×3, normal affect   BMI is >= 30 and <35. (Class 1 Obesity). The following options were offered after discussion;: weight loss educational " material (shared in after visit summary)      Jesse Gardner PA-C    [facility] - General Surgery  [address], [address]  [place] [address]    [address], [address]  [place] [address]    Office: [phone]  Fax: [phone]

## 2025-04-27 NOTE — PROGRESS NOTES
I have reviewed the notes, assessments, and/or procedures performed by Jesse Gardner, I concur with her/his documentation of Alexandra Cantrell.

## 2025-05-01 ENCOUNTER — HOSPITAL ENCOUNTER (OUTPATIENT)
Dept: CT IMAGING | Facility: HOSPITAL | Age: 45
Discharge: HOME | End: 2025-05-01
Admitting: PHYSICIAN ASSISTANT
Payer: MEDICAID

## 2025-05-01 PROCEDURE — 25510000002 DIATRIZOATE MEGLUMINE & SODIUM PER 1 ML: Performed by: PHYSICIAN ASSISTANT

## 2025-05-01 PROCEDURE — 74176 CT ABD & PELVIS W/O CONTRAST: CPT

## 2025-05-01 RX ORDER — IOPAMIDOL 755 MG/ML
100 INJECTION, SOLUTION INTRAVASCULAR
Status: DISCONTINUED | OUTPATIENT
Start: 2025-05-01 | End: 2025-05-02 | Stop reason: HOSPADM

## 2025-05-01 RX ORDER — DIATRIZOATE MEGLUMINE AND DIATRIZOATE SODIUM 660; 100 MG/ML; MG/ML
30 SOLUTION ORAL; RECTAL ONCE
Status: COMPLETED | OUTPATIENT
Start: 2025-05-01 | End: 2025-05-01

## 2025-05-01 RX ADMIN — DIATRIZOATE MEGLUMINE AND DIATRIZOATE SODIUM 30 ML: 660; 100 LIQUID ORAL; RECTAL at 13:27

## 2025-05-08 ENCOUNTER — ANESTHESIA EVENT (OUTPATIENT)
Dept: PERIOP | Facility: HOSPITAL | Age: 45
End: 2025-05-08
Payer: MEDICAID

## 2025-05-08 RX ORDER — SODIUM CHLORIDE 0.9 % (FLUSH) 0.9 %
10 SYRINGE (ML) INJECTION AS NEEDED
Status: CANCELLED | OUTPATIENT
Start: 2025-05-08

## 2025-05-08 RX ORDER — LIDOCAINE HYDROCHLORIDE 10 MG/ML
0.5 INJECTION, SOLUTION EPIDURAL; INFILTRATION; INTRACAUDAL; PERINEURAL ONCE AS NEEDED
Status: CANCELLED | OUTPATIENT
Start: 2025-05-08

## 2025-05-08 RX ORDER — SODIUM CHLORIDE, SODIUM LACTATE, POTASSIUM CHLORIDE, CALCIUM CHLORIDE 600; 310; 30; 20 MG/100ML; MG/100ML; MG/100ML; MG/100ML
9 INJECTION, SOLUTION INTRAVENOUS CONTINUOUS
Status: CANCELLED | OUTPATIENT
Start: 2025-05-08 | End: 2025-05-09

## 2025-05-08 RX ORDER — SODIUM CHLORIDE 0.9 % (FLUSH) 0.9 %
10 SYRINGE (ML) INJECTION EVERY 12 HOURS SCHEDULED
Status: CANCELLED | OUTPATIENT
Start: 2025-05-08

## 2025-05-08 RX ORDER — SODIUM CHLORIDE 9 MG/ML
40 INJECTION, SOLUTION INTRAVENOUS AS NEEDED
Status: CANCELLED | OUTPATIENT
Start: 2025-05-08 | End: 2025-05-09

## 2025-05-09 ENCOUNTER — ANESTHESIA (OUTPATIENT)
Dept: PERIOP | Facility: HOSPITAL | Age: 45
End: 2025-05-09
Payer: MEDICAID

## 2025-05-09 ENCOUNTER — HOSPITAL ENCOUNTER (OUTPATIENT)
Facility: HOSPITAL | Age: 45
Setting detail: HOSPITAL OUTPATIENT SURGERY
Discharge: HOME | End: 2025-05-09
Attending: SURGERY | Admitting: SURGERY
Payer: MEDICAID

## 2025-05-09 VITALS
DIASTOLIC BLOOD PRESSURE: 59 MMHG | HEART RATE: 78 BPM | RESPIRATION RATE: 12 BRPM | TEMPERATURE: 97.8 F | BODY MASS INDEX: 33.95 KG/M2 | OXYGEN SATURATION: 94 % | SYSTOLIC BLOOD PRESSURE: 111 MMHG | WEIGHT: 204 LBS

## 2025-05-09 DIAGNOSIS — Z12.11 SCREEN FOR COLON CANCER: ICD-10-CM

## 2025-05-09 DIAGNOSIS — R10.84 GENERALIZED ABDOMINAL PAIN: ICD-10-CM

## 2025-05-09 DIAGNOSIS — R11.0 NAUSEA: ICD-10-CM

## 2025-05-09 DIAGNOSIS — F17.210 SMOKING GREATER THAN 30 PACK YEARS: ICD-10-CM

## 2025-05-09 PROCEDURE — 43239 EGD BIOPSY SINGLE/MULTIPLE: CPT | Performed by: SURGERY

## 2025-05-09 PROCEDURE — 25010000002 PROPOFOL 200 MG/20ML EMULSION: Performed by: NURSE ANESTHETIST, CERTIFIED REGISTERED

## 2025-05-09 PROCEDURE — 25810000003 LACTATED RINGERS PER 1000 ML: Performed by: NURSE ANESTHETIST, CERTIFIED REGISTERED

## 2025-05-09 PROCEDURE — 88305 TISSUE EXAM BY PATHOLOGIST: CPT | Performed by: SURGERY

## 2025-05-09 PROCEDURE — 25010000002 LIDOCAINE 2% SOLUTION: Performed by: NURSE ANESTHETIST, CERTIFIED REGISTERED

## 2025-05-09 PROCEDURE — 45378 DIAGNOSTIC COLONOSCOPY: CPT | Performed by: SURGERY

## 2025-05-09 RX ORDER — LIDOCAINE HYDROCHLORIDE 20 MG/ML
INJECTION, SOLUTION INFILTRATION; PERINEURAL AS NEEDED
Status: DISCONTINUED | OUTPATIENT
Start: 2025-05-09 | End: 2025-05-09 | Stop reason: SURG

## 2025-05-09 RX ORDER — SODIUM CHLORIDE, SODIUM LACTATE, POTASSIUM CHLORIDE, CALCIUM CHLORIDE 600; 310; 30; 20 MG/100ML; MG/100ML; MG/100ML; MG/100ML
INJECTION, SOLUTION INTRAVENOUS CONTINUOUS PRN
Status: DISCONTINUED | OUTPATIENT
Start: 2025-05-09 | End: 2025-05-09 | Stop reason: SURG

## 2025-05-09 RX ORDER — SODIUM CHLORIDE, SODIUM LACTATE, POTASSIUM CHLORIDE, CALCIUM CHLORIDE 600; 310; 30; 20 MG/100ML; MG/100ML; MG/100ML; MG/100ML
100 INJECTION, SOLUTION INTRAVENOUS ONCE
Status: DISCONTINUED | OUTPATIENT
Start: 2025-05-09 | End: 2025-05-09 | Stop reason: HOSPADM

## 2025-05-09 RX ORDER — PROPOFOL 10 MG/ML
INJECTION, EMULSION INTRAVENOUS AS NEEDED
Status: DISCONTINUED | OUTPATIENT
Start: 2025-05-09 | End: 2025-05-09 | Stop reason: SURG

## 2025-05-09 RX ORDER — ONDANSETRON 2 MG/ML
4 INJECTION INTRAMUSCULAR; INTRAVENOUS ONCE AS NEEDED
Status: DISCONTINUED | OUTPATIENT
Start: 2025-05-09 | End: 2025-05-09 | Stop reason: HOSPADM

## 2025-05-09 RX ADMIN — PROPOFOL 50 MG: 10 INJECTION, EMULSION INTRAVENOUS at 09:31

## 2025-05-09 RX ADMIN — SODIUM CHLORIDE, POTASSIUM CHLORIDE, SODIUM LACTATE AND CALCIUM CHLORIDE: 600; 310; 30; 20 INJECTION, SOLUTION INTRAVENOUS at 09:26

## 2025-05-09 RX ADMIN — PROPOFOL 100 MG: 10 INJECTION, EMULSION INTRAVENOUS at 09:26

## 2025-05-09 RX ADMIN — PROPOFOL 50 MG: 10 INJECTION, EMULSION INTRAVENOUS at 09:35

## 2025-05-09 RX ADMIN — PROPOFOL 50 MG: 10 INJECTION, EMULSION INTRAVENOUS at 09:29

## 2025-05-09 RX ADMIN — LIDOCAINE HYDROCHLORIDE 80 MG: 20 INJECTION, SOLUTION INFILTRATION; PERINEURAL at 09:26

## 2025-05-09 NOTE — OP NOTE
PREOPERATIVE DIAGNOSIS:  Generalized abdominal pain  Nausea  Screening    POSTOPERATIVE DIAGNOSIS AND FINDINGS:  Normal upper endoscopy  Normal colon  Normal terminal ileum    PROCEDURE:  EGD with biopsy of gastric antrum and duodenum  Colonoscopy to ileum    SURGEON:  Miguel Lawler MD    ANESTHESIA:  MAC    SPECIMEN(S):  Antral biopsies  Duodenal biopsies    DESCRIPTION:  In decubitus position endoscope was inserted into the esophagus, stomach and duodenum. Antegrade and retroflex view of stomach performed.  1st, 2nd and 3rd portions of duodenum were normal.  Duodenal biopsies obtained.  Gastric antrum, body, and retroflexed view of fundus were normal.  Antral biopsies obtained.  GE junction and esophagus were normal.    Digital rectal exam was normal. Colonoscope inserted under direct visualization of lumen to cecum confirmed by visualization of ileocecal valve and appendiceal orifice.  Ileocecal valve intubated.  Terminal ileum normal.  Scope slowly withdrawn circumferentially examining all mucosal surfaces.  Bowel preparation was good.  There were no mucosal abnormalities.  No diverticulosis.  Tolerated well.    RECOMMENDATION FOR FUTURE SURVEILLANCE:  10 years    Miguel Lawler M.D.

## 2025-05-09 NOTE — ANESTHESIA POSTPROCEDURE EVALUATION
Patient: Alexandra Cantrell    Procedure Summary       Date: 05/09/25 Room / Location: [facility] ENDOSCOPY 2 /  [facility]    Anesthesia Start: 0921 Anesthesia Stop: 0951    Procedures:       COLONOSCOPY      ESOPHAGOGASTRODUODENOSCOPY (Esophagus) Diagnosis:       Generalized abdominal pain      Nausea      Screen for colon cancer      Smoking greater than 30 pack years      (Generalized abdominal pain [R10.84])      (Nausea [R11.0])      (Screen for colon cancer [Z12.11])      (Smoking greater than 30 pack years [F17.210])    Surgeons: Miguel Lawler MD Provider: Salma Armenta CRNA    Anesthesia Type: MAC ASA Status: 2            Anesthesia Type: MAC    Vitals  Vitals Value Taken Time   /59 05/09/25 10:13   Temp 97.8 °F (36.6 °C) 05/09/25 09:55   Pulse 71 05/09/25 10:15   Resp 12 05/09/25 10:10   SpO2 92 % 05/09/25 10:15   Vitals shown include unfiled device data.        Post Anesthesia Care and Evaluation    Patient location during evaluation: bedside  Patient participation: complete - patient participated  Level of consciousness: awake and alert  Pain score: 0  Pain management: adequate    Airway patency: patent  Anesthetic complications: No anesthetic complications  PONV Status: none  Cardiovascular status: acceptable  Respiratory status: acceptable  Hydration status: acceptable

## 2025-05-09 NOTE — ANESTHESIA PREPROCEDURE EVALUATION
Anesthesia Evaluation     Patient summary reviewed and Nursing notes reviewed   no history of anesthetic complications:   NPO Solid Status: > 8 hours  NPO Liquid Status: > 8 hours           Airway   Mallampati: II  TM distance: >3 FB  Neck ROM: full  No difficulty expected  Dental    (+) edentulous    Pulmonary - normal exam    breath sounds clear to auscultation  (+) a smoker Current, Smoked day of surgery, cigarettes, COPD moderate, asthma,  Cardiovascular - normal exam  Exercise tolerance: good (4-7 METS)    ECG reviewed  Rhythm: regular  Rate: normal    (+) hyperlipidemia  (-) hypertension      Neuro/Psych  (+) headaches  GI/Hepatic/Renal/Endo    (+) obesity, GERD well controlled, thyroid problem hypothyroidism    Musculoskeletal     Abdominal   (+) obese   Substance History   (+) alcohol use     OB/GYN negative ob/gyn ROS         Other   arthritis,                       Anesthesia Plan    ASA 2     MAC     intravenous induction     Anesthetic plan, risks, benefits, and alternatives have been provided, discussed and informed consent has been obtained with: patient.  Pre-procedure education provided  Use of blood products discussed with patient  Consented to blood products.        CODE STATUS:

## 2025-05-12 ENCOUNTER — RESULTS FOLLOW-UP (OUTPATIENT)
Dept: PERIOP | Facility: HOSPITAL | Age: 45
End: 2025-05-12
Payer: MEDICAID

## 2025-05-12 LAB
CYTO UR: NORMAL
LAB AP CASE REPORT: NORMAL
PATH REPORT.FINAL DX SPEC: NORMAL
PATH REPORT.GROSS SPEC: NORMAL

## 2025-05-12 NOTE — PROGRESS NOTES
Please let her know that her endoscopic evaluation was normal with the exception of mild inflammation of the stomach.  She needs repeat colonoscopy in 10 years-put in computer for reminder.  With her scope results and previous normal CT scan, the only thing I can recommend trying at this point is over-the-counter Prilosec 20 mg p.o. daily for 4 weeks.  Have her let me know after that time whether it helps with her symptoms.  There is no indication for surgical intervention here.

## 2025-05-12 NOTE — TELEPHONE ENCOUNTER
ENDOSCOPY FOLLOW UP NOTE    Colonoscopy and EGD 5/9/2025    Indication:  Generalized abdominal pain  Nausea  Screening    Findings:  Normal upper endoscopy: Biopsies of the stomach showed mild chronic inflammation.  Biopsies of duodenum were normal.  Normal colon  Normal terminal ileum    Recommendations:  With the results of her endoscopic evaluation and CT scan, the only thing I can recommend trying at this point is a course of Prilosec 20 mg p.o. daily to see if it helps with her symptomatology.  There is no indication for surgical intervention.  Colonoscopy should be repeated in 10 years by current screening guidelines.    Miguel Lawler M.D.

## 2025-05-13 NOTE — TELEPHONE ENCOUNTER
I called and informed pt of cscope & EGD results.  I also informed her of Dr Lawler's recommendation for OTC Prilosec.  Pt verb understanding to all and knows to call around 4 wks after taking the Prilosec and report how she is feeling.    10-yr cscope recall placed & updated the HM tab.

## 2025-06-16 ENCOUNTER — HOSPITAL ENCOUNTER (OUTPATIENT)
Dept: MAMMOGRAPHY | Facility: HOSPITAL | Age: 45
Discharge: HOME | End: 2025-06-16
Admitting: OBSTETRICS & GYNECOLOGY
Payer: MEDICAID

## 2025-06-16 DIAGNOSIS — Z12.31 SCREENING MAMMOGRAM FOR BREAST CANCER: ICD-10-CM

## 2025-06-16 PROCEDURE — 77063 BREAST TOMOSYNTHESIS BI: CPT

## 2025-06-16 PROCEDURE — 77067 SCR MAMMO BI INCL CAD: CPT

## 2025-06-16 PROCEDURE — 77067 SCR MAMMO BI INCL CAD: CPT | Performed by: RADIOLOGY

## 2025-06-16 PROCEDURE — 77063 BREAST TOMOSYNTHESIS BI: CPT | Performed by: RADIOLOGY

## (undated) DEVICE — GOWN,PREVENTION PLUS,XXLARGE,STERILE: Brand: MEDLINE

## (undated) DEVICE — ENSEAL TRIO TEMPERATURE CONTOLLED TISSUE SEALING TECHNOLOGY DISPOSABLE TISSUE SEALING DEVICE TAPTRONIC TRIGGER ACTIVATED POWER 3MM CURVED JAW: Brand: ENSEAL

## (undated) DEVICE — SPNG GZ WOVN 4X4IN 12PLY 10/BX STRL

## (undated) DEVICE — ENDOPATH 5MM ENDOSCOPIC BLUNT TIP DISSECTORS (12 POUCHES CONTAINING 3 DISSECTORS EACH): Brand: ENDOPATH

## (undated) DEVICE — ENDOCUT SCISSOR TIP, DISPOSABLE: Brand: RENEW

## (undated) DEVICE — LAG GYN LAPAROSCOPY: Brand: MEDLINE INDUSTRIES, INC.

## (undated) DEVICE — SUT MNCRYL 4/0 SH 27IN Y415H

## (undated) DEVICE — GLV SURG SENSICARE W/ALOE PF LF 7.5 STRL

## (undated) DEVICE — TBG INSUFL W FLTR STRL

## (undated) DEVICE — APPL CHLORAPREP W/TINT 26ML ORNG

## (undated) DEVICE — LINER SURG CANSTR SXN S/RIGD 1500CC

## (undated) DEVICE — SAFELINER SUCTION CANISTER 1000CC: Brand: DEROYAL

## (undated) DEVICE — TOTAL TRAY, DB, 100% SILI FOLEY, 16FR 10: Brand: MEDLINE

## (undated) DEVICE — ENDOPATH XCEL BLADELESS TROCARS WITH STABILITY SLEEVES: Brand: ENDOPATH XCEL

## (undated) DEVICE — LAPAROSCOPIC SMOKE FILTRATION SYSTEM: Brand: PALL LAPAROSHIELD® PLUS LAPAROSCOPIC SMOKE FILTRATION SYSTEM

## (undated) DEVICE — SUT MNCRYL 4/0 PS2 18 IN

## (undated) DEVICE — BW-412T DISP COMBO CLEANING BRUSH: Brand: SINGLE USE COMBINATION CLEANING BRUSH

## (undated) DEVICE — 2, DISPOSABLE SUCTION/IRRIGATOR WITH DISPOSABLE TIP: Brand: STRYKEFLOW

## (undated) DEVICE — FRCP BX RADJAW4 NDL 2.8 240CM LG OG BX40

## (undated) DEVICE — ENDOPOUCH RETRIEVER SPECIMEN RETRIEVAL BAGS: Brand: ENDOPOUCH RETRIEVER

## (undated) DEVICE — SOL IRR H2O BO 1000ML STRL

## (undated) DEVICE — ENDOPATH XCEL UNIVERSAL TROCAR STABLILITY SLEEVES: Brand: ENDOPATH XCEL

## (undated) DEVICE — KT ORCA ORCAPOD DISP STRL

## (undated) DEVICE — Device

## (undated) DEVICE — APPL CHLORAPREP HI/LITE 26ML ORNG

## (undated) DEVICE — ADHS SKIN PREMIERPRO EXOFIN TOPICAL HI/VISC .5ML

## (undated) DEVICE — LAPAROSCOPIC SCOPE WARMER: Brand: DEROYAL

## (undated) DEVICE — PATIENT RETURN ELECTRODE, SINGLE-USE, CONTACT QUALITY MONITORING, ADULT, WITH 9FT CORD, FOR PATIENTS WEIGING OVER 33LBS. (15KG): Brand: MEGADYNE

## (undated) DEVICE — THE BITE BLOCK MAXI, LATEX FREE STRAP IS USED TO PROTECT THE ENDOSCOPE INSERTION TUBE FROM BEING BITTEN BY THE PATIENT.

## (undated) DEVICE — DRAPE,ROBOTICS,NO LEGGINGS,STERILE: Brand: MEDLINE

## (undated) DEVICE — ADAPT CLN BIOGUARD AIR/H2O DISP

## (undated) DEVICE — VIAL FORMALIN CAP 10P 40ML